# Patient Record
Sex: FEMALE | Race: WHITE | Employment: OTHER | URBAN - METROPOLITAN AREA
[De-identification: names, ages, dates, MRNs, and addresses within clinical notes are randomized per-mention and may not be internally consistent; named-entity substitution may affect disease eponyms.]

---

## 2017-02-07 ENCOUNTER — OFFICE VISIT (OUTPATIENT)
Dept: ORTHOPEDIC SURGERY | Age: 72
End: 2017-02-07

## 2017-02-07 VITALS
SYSTOLIC BLOOD PRESSURE: 132 MMHG | BODY MASS INDEX: 28.35 KG/M2 | DIASTOLIC BLOOD PRESSURE: 64 MMHG | WEIGHT: 160 LBS | HEIGHT: 63 IN | TEMPERATURE: 97.5 F | HEART RATE: 95 BPM

## 2017-02-07 DIAGNOSIS — M65.4 DE QUERVAIN'S TENOSYNOVITIS, LEFT: ICD-10-CM

## 2017-02-07 DIAGNOSIS — M19.012 PRIMARY OSTEOARTHRITIS OF BOTH SHOULDERS: Primary | ICD-10-CM

## 2017-02-07 DIAGNOSIS — M25.532 LEFT WRIST PAIN: ICD-10-CM

## 2017-02-07 DIAGNOSIS — M19.011 PRIMARY OSTEOARTHRITIS OF BOTH SHOULDERS: Primary | ICD-10-CM

## 2017-02-07 RX ORDER — DULOXETIN HYDROCHLORIDE 20 MG/1
90 CAPSULE, DELAYED RELEASE ORAL DAILY
COMMUNITY

## 2017-02-07 RX ORDER — TRIAMCINOLONE ACETONIDE 40 MG/ML
20 INJECTION, SUSPENSION INTRA-ARTICULAR; INTRAMUSCULAR ONCE
Qty: 1 ML | Refills: 0
Start: 2017-02-07 | End: 2017-02-07

## 2017-02-07 RX ORDER — TRIAMCINOLONE ACETONIDE 40 MG/ML
40 INJECTION, SUSPENSION INTRA-ARTICULAR; INTRAMUSCULAR ONCE
Qty: 1 ML | Refills: 0
Start: 2017-02-07 | End: 2017-02-07

## 2017-02-07 NOTE — PROGRESS NOTES
Christel Montalvo  1945   Chief Complaint   Patient presents with    Shoulder Pain     Bilateral, wants cortisone injections today    Wrist Pain     Left        HISTORY OF PRESENT ILLNESS  Christel Montalvo is a 66 y.o. female who presents today for reevaluation of bilateral shoulder and left wrist pain. She rates her pain 8/10 today. She states her wrist has been bothering her for several months. She states she has trouble with gripping and picking things up. The states the pain in both locations, affects her daily activities. Allergies   Allergen Reactions    Qvar [Beclomethasone Dipropionate] Shortness of Breath    Entex [Phenylephrine-Guaifenesin] Other (comments)     intolerance    Sulfa (Sulfonamide Antibiotics) Other (comments)        Past Medical History   Diagnosis Date    Allergic rhinitis      on allergy shots    Anemia     Asthma     Borderline diabetic     Cataract     Chronic lung disease     Cigarette smoker      abuse quit 2006    COPD [facility]ch[facility] pulmonary disease) ([facility])     Degenerative arthritis      both shoulders    Depression     Easy bruising     Hypertension     Nervousness     Osteoporosis 10/27/99      Social History     Social History    Marital status:      Spouse name: N/A    Number of children: N/A    Years of education: N/A     Occupational History    Not on file.      Social History Main Topics    Smoking status: Former Smoker     Packs/day: 2.50     Years: 45.00     Types: Cigarettes     Quit date: 1/1/2006    Smokeless tobacco: Never Used      Comment: smoked for 50 years    Alcohol use 1.2 - 1.8 oz/week     2 - 3 Standard drinks or equivalent per week      Comment: occ    Drug use: No    Sexual activity: Not Currently     Other Topics Concern    Not on file     Social History Narrative      Past Surgical History   Procedure Laterality Date    Hx heent  age 3     tonsillectomy    Hx orthopaedic  age 21     right leg tumor removed benign    Hx gyn  1974     hysterectomy    Hx gyn  1970     D & C      Family History   Problem Relation Age of Onset   [facility] Arthritis-rheumatoid Mother     Hypertension Mother     Headache Mother     Elevated Lipids Mother     Lung Disease Father     Cancer Maternal Aunt     Lung Disease Maternal Uncle       Current Outpatient Prescriptions   Medication Sig    DULoxetine (CYMBALTA) 20 mg capsule Take 20 mg by mouth daily.  triamcinolone acetonide (KENALOG) 40 mg/mL injection 1 mL by IntraMUSCular route once for 1 dose.  triamcinolone acetonide (KENALOG) 40 mg/mL injection 0.5 mL by IntraMUSCular route once for 1 dose.  SPIRIVA WITH HANDIHALER 18 mcg inhalation capsule     pravastatin (PRAVACHOL) 20 mg tablet     gabapentin (NEURONTIN) 100 mg capsule     PSEUDOEPHEDRINE-guaiFENesin (MUCINEX D)  mg per tablet Take 1 Tab by mouth every twelve (12) hours.  lisinopril (PRINIVIL, ZESTRIL) 10 mg tablet TAKE 1 TABLET BY MOUTH DAILY    ADVAIR DISKUS 250-50 mcg/dose diskus inhaler INHALE 1 PUFF BY MOUTH EVERY 12 HOURS** MUST MAKE APPOINTMENT FOR FURTHER REFILLS    albuterol (VENTOLIN HFA) 90 mcg/actuation inhaler Take 2 Puffs by inhalation every four (4) hours as needed for Wheezing.  diazepam (VALIUM) 5 mg tablet Take 1 Tab by mouth two (2) times daily as needed for Anxiety.  pseudoephedrine (SUDAFED) 30 mg tablet Take  by mouth every four (4) hours as needed.  aspirin (ASPIRIN) 325 mg tablet Take 325 mg by mouth daily.  cyclobenzaprine (FLEXERIL) 5 mg tablet Take 5 mg by mouth three (3) times daily as needed for Muscle Spasm(s).  HYDROcodone-acetaminophen (NORCO) 5-325 mg per tablet Take 2 Tabs by mouth every four (4) hours as needed for Pain. Max Daily Amount: 12 Tabs.  HYDROcodone-acetaminophen (NORCO) 5-325 mg per tablet Take 1 Tab by mouth every four (4) hours as needed for Pain. Max Daily Amount: 6 Tabs.     ondansetron hcl (ZOFRAN, AS HYDROCHLORIDE,) 4 mg tablet Take 1 Tab by mouth every eight (8) hours as needed for Nausea.  sertraline (ZOLOFT) 100 mg tablet TAKE 1 TABLET BY MOUTH EVERY DAY     No current facility-administered medications for this visit. REVIEW OF SYSTEM   Patient denies: Weight loss, Fever/Chills, HA, Visual changes, Fatigue, Chest pain, SOB, Abdominal pain, N/V/D/C, Blood in stool or urine, Edema. Pertinent positive as above in HPI. All others were negative    PHYSICAL EXAM:   Visit Vitals    /64    Pulse 95    Temp 97.5 °F (36.4 °C) (Oral)    Ht 5' 3\" (1.6 m)    Wt 160 lb (72.6 kg)    LMP 01/01/1974    BMI 28.34 kg/m2     The patient is a well-developed, well-nourished female   in no acute distress. The patient is alert and oriented times three. The patient is alert and oriented times three. Mood and affect are normal.  LYMPHATIC: lymph nodes are not enlarged and are within normal limits  SKIN: normal in color and non tender to palpation. There are no bruises or abrasions noted. NEUROLOGICAL: Motor sensory exam is within normal limits. Reflexes are equal bilaterally.  There is normal sensation to pinprick and light touch  MUSCULOSKELETAL:  Examination Left shoulder Right shoulder   Skin Intact Intact   AC joint tenderness - -   Biceps tenderness - -   Forward flexion/Elevation ROM     Active abduction  160   Glenohumeral abduction     External rotation ROM     Internal rotation ROM     Apprehension - -   Andrews Relocation - -   Jerk - -   Load and Shift - -   Obriens - -   Speeds - -   Impingement sign - -   Supraspinatus/Empty Can -, 5/5 -, 5/5   External Rotation Strength -, 5/5 -, 5/5   Lift Off/Belly Press -, 5/5 -, 5/5   Neurovascular Intact Intact       Examination Left Hand   Skin Intact   Deformity -   Swelling -   Tenderness -   Tenderness A1 Pulley -   Finger flexion Full   Finger extension Full   Thenar Eminence Atrophy -   Sensation Normal   Capillary refill -   Heberden's nodes -   Dupuytren's - Examination Left Wrist   Skin Intact   Tenderness -   Flexion 60   Extension 60   Deformity -   Effusion -   Tinnel's sign -   Phalen's test -   Finklestein maneuver -   Pain with thumb abduction -     PROCEDURE: After sterile prep, 0.5 cc of Xylocaine and 0.5 cc of Kenalog were injected into the left wrist. Her B/L shoulders were injected with 6 cc Xylocaine and 1 cc of Kenalog. [facility]  OFFICE PROCEDURE PROGRESS NOTE        Chart reviewed for the following:  Jewell Antony MD, have reviewed the History, Physical and updated the Allergic reactions for Ronnell Dalton performed immediately prior to start of procedure:  Jewell Antony MD, have performed the following reviews on Lynne Ledezma prior to the start of the procedure:            * Patient was identified by name and date of birth   * Agreement on procedure being performed was verified  * Risks and Benefits explained to the patient  * Procedure site verified and marked as necessary  * Patient was positioned for comfort  * Consent was signed and verified     Time: 11:25 AM    Date of procedure: 2/7/2017    Procedure performed by:  Jose Lynn MD    Provider assisted by: (see medication administration)    How tolerated by patient: tolerated the procedure well with no complications    Comments: none      IMAGING: XRs of the wrist dated 2/7/17 was reviewed and read: degenerative changes in the first [facility] joint. IMPRESSION:      ICD-10-CM ICD-9-CM    1. Primary osteoarthritis of both shoulders M19.011 715.11 DRAIN/INJECT LARGE JOINT/BURSA    M19.012  TRIAMCINOLONE ACETONIDE INJ      triamcinolone acetonide (KENALOG) 40 mg/mL injection   2.  Left wrist pain M25.532 719.43 AMB POC XRAY, WRIST; COMPLETE, 3+ VIE   3. De Quervain's tenosynovitis, left M65.4 727.04 TRIAMCINOLONE ACETONIDE INJ      triamcinolone acetonide (KENALOG) 40 mg/mL injection      INJECT TENDON SHEATH/LIGAMENT        PLAN: I feel her left wrist pain is coming from Exmovere. The pain in her shoulders is coming from glenohumeral OA. After discussing treatment options, the patient elected to undergo cortisone injections in both shoulders and her left wrist. I will see her back in 3 weeks if pain continues.   Follow-up Disposition: Not on File    Scribed by Joby Santos [address]) as dictated by MD Abbey Moore M.D.   [facility] and Spine Specialist

## 2017-02-14 ENCOUNTER — HOSPITAL ENCOUNTER (EMERGENCY)
Age: 72
Discharge: HOME | End: 2017-02-14
Attending: EMERGENCY MEDICINE | Admitting: EMERGENCY MEDICINE
Payer: MEDICARE

## 2017-02-14 ENCOUNTER — APPOINTMENT (OUTPATIENT)
Dept: GENERAL RADIOLOGY | Age: 72
End: 2017-02-14
Attending: EMERGENCY MEDICINE
Payer: MEDICARE

## 2017-02-14 VITALS
DIASTOLIC BLOOD PRESSURE: 65 MMHG | WEIGHT: 150 LBS | RESPIRATION RATE: 18 BRPM | HEART RATE: 86 BPM | OXYGEN SATURATION: 97 % | BODY MASS INDEX: 26.58 KG/M2 | TEMPERATURE: 97.9 F | SYSTOLIC BLOOD PRESSURE: 143 MMHG | HEIGHT: 63 IN

## 2017-02-14 DIAGNOSIS — M25.532 LEFT WRIST PAIN: Primary | ICD-10-CM

## 2017-02-14 PROCEDURE — L3908 WHO COCK-UP NONMOLDE PRE OTS: HCPCS

## 2017-02-14 PROCEDURE — 73110 X-RAY EXAM OF WRIST: CPT

## 2017-02-14 PROCEDURE — 99283 EMERGENCY DEPT VISIT LOW MDM: CPT

## 2017-02-14 NOTE — ED NOTES
Derik Terry is a 66 y.o. female that was discharged in stable. Pt was accompanied by daughter. Pt is not driving. The patients diagnosis, condition and treatment were explained to  patient and aftercare instructions were given. The patient verbalized understanding. Patient armband removed and shredded.

## 2017-02-14 NOTE — ED PROVIDER NOTES
HPI Comments: Pt is 77yo female with hx of asthma, arthritis, borderline DM, anemia, HTN, depression, COPD, cataracts complaining of left wrist pain x1-2 weeks. Pain comes and goes, not present all the time. No injury or trauma noted. Worse with certain movements. RT hand dominant. No radiation, pain stays in wrist.  No swelling, redness, fevers, chills, no other symptoms. Tried NSAIDS and ace wrap with minimal relief relief. Patient is a 66 y.o. female presenting with wrist pain. The history is provided by the patient. Wrist Pain    Pertinent negatives include no back pain and no neck pain. Past Medical History:   Diagnosis Date    Allergic rhinitis      on allergy shots    Anemia     Asthma     Borderline diabetic     Cataract     Chronic lung disease     Cigarette smoker      abuse quit 2006    COPD (chronic obstructive pulmonary disease) ([facility])     Degenerative arthritis      both shoulders    Depression     Easy bruising     Hypertension     Nervousness     Osteoporosis 10/27/99       Past Surgical History:   Procedure Laterality Date    Hx heent  age 3     tonsillectomy    Hx orthopaedic  age 21     right leg tumor removed benign    Hx gyn  1974     hysterectomy    Hx gyn  1970     D & C         Family History:   Problem Relation Age of Onset   [facility] Arthritis-rheumatoid Mother     Hypertension Mother     Headache Mother     Elevated Lipids Mother     Lung Disease Father     Cancer Maternal Aunt     Lung Disease Maternal Uncle        Social History     Social History    Marital status:      Spouse name: N/A    Number of children: N/A    Years of education: N/A     Occupational History    Not on file.      Social History Main Topics    Smoking status: Former Smoker     Packs/day: 2.50     Years: 45.00     Types: Cigarettes     Quit date: 1/1/2006    Smokeless tobacco: Never Used      Comment: smoked for 50 years    Alcohol use 1.2 - 1.8 oz/week     2 - 3 Standard drinks or equivalent per week      Comment: occ    Drug use: No    Sexual activity: Not Currently     Other Topics Concern    Not on file     Social History Narrative         ALLERGIES: Qvar [beclomethasone dipropionate]; Entex [phenylephrine-guaifenesin]; and Sulfa (sulfonamide antibiotics)    Review of Systems   Constitutional: Negative for chills and fever. HENT: Negative. Negative for congestion and facial swelling. Eyes: Negative for discharge and redness. Respiratory: Negative for cough and shortness of breath. Cardiovascular: Negative for chest pain. Gastrointestinal: Negative for abdominal pain, nausea and vomiting. Endocrine: Negative. Genitourinary: Negative. Musculoskeletal: Positive for arthralgias and joint swelling. Negative for back pain and neck pain. Skin: Negative for rash and wound. Allergic/Immunologic: Negative. Neurological: Negative for dizziness, light-headedness and headaches. Hematological: Negative. Psychiatric/Behavioral: Negative. Vitals:    02/14/17 1255   BP: 143/65   Pulse: 86   Resp: 18   Temp: 97.9 °F (36.6 °C)   SpO2: 97%   Weight: 68 kg (150 lb)   Height: 5' 3\" (1.6 m)            Physical Exam   Constitutional: She is oriented to person, place, and time. She appears well-developed and well-nourished. No distress. HENT:   Head: Normocephalic and atraumatic. Mouth/Throat: Oropharynx is clear and moist.   Eyes: Conjunctivae are normal.   Neck: Normal range of motion. Neck supple. Cardiovascular: Normal rate and regular rhythm. Pulses:       Radial pulses are 2+ on the right side   Pulmonary/Chest: Effort normal. No respiratory distress. Musculoskeletal: Normal range of motion. Left wrist nontender to palpation, no swelling, erythema, deformtiy noted. FROM, no snuffbox tenderness. No pain to LT forearm or fingers or hand.   Normal sesnation, normal cap refill    Neurological: She is alert and oriented to person, place, and time. No cranial nerve deficit. Coordination normal.   Skin: Skin is warm and dry. No rash noted. She is not diaphoretic. Psychiatric: She has a normal mood and affect. Nursing note and vitals reviewed. MDM  Number of Diagnoses or Management Options  Diagnosis management comments: Xray IMPRESSION:  1. No evidence of acute fracture or dislocation. Based upon the patients presentation with noted HPI and PE, along with the work up done in the emergency department today, I believe the patient's symptoms are a result of arthritis. Pt instructed to use NSAIDS and f/u with ortho if pain persists. Will give velcro wrist splint. No indication for further ED w/u or treatment today. Castillo Ken PA-C 1:40 PM          Amount and/or Complexity of Data Reviewed  Tests in the radiology section of CPT®: ordered and reviewed      ED Course       Procedures    Diagnosis:   1. Left wrist pain          Disposition: discharge home    Follow-up Information     Follow up With Details Comments 110 JUMA Coburn MD In 1 week  [address] [address]  [phone]      [facility] EMERGENCY DEPT   [address] [place] [address] [address] [facility] MD Ozzy In 2 weeks  [address] [facility]  [address]  [facility] and Spine Specialist [address]  [phone]            Patient's Medications   Start Taking    No medications on file   Continue Taking    ALBUTEROL (VENTOLIN HFA) 90 MCG/ACTUATION INHALER    Take 2 Puffs by inhalation every four (4) hours as needed for Wheezing. CYCLOBENZAPRINE (FLEXERIL) 5 MG TABLET    Take 5 mg by mouth three (3) times daily as needed for Muscle Spasm(s). DIAZEPAM (VALIUM) 5 MG TABLET    Take 1 Tab by mouth two (2) times daily as needed for Anxiety. DULOXETINE (CYMBALTA) 20 MG CAPSULE    Take 20 mg by mouth daily.     GABAPENTIN (NEURONTIN) 100 MG CAPSULE        LISINOPRIL (PRINIVIL, ZESTRIL) 10 MG TABLET    TAKE 1 TABLET BY MOUTH DAILY    PRAVASTATIN (PRAVACHOL) 20 MG TABLET        SERTRALINE (ZOLOFT) 100 MG TABLET    TAKE 1 TABLET BY MOUTH EVERY DAY   These Medications have changed    No medications on file   Stop Taking    No medications on file

## 2017-02-14 NOTE — DISCHARGE INSTRUCTIONS
Joint Pain: Care Instructions  Your Care Instructions  Many people have small aches and pains from overuse or injury to muscles and joints. Joint injuries often happen during sports or recreation, work tasks, or projects around the home. An overuse injury can happen when you put too much stress on a joint or when you do an activity that stresses the joint over and over, such as using the computer or rowing a boat. You can take action at home to help your muscles and joints get better. You should feel better in 1 to 2 weeks, but it can take 3 months or more to heal completely. Follow-up care is a key part of your treatment and safety. Be sure to make and go to all appointments, and call your doctor if you are having problems. It's also a good idea to know your test results and keep a list of the medicines you take. How can you care for yourself at home? · Do not put weight on the injured joint for at least a day or two. · For the first day or two after an injury, do not take hot showers or baths, and do not use hot packs. The heat could make swelling worse. · Put ice or a cold pack on the sore joint for 10 to 20 minutes at a time. Try to do this every 1 to 2 hours for the next 3 days (when you are awake) or until the swelling goes down. Put a thin cloth between the ice and your skin. · Wrap the injury in an elastic bandage. Do not wrap it too tightly because this can cause more swelling. · Prop up the sore joint on a pillow when you ice it or anytime you sit or lie down during the next 3 days. Try to keep it above the level of your heart. This will help reduce swelling. · Take an over-the-counter pain medicine, such as acetaminophen (Tylenol), ibuprofen (Advil, Motrin), or naproxen (Aleve). Read and follow all instructions on the label. · After 1 or 2 days of rest, begin moving the joint gently.  While the joint is still healing, you can begin to exercise using activities that do not strain or hurt the painful joint. When should you call for help? Call your doctor now or seek immediate medical care if:  · You have signs of infection, such as:  ¨ Increased pain, swelling, warmth, and redness. ¨ Red streaks leading from the joint. ¨ A fever. Watch closely for changes in your health, and be sure to contact your doctor if:  · Your movement or symptoms are not getting better after 1 to 2 weeks of home treatment. Where can you learn more? Go to http://leticia-vitaliy.info/. Enter P205 in the search box to learn more about \"Joint Pain: Care Instructions. \"  Current as of: May 23, 2016  Content Version: 11.1  © 8865-8829 [employer]. Care instructions adapted under license by [employer] (which disclaims liability or warranty for this information). If you have questions about a medical condition or this instruction, always ask your healthcare professional. [place] [address] any warranty or liability for your use of this information. Musculoskeletal Pain: Care Instructions  Your Care Instructions  Different problems with the bones, muscles, nerves, ligaments, and tendons in the body can cause pain. One or more areas of your body may ache or burn. Or they may feel tired, stiff, or sore. The medical term for this type of pain is musculoskeletal pain. It can have many different causes. Sometimes the pain is caused by an injury such as a strain or sprain. Or you might have pain from using one part of your body in the same way over and over again. This is called overuse. In some cases, the cause of the pain is another health problem such as arthritis or fibromyalgia. The doctor will examine you and ask you questions about your health to help find the cause of your pain. Blood tests or imaging tests like an X-ray may also be helpful. But sometimes doctors can't find a cause of the pain.   Treatment depends on your symptoms and the cause of the pain, if known.  The doctor has checked you carefully, but problems can develop later. If you notice any problems or new symptoms, get medical treatment right away. Follow-up care is a key part of your treatment and safety. Be sure to make and go to all appointments, and call your doctor if you are having problems. It's also a good idea to know your test results and keep a list of the medicines you take. How can you care for yourself at home? · Rest until you feel better. · Do not do anything that makes the pain worse. Return to exercise gradually if you feel better and your doctor says it's okay. · Be safe with medicines. Read and follow all instructions on the label. ¨ If the doctor gave you a prescription medicine for pain, take it as prescribed. ¨ If you are not taking a prescription pain medicine, ask your doctor if you can take an over-the-counter medicine. · Put ice or a cold pack on the area for 10 to 20 minutes at a time to ease pain. Put a thin cloth between the ice and your skin. When should you call for help? Call your doctor now or seek immediate medical care if:  · You have new pain, or your pain gets worse. · You have new symptoms such as a fever, a rash, or chills. Watch closely for changes in your health, and be sure to contact your doctor if:  · You do not get better as expected. Where can you learn more? Go to http://leticia-vitaliy.info/. Enter Y283 in the search box to learn more about \"Musculoskeletal Pain: Care Instructions. \"  Current as of: February 19, 2016  Content Version: 11.1  © 2855-4319 [employer]. Care instructions adapted under license by [employer] (which disclaims liability or warranty for this information). If you have questions about a medical condition or this instruction, always ask your healthcare professional. [address] any warranty or liability for your use of this information.

## 2017-03-02 ENCOUNTER — TELEPHONE (OUTPATIENT)
Dept: PULMONOLOGY | Age: 72
End: 2017-03-02

## 2017-03-02 NOTE — TELEPHONE ENCOUNTER
Pulmonary Rehab called patient and spoke to her daughter. They are interested so we scheduled an evaluation for 3/7/17 at 12:30.        Tristan Gerber

## 2017-03-07 ENCOUNTER — HOSPITAL ENCOUNTER (OUTPATIENT)
Dept: PULMONOLOGY | Age: 72
Discharge: HOME | End: 2017-03-07
Payer: MEDICARE

## 2017-03-07 PROCEDURE — G0424 PULMONARY REHAB W EXER: HCPCS

## 2017-03-14 ENCOUNTER — HOSPITAL ENCOUNTER (OUTPATIENT)
Dept: PULMONOLOGY | Age: 72
Discharge: HOME | End: 2017-03-14
Payer: MEDICARE

## 2017-03-14 PROCEDURE — G0424 PULMONARY REHAB W EXER: HCPCS

## 2017-03-14 NOTE — PROGRESS NOTES
[facility]  Outpatient Pulmonary Rehabilitation Flowsheet  [address] [facility]     Aubree Ames  1945       Patient's carry-over of treatment delivered by: First treatment day    Objective Daily Findings    Comments:    Respiratory Muscle Functioning/Exercise Conditioning/Strengthening Session:    Time In: 6563  Time Out::0130  Session Number: 2  O2 with Theapy: 0 L/min    Pre SPO2 93  Pre HR:97  Pre BP: 125/70    RR: 15  Wt: not taken by pt  Temp: not taken by pt   Post SPO2: 93 Post HR: 85  Post BP: 125/71    Self Care Home Management Instruction/Education    Self Care Home Management Instruction Education: Breathing Retaining, Collaborative Self Managment and COPD & Lung Disease. Patient's Response to Education: Patient actively participated in education and Able to demonstrate. Comments: Individual Therapy               Goal     Actual                      During Therapy                 Post-Therapy     % SpO2 HR RPD 1 - 4 RPE 6-20 Pain  1 - 10 % SpO2 HR RPD 1 - 4 RPE 6-20 Pain 0 - 10   Stretching/DB/  Monitoring 10 min 10 min 93 97 1 6 1 93 93 1 6 1   IS 1500 ml  20 min 6025-1877  20 min 94 92 1 11 1 94 93 1 6 1   IMT               Arm Bike 15 martin  20 min 5 martin  15 min 96 89 1 11 4 95 85 1 6 2   Weighted DB 5 lbs  20 min 2 lbs  15 min 92 86 1 11 1 93 85 1 6 1     Patient's Response to Therapy: Increase fatique, Increase pain: Location: left shoulder, Good effort and Good motivation.   Comments:    Time In: 0130     Session Number:3  Time Out: 0205     O2 with therapy: 0 L/min         Individual Therapy               Goal     Actual                      During Therapy           Post Therapy     % SpO2 HR RPD 1 - 4 RPE 6-20 Pain  1 - 10 % SpO2 HR RPD 1 - 4 RPE 6-20 Pain 0 - 10   Stepper L5  30 min L2  20 min 92 94 1 11 1 96 97 1 6 1   Treadmill               Leg Bike               Stretching/DB/  Monitoring 15 min 15 min 96 97 1 6 1 93 85 1 6 1 Patient's response to therapy: Increase fatique, Good effort and Good motivation  Comment:s      Assessment    Patient's response: Patient progressing towardsd LTGs evident by: Increased PLB.     Plan: Continue as written    Code     Billin units      Physician supervision provided this date of service by: Dr Cheryl Rendon, Hospitalist     Therapist Signature: RT Sindhu    Therapist PRINTED Name and Credential: RT Sindhu    3/14/2017  4:07 PM

## 2017-03-16 ENCOUNTER — HOSPITAL ENCOUNTER (OUTPATIENT)
Dept: PULMONOLOGY | Age: 72
Discharge: HOME | End: 2017-03-16
Payer: MEDICARE

## 2017-03-16 PROCEDURE — G0424 PULMONARY REHAB W EXER: HCPCS

## 2017-03-16 NOTE — PROGRESS NOTES
[facility]  Outpatient Pulmonary Rehabilitation Flowsheet  [address] [facility]     Jennie Shaw  1945       Patient's carry-over of treatment delivered by: Pt reports she has less pain in her left shoulder. Objective Daily Findings    Comments:    Respiratory Muscle Functioning/Exercise Conditioning/Strengthening Session:    Time In: 1230  Time Out::0130  Session Number: 4  O2 with Theapy: 0 L/min    Pre SPO2 95  Pre HR:89  Pre BP: 132/65    RR: 16  Wt: 160  Temp: 97.7   Post SPO2: 95 Post HR: 76  Post BP: 111/60    Self Care Home Management Instruction/Education    Self Care Home Management Instruction Education: Breathing Retaining and COPD & Lung Disease. Patient's Response to Education: Patient actively participated in education and Able to demonstrate. Comments: Individual Therapy               Goal     Actual                      During Therapy                 Post-Therapy     % SpO2 HR RPD 1 - 4 RPE 6-20 Pain  1 - 10 % SpO2 HR RPD 1 - 4 RPE 6-20 Pain 0 - 10   Stretching/DB/  Monitoring 10 min 10 min 95 89 1 7 1 94 88 1 6 1   IS 2000 ml  20 min 0205-3416  15 min 95 90 1 10 1 94 88 1 10 1   IMT 12 cm  20 min 10 cm  10 min 95 82 1 8 1 95 76 1 6 1   Arm Bike 15 martin  20 min 5 martin  25 min 96 88 1 11 1 92 88 1 6 1   Weighted DB                 Patient's Response to Therapy: Increase fatique, Good effort and Good motivation.   Comments:    Time In: 0130     Session Number:5  Time Out: 0205     O2 with therapy: 0 L/min         Individual Therapy               Goal     Actual                      During Therapy           Post Therapy     % SpO2 HR RPD 1 - 4 RPE 6-20 Pain  1 - 10 % SpO2 HR RPD 1 - 4 RPE 6-20 Pain 0 - 10   Stepper               Treadmill               Leg Bike L3  30 min L1  30 min 93 94 1 12 2 94 93 1 6 2   Stretching/DB/  Monitoring 5 min 5 min 94 93 1 7 2 94 86 1 6 2                       Patient's response to therapy: Increase fatique, Good effort and Good motivation  Comment:s      Assessment    Patient's response: Patient progressing towardsd LTGs evident by: Increased PLB and Decreased pain.     Plan: Continue as written    Code     Billin units      Physician supervision provided this date of service by: Dr Miya Yo, Hospitalist     Therapist Signature: RT Wanda    Therapist PRINTED Name and Credential: RT Wanda    3/16/2017  12:55 PM

## 2017-03-21 ENCOUNTER — HOSPITAL ENCOUNTER (OUTPATIENT)
Dept: PULMONOLOGY | Age: 72
Discharge: HOME | End: 2017-03-21
Payer: MEDICARE

## 2017-03-21 PROCEDURE — G0424 PULMONARY REHAB W EXER: HCPCS

## 2017-03-21 NOTE — PROGRESS NOTES
[facility]  Outpatient Pulmonary Rehabilitation Flowsheet  [address] [facility]     Michail Scales  1945       Patient's carry-over of treatment delivered by: Pt reports using and practicing PLB and DB at home. Objective Daily Findings    Comments:    Respiratory Muscle Functioning/Exercise Conditioning/Strengthening Session:    Time In: 2191  Time KHRIS::3941  Session Number: 6  O2 with Theapy: 0 L/min    Pre SPO2 96  Pre HR:93  Pre BP: 129/71    RR: 16  Wt: 160  Temp: 97.8   Post SPO2: 98 Post HR: 87  Post BP: not recorded    Self Care Home Management Instruction/Education    Self Care Home Management Instruction Education: Breathing Retaining and COPD & Lung Disease. Patient's Response to Education: Patient actively participated in education and Able to demonstrate. Comments: Individual Therapy               Goal     Actual                      During Therapy                 Post-Therapy     % SpO2 HR RPD 1 - 4 RPE 6-20 Pain  1 - 10 % SpO2 HR RPD 1 - 4 RPE 6-20 Pain 0 - 10   Stretching/DB/  Monitoring 10 min 10 min 96 93 1 6 1 97 88 1 6 1   IS 2000 ml  25 min 0698-1913  25 min 97 87 1 12 1 98 86 1 6 1   IMT               Arm Bike               Weighted DB 5 lbs  25 min 2 lbs  25 min 96 88 1 11 1 98 87 1 6 1     Patient's Response to Therapy: Increase fatique, Good effort and Good motivation.   Comments:    Time In: 0145     Session Number:7  Time Out: 0220     O2 with therapy: 0 L/min         Individual Therapy               Goal     Actual                      During Therapy           Post Therapy     % SpO2 HR RPD 1 - 4 RPE 6-20 Pain  1 - 10 % SpO2 HR RPD 1 - 4 RPE 6-20 Pain 0 - 10   Stepper L5  30 min L3-15 min  L2-15 min 94 94 1 11 1 94 98 1 6 1   Treadmill               Leg Bike               Stretching/DB/  Monitoring 5 min 5 min 97 88 1 6 1 98 87 1 6 1                       Patient's response to therapy: Increase fatique, Good effort and Good motivation  Comment:s      Assessment    Patient's response: Patient progressing towardsd LTGs evident by: Increased PLB, Increased DB and Improved Aerobic capicity and endurance.     Plan: Continue as written    Code     Billin units      Physician supervision provided this date of service by: Dr Sukhwinder Soares, [facility]     Therapist Signature: RT Willy    Therapist PRINTED Name and Credential: RT Willy    3/21/2017  12:52 PM

## 2017-03-23 ENCOUNTER — HOSPITAL ENCOUNTER (OUTPATIENT)
Dept: PULMONOLOGY | Age: 72
Discharge: HOME | End: 2017-03-23
Payer: MEDICARE

## 2017-03-23 PROCEDURE — G0424 PULMONARY REHAB W EXER: HCPCS

## 2017-03-23 NOTE — PROGRESS NOTES
[facility]  Outpatient Pulmonary Rehabilitation Flowsheet  [address] [facility]     Buren Libman  1945       Patient's carry-over of treatment delivered by: PLB while exercising with less cueing. Objective Daily Findings    Comments:    Respiratory Muscle Functioning/Exercise Conditioning/Strengthening Session:    Time In: 1230  Time Out::0130  Session Number: 8  O2 with Theapy: 0 L/min    Pre SPO2 98  Pre HR:92  Pre BP: 128/70    RR: 15  Wt: not taken by pt  Temp: not taken by pt   Post SPO2: 96 Post HR: 89  Post BP: 111/71    Self Care Home Management Instruction/Education    Self Care Home Management Instruction Education: Breathing Retaining, Collaborative Self Managment and COPD & Lung Disease. Patient's Response to Education: Patient actively participated in education and Able to demonstrate. Comments: Individual Therapy               Goal     Actual                      During Therapy                 Post-Therapy     % SpO2 HR RPD 1 - 4 RPE 6-20 Pain  1 - 10 % SpO2 HR RPD 1 - 4 RPE 6-20 Pain 0 - 10   Stretching/DB/  Monitoring 10 min 10 min 98 92 1 6 1 97 95 1 6 1   IS 1750 ml  20 min 1750 ml  15 min 97 95 1 10 1 97 92 1 6 1   IMT 13 cm  20 min 11 cm  15 min 95 91 1 10 1 96 89 1 6 1   Treadmill 1.2 mph  30 min 1.0 mph  20 min 94 104 1 12 1 99 100 1 6 1   Weighted DB                 Patient's Response to Therapy: Increase fatique, Good effort and Good motivation.   Comments:    Time In: 0130     Session Number:9  Time Out: 0205     O2 with therapy: 0 L/min         Individual Therapy               Goal     Actual                      During Therapy           Post Therapy     % SpO2 HR RPD 1 - 4 RPE 6-20 Pain  1 - 10 % SpO2 HR RPD 1 - 4 RPE 6-20 Pain 0 - 10   Stepper               Treadmill               Leg Bike L3  30 min L2-5 min  L1-25 min 97 108 1 13 1 96 100 1 6 1   Stretching/DB/  Monitoring 5 min 5 min 96 100 1 6 1 96 89 1 6 1 Patient's response to therapy: Increase fatique, Good effort and Good motivation  Comment:s      Assessment    Patient's response: Patient progressing towardsd LTGs evident by: Increased PLB and Improved Aerobic capicity and endurance.     Plan: Continue as written    Code     Billin units      Physician supervision provided this date of service by: Dr Jessica Jennings, Hospitalist     Therapist Signature: RT Wilda    Therapist PRINTED Name and Credential: RT Wilda    3/23/2017  2:26 PM

## 2017-03-28 ENCOUNTER — HOSPITAL ENCOUNTER (OUTPATIENT)
Dept: PULMONOLOGY | Age: 72
Discharge: HOME | End: 2017-03-28
Payer: MEDICARE

## 2017-03-28 PROCEDURE — G0424 PULMONARY REHAB W EXER: HCPCS

## 2017-03-28 NOTE — PROGRESS NOTES
[facility]  Outpatient Pulmonary Rehabilitation Flowsheet  [address] [facility]     Delefernando Cotto  1945       Patient's carry-over of treatment delivered by: Pt reports she practices PLB and DB at home. Objective Daily Findings    Comments:Pt unable to perform arm bike due to shoulder pain. Respiratory Muscle Functioning/Exercise Conditioning/Strengthening Session:    Time In: 1230  Time Out::0130  Session Number: 10  O2 with Theapy: 0 L/min    Pre SPO2 96  Pre HR:95  Pre BP: 124/70    RR: 15  Wt: not taken by pt  Temp: not taken by pt   Post SPO2: 95 Post HR: 93  Post BP: 129/79    Self Care Home Management Instruction/Education    Self Care Home Management Instruction Education: Relaxation Techniques and Stress Management. Patient's Response to Education: Patient actively participated in education. Comments: Individual Therapy               Goal     Actual                      During Therapy                 Post-Therapy     % SpO2 HR RPD 1 - 4 RPE 6-20 Pain  1 - 10 % SpO2 HR RPD 1 - 4 RPE 6-20 Pain 0 - 10   Stretching/DB/  Monitoring 15 min 15 min 96 95 1 6 1 95 96 1 6 1   IS 1750 ml  20 min 1750 ml  15 min 96 97 1 12 1 95 95 1 6 1   IMT               Timed Ambulation 15 min 15 min 91 103 2 12 4 96 94 1 8 2   Weighted DB 5 lbs  20 min 2 lbs  15 min 95 95 1 11 1 95 93 1 6 1     Patient's Response to Therapy: Increase dyspnea, Increase fatique, Increase pain: Location: lower back, Good effort and Good motivation.   Comments:    Time In: 0130     Session Number:11  Time Out: 0210     O2 with therapy: 0 L/min         Individual Therapy               Goal     Actual                      During Therapy           Post Therapy     % SpO2 HR RPD 1 - 4 RPE 6-20 Pain  1 - 10 % SpO2 HR RPD 1 - 4 RPE 6-20 Pain 0 - 10   Stepper L5  30 min L4-25 min  L3-5 min 95 94 1 10 1 94 93 1 6 1   Treadmill               Leg Bike               Stretching/DB/  Monitoring 10 min 10 min 94 96 1 6 1 95 96 1 6 1                       Patient's response to therapy: Increase fatique, Good effort and Good motivation  Comment:s      Assessment    Patient's response: Patient progressing towardsd LTGs evident by: Increased PLB, Increased DB and Improved Aerobic capicity and endurance.     Plan: Continue as written    Code     Billin units      Physician supervision provided this date of service by: Dr Tiffanie Fry     Therapist Signature: RT Colt    Therapist PRINTED Name and Credential: RT Colt    3/28/2017  12:47 PM

## 2017-03-30 ENCOUNTER — HOSPITAL ENCOUNTER (OUTPATIENT)
Dept: PULMONOLOGY | Age: 72
End: 2017-03-30
Payer: MEDICARE

## 2017-04-04 ENCOUNTER — HOSPITAL ENCOUNTER (OUTPATIENT)
Dept: PULMONOLOGY | Age: 72
Discharge: HOME | End: 2017-04-04
Payer: MEDICARE

## 2017-04-04 PROCEDURE — G0424 PULMONARY REHAB W EXER: HCPCS

## 2017-04-04 NOTE — PROGRESS NOTES
[facility]  Outpatient Pulmonary Rehabilitation Flowsheet  [address] [facility]     Deleta Kirti  1945       Patient's carry-over of treatment delivered by: PLB while exercising with moderate cueing. Objective Daily Findings    Comments:    Respiratory Muscle Functioning/Exercise Conditioning/Strengthening Session:    Time In: 1240  Time Out::1340  Session Number: 12  O2 with Theapy: 0 L/min    Pre SPO2 94  Pre HR:83  Pre BP: 112/62    RR: 16  Wt: not taken by pt  Temp: not taken by pt   Post SPO2: 96 Post HR: 88  Post BP: 115/66    Self Care Home Management Instruction/Education    Self Care Home Management Instruction Education: Breathing Retaining. Patient's Response to Education: Patient actively participated in education and Able to demonstrate. Comments: Individual Therapy               Goal     Actual                      During Therapy                 Post-Therapy     % SpO2 HR RPD 1 - 4 RPE 6-20 Pain  1 - 10 % SpO2 HR RPD 1 - 4 RPE 6-20 Pain 0 - 10   Stretching/DB/  Monitoring 10 min 10 min 94 83 1 6 1 95 88 1 6 1   IS 1750 ml  20 min 1750 ml  20 min 95 85 1 10 1 94 83 1 6 1   IMT               Bike L3  30 min L2-20min  L1-10 min 97 105 2 13 1 97 102 1 9 1   Weighted DB                 Patient's Response to Therapy: Increase dyspnea, Increase fatique, Increase heart rate, Good effort and Good motivation.   Comments:    Time In: 0140     Session Number:13  Time Out: 0215     O2 with therapy: 0 L/min         Individual Therapy               Goal     Actual                      During Therapy           Post Therapy     % SpO2 HR RPD 1 - 4 RPE 6-20 Pain  1 - 10 % SpO2 HR RPD 1 - 4 RPE 6-20 Pain 0 - 10   Stepper L5  30 min L5-10 min  L4-20 min 91 94 1 8 1 94 93 1 6 1   Treadmill               Leg Bike               Stretching/DB/  Monitoring 5 min 5 min 94 93 1 6 1 96 88 1 6 1   Rest/PLB                    Patient's response to therapy: Increase fatique, Good effort and Good motivation  Comment:s      Assessment    Patient's response: Patient progressing towardsd LTGs evident by: Increased PLB and Improved Aerobic capicity and endurance.     Plan: Continue as written    Code     Billin units      Physician supervision provided this date of service by: Dr Tomi Sanderson     Therapist Signature: RT Meg    Therapist PRINTED Name and Credential: RT Meg    2017  1:05 PM

## 2017-04-06 ENCOUNTER — APPOINTMENT (OUTPATIENT)
Dept: PULMONOLOGY | Age: 72
End: 2017-04-06
Payer: MEDICARE

## 2017-04-13 ENCOUNTER — HOSPITAL ENCOUNTER (OUTPATIENT)
Dept: PULMONOLOGY | Age: 72
Discharge: HOME | End: 2017-04-13
Payer: MEDICARE

## 2017-04-13 PROCEDURE — G0424 PULMONARY REHAB W EXER: HCPCS

## 2017-04-13 NOTE — PROGRESS NOTES
[facility]  Outpatient Pulmonary Rehabilitation Flowsheet  [address] [facility]     Christ Dad  1945       Patient's carry-over of treatment delivered by: PLB while exercising with moderate cues. Objective Daily Findings    Comments:    Respiratory Muscle Functioning/Exercise Conditioning/Strengthening Session:    Time In: 1230  Time Out::0130  Session Number: 14  O2 with Theapy: 0 L/min    Pre SPO2 93  Pre HR:104  Pre BP: 133/73    RR: 16 Wt: 159  Temp: not taken by pt   Post SPO2: 95 Post HR: 89  Post BP: 97/60    Self Care Home Management Instruction/Education    Self Care Home Management Instruction Education: Breathing Retaining, Collaborative Self Managment and COPD & Lung Disease. Patient's Response to Education: Patient actively participated in education and Able to demonstrate. Comments: Individual Therapy               Goal     Actual                      During Therapy                 Post-Therapy     % SpO2 HR RPD 1 - 4 RPE 6-20 Pain  1 - 10 % SpO2 HR RPD 1 - 4 RPE 6-20 Pain 0 - 10   Stretching/DB/  Monitoring 10 min 10 min 94 92 1 6 1 93 90 1 6 1   IS 2000 ml  20 min 2000 ml  10 min 95 98 1 10 1 94 95 1 6 1   IMT 13 cm  20 min 12 cm  10 min 94 92 1 10 1 95 89 1 6 1   Stepper L5  30 min L5-20 min  L4-10 min 91 111 1 11 1 92 100 1 6 1   Weighted DB                 Patient's Response to Therapy: Increase fatique, Increase heart rate, Good effort and Good motivation.   Comments:    Time In: 0130     Session Number:15  Time Out: 0205     O2 with therapy: 0 L/min         Individual Therapy               Goal     Actual                      During Therapy           Post Therapy     % SpO2 HR RPD 1 - 4 RPE 6-20 Pain  1 - 10 % SpO2 HR RPD 1 - 4 RPE 6-20 Pain 0 - 10   Stepper               Treadmill 1.2 mph  30 min 1.0 mph  30 min 92 114 1 11 1 97 109 1 6 1   Leg Bike               Stretching/DB/  Monitoring 5 min 5 min 94 111 1 9 1 97 109 1 6 1 Patient's response to therapy: Increase fatique, Increase heart rate, Good effort and Good motivation  Comment:s      Assessment    Patient's response: Patient progressing towardsd LTGs evident by: Increased PLB and Improved Aerobic capicity and endurance.     Plan: Continue as written    Code     Billin units      Physician supervision provided this date of service by: Dr Melody Ramos     Therapist Signature: RT Ricardo    Therapist PRINTED Name and Credential: RT Ricardo    2017  12:53 PM

## 2017-04-18 ENCOUNTER — APPOINTMENT (OUTPATIENT)
Dept: PULMONOLOGY | Age: 72
End: 2017-04-18
Payer: MEDICARE

## 2017-04-20 ENCOUNTER — HOSPITAL ENCOUNTER (OUTPATIENT)
Dept: PULMONOLOGY | Age: 72
Discharge: HOME | End: 2017-04-20
Payer: MEDICARE

## 2017-04-20 PROCEDURE — G0424 PULMONARY REHAB W EXER: HCPCS

## 2017-04-20 NOTE — PROGRESS NOTES
[facility]  Outpatient Pulmonary Rehabilitation Flowsheet  [address] [place] [address] [address] [facility]     [facility]  1945       Patient's carry-over of treatment delivered by: PLB while exercising with moderate cues. Objective Daily Findings    Comments:    Respiratory Muscle Functioning/Exercise Conditioning/Strengthening Session:    Time In: 1230  Time Out::0130  Session Number: 16  O2 with Theapy: 0 L/min    Pre SPO2 94  Pre HR:94  Pre BP: 105/65    RR: 15  Wt: 159  Temp: 97.36   Post SPO2: 96 Post HR: 96  Post BP: 114/74    Self Care Home Management Instruction/Education    Self Care Home Management Instruction Education: Breathing Retaining and COPD & Lung Disease. Patient's Response to Education: Patient actively participated in education and Able to demonstrate. Comments: Individual Therapy               Goal     Actual                      During Therapy                 Post-Therapy     % SpO2 HR RPD 1 - 4 RPE 6-20 Pain  1 - 10 % SpO2 HR RPD 1 - 4 RPE 6-20 Pain 0 - 10   Stretching/DB/  Monitoring 10 min 10 min 96 98 1 6 1 96 96 1 6 1   IS 2000 ml  20 min 2000 ml  20 min 94 96 1 10 1 95 96 1 6 1   IMT               Stepper L5  30 min L5-20 min  L4-10 min 93 97 1 11 1 96 98 1 6 1   Weighted DB                 Patient's Response to Therapy: Increase fatique, Good effort and Good motivation.   Comments:    Time In: 0130     Session Number:17  Time Out: 0205     O2 with therapy: 0 L/min         Individual Therapy               Goal     Actual                      During Therapy           Post Therapy     % SpO2 HR RPD 1 - 4 RPE 6-20 Pain  1 - 10 % SpO2 HR RPD 1 - 4 RPE 6-20 Pain 0 - 10   Stepper               Treadmill               Leg Bike L3  30 min L3-5 min  L2-25 min 93 115 2 15 1 97 110 2 13 1   Stretching/DB/  Monitoring 5 min 5 min 97 110 2 13 1 97 99 1 6 1                       Patient's response to therapy: Increase dyspnea, Increase fatique, Increase heart rate, Good effort and Good motivation  Comment:s      Assessment    Patient's response: Patient progressing towardsd LTGs evident by: Increased PLB and Improved Aerobic capicity and endurance.     Plan: Continue as written    Code     Billin units      Physician supervision provided this date of service by: Dr Rocio Padron     Therapist Signature: RT Josué    Therapist PRINTED Name and Credential: RT Josué    2017  7:54 AM

## 2017-04-25 ENCOUNTER — HOSPITAL ENCOUNTER (OUTPATIENT)
Dept: PULMONOLOGY | Age: 72
Discharge: HOME | End: 2017-04-25
Payer: MEDICARE

## 2017-04-25 PROCEDURE — G0424 PULMONARY REHAB W EXER: HCPCS

## 2017-04-25 NOTE — PROGRESS NOTES
[facility]  Outpatient Pulmonary Rehabilitation Flowsheet  [address] [facility]     Isra Cardenas  1945       Patient's carry-over of treatment delivered by: PLB while exercising with some cueing. Objective Daily Findings    Comments:    Respiratory Muscle Functioning/Exercise Conditioning/Strengthening Session:    Time In: 1230  Time Out::0130  Session Number: 18  O2 with Theapy: 0 L/min    Pre SPO2 94  Pre HR:92  Pre BP: 134/67    RR: 15  Wt: not taken by pt  Temp: not taken by pt   Post SPO2: 94 Post HR: 87  Post BP: 130/75    Self Care Home Management Instruction/Education    Self Care Home Management Instruction Education: Spacer Use and PEP valve. Patient's Response to Education: Patient actively participated in education. Comments: Individual Therapy               Goal     Actual                      During Therapy                 Post-Therapy     % SpO2 HR RPD 1 - 4 RPE 6-20 Pain  1 - 10 % SpO2 HR RPD 1 - 4 RPE 6-20 Pain 0 - 10   Stretching/DB/  Monitoring 10 min 10 min 96 99 1 10 1 95 91 1 6 1   IS               IMT/PEP 13 cm  15 min 12 cm  15 min 94 92 1 8 1 95 89 1 6 1   Arm Bike               Treadmill 1.2 mph  30 min 1.1-30 min  1.0-5 min 95 106 1 12 2 96 99 1 10 1     Patient's Response to Therapy: Increase fatique, Increase pain: Location: right calf, Good effort and Good motivation.   Comments:    Time In: 0130     Session Number:19  Time Out: 0210     O2 with therapy: 0 L/min         Individual Therapy               Goal     Actual                      During Therapy           Post Therapy     % SpO2 HR RPD 1 - 4 RPE 6-20 Pain  1 - 10 % SpO2 HR RPD 1 - 4 RPE 6-20 Pain 0 - 10   Stepper L5  30 min L5-25 min  L4-5 min 93 92 1 11 1 94 87 1 6 1   Treadmill               Leg Bike               Stretching/DB/  Monitoring 10 min 10 min 94 88 1 6 1 94 87 1 6 1                       Patient's response to therapy: Increase fatique, Good effort and Good motivation  Comment:s      Assessment    Patient's response: Patient progressing towardsd LTGs evident by: Increased PLB and Improved Aerobic capicity and endurance.     Plan: Continue as written    Code     Billin units      Physician supervision provided this date of service by: Dr Zackery Rojo     Therapist Signature: RT Colt    Therapist PRINTED Name and Credential: RT Colt    2017  8:02 AM

## 2017-04-27 ENCOUNTER — HOSPITAL ENCOUNTER (OUTPATIENT)
Dept: PULMONOLOGY | Age: 72
Discharge: HOME | End: 2017-04-27
Payer: MEDICARE

## 2017-04-27 PROCEDURE — G0424 PULMONARY REHAB W EXER: HCPCS

## 2017-04-27 NOTE — PROGRESS NOTES
[facility]  Outpatient Pulmonary Rehabilitation Flowsheet  [address]   [facility]     Christ Dad  1945       Patient's carry-over of treatment delivered by: PLB while exercising with moderate cues. Objective Daily Findings    Comments:    Respiratory Muscle Functioning/Exercise Conditioning/Strengthening Session:    Time In: 1240  Time Out::0140  Session Number: 20  O2 with Theapy: 0 L/min    Pre SPO2 94  Pre HR:100  Pre BP: not recorded    RR: 17  Wt: not taken by pt  Temp: not taken by pt   Post SPO2: 97 Post HR: 96  Post BP: 102/68    Self Care Home Management Instruction/Education    Self Care Home Management Instruction Education: Breathing Retaining and Stress Management and Pacing. Patient's Response to Education: Patient actively participated in education and Able to demonstrate. Comments: Individual Therapy               Goal     Actual                      During Therapy                 Post-Therapy     % SpO2 HR RPD 1 - 4 RPE 6-20 Pain  1 - 10 % SpO2 HR RPD 1 - 4 RPE 6-20 Pain 0 - 10   Stretching/DB/  Monitoring 10 min 10 min 96 96 2 13 1 95 95 1 6 1   IS 2000 ml  20 min 2000 ml  20 min 94 99 1 10 1 94 97 1 6 1   IMT 13 cm  15 min 12 cm  15 min 93 96 1 8 1 94 96 1 6 1   Arm Bike               Timed Ambulation 15 min 15 min 89 105 2 14 1 96 96 2 13 1     Patient's Response to Therapy: Increase dyspnea, Increase fatique, Good effort and Good motivation.   Comments:    Time In: 0140     Session Number:21  Time Out: 0215     O2 with therapy: 0 L/min         Individual Therapy               Goal     Actual                      During Therapy           Post Therapy     % SpO2 HR RPD 1 - 4 RPE 6-20 Pain  1 - 10 % SpO2 HR RPD 1 - 4 RPE 6-20 Pain 0 - 10   Stepper L6  30 min L6-5 min  L5-25 min 93 96 1 13 1 95 93 1 10 1   Treadmill               Leg Bike               Stretching/DB/  Monitoring 5 min 5 min 95 93 1 10 1 97 96 1 6 1                       Patient's response to therapy: Increase fatique, Good effort and Good motivation  Comment:s      Assessment    Patient's response: Patient progressing towardsd LTGs evident by: Increased PLB and Improved Aerobic capicity and endurance.     Plan: Continue as written    Code     Billin units      Physician supervision provided this date of service by: Dr Audrey Mustafa     Therapist Signature: RT Casie    Therapist PRINTED Name and Credential: RT Casie    2017  8:16 AM

## 2017-05-02 ENCOUNTER — APPOINTMENT (OUTPATIENT)
Dept: PULMONOLOGY | Age: 72
End: 2017-05-02
Payer: MEDICARE

## 2017-05-09 ENCOUNTER — HOSPITAL ENCOUNTER (OUTPATIENT)
Dept: PULMONOLOGY | Age: 72
Discharge: HOME | End: 2017-05-09
Payer: MEDICARE

## 2017-05-09 PROCEDURE — G0424 PULMONARY REHAB W EXER: HCPCS

## 2017-05-09 NOTE — PROGRESS NOTES
[facility]  Outpatient Pulmonary Rehabilitation Flowsheet  [address] [facility]     Cheryle Poole  1945       Patient's carry-over of treatment delivered by: Pt reports more stamina during her day. Objective Daily Findings    Comments:    Respiratory Muscle Functioning/Exercise Conditioning/Strengthening Session:    Time In: 1230  Time Out::0130  Session Number: 22  O2 with Theapy: 0 L/min    Pre SPO2 96  Pre HR:96  Pre BP: 143/76    RR: 18  Wt: not taken by pt  Temp: not taken by pt   Post SPO2: 96 Post HR: 85  Post BP: 119/70    Self Care Home Management Instruction/Education    Self Care Home Management Instruction Education: Relaxation Techniques. Patient's Response to Education: Patient actively participated in education. Comments: Individual Therapy               Goal     Actual                      During Therapy                 Post-Therapy     % SpO2 HR RPD 1 - 4 RPE 6-20 Pain  1 - 10 % SpO2 HR RPD 1 - 4 RPE 6-20 Pain 0 - 10   Stretching/DB/  Monitoring 20 min 20 min 94 101 1 6 1 94 97 1 6 1   IS 2000 ml  20 min 2691-2284  20 min 96 97 1 7 1 96 96 1 6 1   IMT               Timed Ambulation 20 min 20 min 90 105 2 11 2 94 97 1 6 1   Weighted DB                 Patient's Response to Therapy: Increase dyspnea, Increase fatique, Increase pain: Location: ride side of neck-muscles, Good effort and Good motivation.   Comments:    Time In: 0130     Session Number:23  Time Out: 0205     O2 with therapy: 0 L/min         Individual Therapy               Goal     Actual                      During Therapy           Post Therapy     % SpO2 HR RPD 1 - 4 RPE 6-20 Pain  1 - 10 % SpO2 HR RPD 1 - 4 RPE 6-20 Pain 0 - 10   Stepper               Treadmill               Leg Bike L3  30 min L3-20  L2-10 min 93 105 2 13 1 99 99 1 6 1   Stretching/DB/  Monitoring 5 min 5 min 97 93 1 6 1 98 95 1 6 1                       Patient's response to therapy: Increase dyspnea, Increase fatique, Good effort and Good motivation  Comment:s      Assessment    Patient's response: Patient progressing towardsd LTGs evident by:  Decreased Fatique, Increased PLB, Increased DB and Improved Aerobic capicity and endurance.     Plan: Continue as written    Code     Billin units      Physician supervision provided this date of service by: Dr Denise Roberts     Therapist Signature: RT Caleb    Therapist PRINTED Name and Credential: RT Caleb    2017  1:08 PM

## 2017-05-11 ENCOUNTER — HOSPITAL ENCOUNTER (OUTPATIENT)
Dept: PULMONOLOGY | Age: 72
Discharge: HOME | End: 2017-05-11
Payer: MEDICARE

## 2017-05-11 PROCEDURE — G0424 PULMONARY REHAB W EXER: HCPCS

## 2017-05-11 NOTE — PROGRESS NOTES
[facility]  Outpatient Pulmonary Rehabilitation Flowsheet  [address] [facility]     Rhonda Velazquez  1945       Patient's carry-over of treatment delivered by: PLB while exercising with few cues. Objective Daily Findings    Comments:60 Day PHQ-9 is 5    Respiratory Muscle Functioning/Exercise Conditioning/Strengthening Session:    Time In: 9231  Time XRT::6039  Session Number: 24  O2 with Theapy: 0 L/min    Pre SPO2 96  Pre HR:83  Pre BP: 139/66    RR: 17  Wt: not taken by pt  Temp: not taken by pt   Post SPO2: 94 Post HR: 75  Post BP: 116/62    Self Care Home Management Instruction/Education    Self Care Home Management Instruction Education: Medications and Metered Dose Inhaler (MDI). Patient's Response to Education: Patient actively participated in education and Able to demonstrate. Comments: Individual Therapy               Goal     Actual                      During Therapy                 Post-Therapy     % SpO2 HR RPD 1 - 4 RPE 6-20 Pain  1 - 10 % SpO2 HR RPD 1 - 4 RPE 6-20 Pain 0 - 10   Stretching/DB/  Monitoring 10 min 10 min 94 86 1 6 1 94 78 1 6 1   IS 2000 ml  20 min 1750 ml  20 min 96 83 1 7 1 96 85 1 6 1   IMT 13 cm  20 m 13 cm  20 min 94 78 1 9 1 94 75 1 6 1   Bike L3  30 min L3-5 min  L2-5 min 93 94 2 14 1 94 86 1 6 1   Weighted DB                 Patient's Response to Therapy: Increase dyspnea, Increase fatique, Good effort and Good motivation.   Comments:    Time In: 0135     Session Number:25  Time Out: 0215     O2 with therapy: 0 L/min         Individual Therapy               Goal     Actual                      During Therapy           Post Therapy     % SpO2 HR RPD 1 - 4 RPE 6-20 Pain  1 - 10 % SpO2 HR RPD 1 - 4 RPE 6-20 Pain 0 - 10   Stepper L6  30 min L6-5 min  L5-30 min 95 90 2 15 2 93 84 1 6 1   Treadmill               Leg Bike               Stretching/DB/  Monitoring 5 min 5 min 93 84 1 6 1 95 78 1 6 1                       Patient's response to therapy: Increase dyspnea, Increase fatique and Increase pain: Location: thighs  Comment:s      Assessment    Patient's response: Patient progressing towardsd LTGs evident by: Increased PLB and Improved ventilatory muscle (diaphragm) strength and endurance.     Plan: Continue as written    Code     Billin units      Physician supervision provided this date of service by: Dr Sylvia Morrison     Therapist Signature: RT Hayley    Therapist PRINTED Name and Credential: RT Hayley    2017  8:53 AM

## 2017-05-16 ENCOUNTER — HOSPITAL ENCOUNTER (OUTPATIENT)
Dept: PULMONOLOGY | Age: 72
Discharge: HOME | End: 2017-05-16
Payer: MEDICARE

## 2017-05-16 PROCEDURE — G0424 PULMONARY REHAB W EXER: HCPCS

## 2017-05-16 NOTE — PROGRESS NOTES
[facility]  Outpatient Pulmonary Rehabilitation Flowsheet  [address] [facility]  1945       Patient's carry-over of treatment delivered by: pacing noted while exercising. Objective Daily Findings    Comments:    Respiratory Muscle Functioning/Exercise Conditioning/Strengthening Session:    Time In: 1230  Time Out::0130  Session Number: 26  O2 with Theapy: 0 L/min    Pre SPO2 95  Pre HR:86  Pre BP: 115/70    RR: 18  Wt: not taken by pt  Temp: not taken by pt   Post SPO2: 97 Post HR: 92  Post BP: 121/69    Self Care Home Management Instruction/Education    Self Care Home Management Instruction Education: Collaborative Self Managment and Relaxation Techniques. Patient's Response to Education: Patient actively participated in education. Comments: Individual Therapy               Goal     Actual                      During Therapy                 Post-Therapy     % SpO2 HR RPD 1 - 4 RPE 6-20 Pain  1 - 10 % SpO2 HR RPD 1 - 4 RPE 6-20 Pain 0 - 10   Stretching/DB/  Monitoring 15 min 15 min 97 98 1 6 1 97 92 1 6 1   IS 2000 ml  20 min 2000 ml  20 min 96 89 1 7 1 97 86 1 6 1   IMT               Treadmill 1.5 mph  30 min 1.5-15 min  1.0-10 min 96 103 1 13 1 97 98 1 6 1   Weighted DB                 Patient's Response to Therapy: Increase fatique, Good effort and Good motivation.   Comments:    Time In: 0130     Session Number:27  Time Out: 0210     O2 with therapy: 0 L/min         Individual Therapy               Goal     Actual                      During Therapy           Post Therapy     % SpO2 HR RPD 1 - 4 RPE 6-20 Pain  1 - 10 % SpO2 HR RPD 1 - 4 RPE 6-20 Pain 0 - 10   Stepper L6  30 min L6-10 min  L5-25 min 94 96 1 12 1 95 95 1 6 1   Treadmill               Leg Bike               Stretching/DB/  Monitoring 5 min 5 min 95 95 1 6 1 96 92 1 6 1                       Patient's response to therapy: Increase fatique, Good effort and Good motivation  Comment:s      Assessment    Patient's response: Patient progressing towardsd LTGs evident by: Increased Pacing and Improved Aerobic capicity and endurance.     Plan: Continue as written    Code     Billin units      Physician supervision provided this date of service by: Dr Jluis Camejo     Therapist Signature: RT Erika    Therapist PRINTED Name and Credential: RT Erika    2017  11:27 AM

## 2017-05-23 ENCOUNTER — HOSPITAL ENCOUNTER (OUTPATIENT)
Dept: PULMONOLOGY | Age: 72
Discharge: HOME | End: 2017-05-23
Payer: MEDICARE

## 2017-05-23 PROCEDURE — G0424 PULMONARY REHAB W EXER: HCPCS

## 2017-05-23 NOTE — PROGRESS NOTES
[facility]  Outpatient Pulmonary Rehabilitation Flowsheet  [address] [facility]     Deleta Kirti  1945       Patient's carry-over of treatment delivered by: PLB while exercising with light cueing. Objective Daily Findings    Comments:    Respiratory Muscle Functioning/Exercise Conditioning/Strengthening Session:    Time In: 1230  Time Out::0130  Session Number: 28  O2 with Theapy: 0 L/min    Pre SPO2 94  Pre HR:80  Pre BP: 117/66    RR: 18  Wt: not taken by pt  Temp: not taken by pt   Post SPO2: 96 Post HR: 89  Post BP: 107/64    Self Care Home Management Instruction/Education    Self Care Home Management Instruction Education: Exercise Concepts, Collaborative Self Managment and Body Mechanics & Energy Conversation/Work Simplification (EC/WS) Technique, MDI Demo. Patient's Response to Education: Patient actively participated in education. Comments: Individual Therapy               Goal     Actual                      During Therapy                 Post-Therapy     % SpO2 HR RPD 1 - 4 RPE 6-20 Pain  1 - 10 % SpO2 HR RPD 1 - 4 RPE 6-20 Pain 0 - 10   Stretching/DB/  Monitoring 10 min 10 min 98 103 1 8 2 97 95 1 6 1   IS 2000 ml  20 min 9594-2397  20 min 94 82 1 7 1 95 85 1 6 1   IMT               Bike L3  30 min L3-5 min  L2-25 min 91 103 1 13 2 98 103 1 8 2   Weighted DB                 Patient's Response to Therapy: Increase fatique, Increase heart rate, Increase pain: Location: right thigh, Good effort and Good motivation.   Comments:    Time In: 0130     Session Number:29  Time Out: 0205     O2 with therapy: 0 L/min         Individual Therapy               Goal     Actual                      During Therapy           Post Therapy     % SpO2 HR RPD 1 - 4 RPE 6-20 Pain  1 - 10 % SpO2 HR RPD 1 - 4 RPE 6-20 Pain 0 - 10   Stepper               Treadmill               Leg Bike               Stretching/DB/  Monitoring 15 min 15 min 94 99 2 10 3 96 89 1 6 1   Timed Ambulation 20 min 20 min 92 102 2 15 3 94 99 2 10 3        Patient's response to therapy: Increase dyspnea, Increase fatique, Increase heart rate, Increase pain: Location: tightness over upper chest area, Good effort and Good motivation  Comment:s      Assessment    Patient's response: Patient progressing towardsd LTGs evident by: Increased PLB and Improved Aerobic capicity and endurance.     Plan: Continue as written    Code     Billin units      Physician supervision provided this date of service by: Dr Kenyon Moncada     Therapist Signature: RT Chavez    Therapist PRINTED Name and Credential: RT Chavez    2017  11:14 AM

## 2017-05-25 ENCOUNTER — HOSPITAL ENCOUNTER (OUTPATIENT)
Dept: PULMONOLOGY | Age: 72
Discharge: HOME | End: 2017-05-25
Payer: MEDICARE

## 2017-05-25 PROCEDURE — G0424 PULMONARY REHAB W EXER: HCPCS

## 2017-05-25 NOTE — PROGRESS NOTES
[facility]  Outpatient Pulmonary Rehabilitation Flowsheet  [address] [facility]     Dub Dire  1945       Patient's carry-over of treatment delivered by: PLB while exercising with less cueing. Objective Daily Findings    Comments:    Respiratory Muscle Functioning/Exercise Conditioning/Strengthening Session:    Time In: 1230  Time Out::0130  Session Number: 30  O2 with Theapy: 0 L/min    Pre SPO2 95  Pre HR:95  Pre BP: 126/75    RR: 16  Wt: 155  Temp: 97.1   Post SPO2: 98 Post HR: 86  Post BP: 116/71    Self Care Home Management Instruction/Education    Self Care Home Management Instruction Education: Exercise Concepts, Body Mechanics & Energy Conversation/Work Simplification (EC/WS) Technique and Relaxation Techniques. Patient's Response to Education: Patient actively participated in education. Comments: Individual Therapy               Goal     Actual                      During Therapy                 Post-Therapy     % SpO2 HR RPD 1 - 4 RPE 6-20 Pain  1 - 10 % SpO2 HR RPD 1 - 4 RPE 6-20 Pain 0 - 10   Stretching/DB/  Monitoring 10 min 10 min 95 99 2 11 2 98 88 1 6 1   IS 2000 ml  20 min 3587-9473  15 min 94 97 1 7 1 95 96 1 6 1   IMT 13 cm  20 min 12 cm  15 min 96 93 1 9 1 96 90 1 6 1   Timed Ambulation 20 min 20 min 92 105 2 15 2 95 99 2 11 2   Weighted DB                 Patient's Response to Therapy: Increase dyspnea, Increase fatique, Increase pain: Location: upper chest tightness, Good effort and Good motivation.   Comments:    Time In: 0130     Session Number:31  Time Out: 0205     O2 with therapy: 0 L/min         Individual Therapy               Goal     Actual                      During Therapy           Post Therapy     % SpO2 HR RPD 1 - 4 RPE 6-20 Pain  1 - 10 % SpO2 HR RPD 1 - 4 RPE 6-20 Pain 0 - 10   Stepper L6  30 min L6-15 min  L5-15 min 94 100 1 7 2 95 97 1 6 2   Treadmill               Leg Bike               Stretching/DB/  Monitoring 5 min 5 min 95 97 1 6 2 98 86 1 6 1                       Patient's response to therapy: Increase pain: Location: right knee, Good effort and Good motivation  Comment:s      Assessment    Patient's response: Patient progressing towardsd LTGs evident by: Increased PLB and Improved Aerobic capicity and endurance.     Plan: Continue as written    Code     Billin units      Physician supervision provided this date of service by: Dr Dakota Unger     Therapist Signature: RT Charmaine    Therapist PRINTED Name and Credential: RT Charmaine    2017  10:22 AM

## 2017-05-30 ENCOUNTER — HOSPITAL ENCOUNTER (OUTPATIENT)
Dept: PULMONOLOGY | Age: 72
Discharge: HOME | End: 2017-05-30
Payer: MEDICARE

## 2017-05-30 PROCEDURE — G0424 PULMONARY REHAB W EXER: HCPCS

## 2017-05-30 NOTE — PROGRESS NOTES
[facility]  Outpatient Pulmonary Rehabilitation Flowsheet  [address] [facility]     Dub Dire  1945       Patient's carry-over of treatment delivered by: more pacing noted    Objective Daily Findings    Comments:    Respiratory Muscle Functioning/Exercise Conditioning/Strengthening Session:    Time In: 7780  Time Out::0130  Session Number: 32  O2 with Theapy: 0 L/min    Pre SPO2 96  Pre HR:79  Pre BP: 110/68    RR: 17  Wt: not taken by pt  Temp:  Not taken by pt   Post SPO2: 98 Post HR: 85  Post BP: 115/69    Self Care Home Management Instruction/Education    Self Care Home Management Instruction Education: Exercise Concepts. Patient's Response to Education: Patient actively participated in education. Comments: Individual Therapy               Goal     Actual                      During Therapy                 Post-Therapy     % SpO2 HR RPD 1 - 4 RPE 6-20 Pain  1 - 10 % SpO2 HR RPD 1 - 4 RPE 6-20 Pain 0 - 10   Stretching/DB/  Monitoring 10 min 10 min 96 79 1 6 1 96 80 1 6 1   IS 2000 ml  20 min 2000 ml  15 min 96 80 1 6 1 96 79 1 6 1   IMT               Stepper L6  30 min L6-20 min  L5-15 min 94 88 1 11 2 96 88 1 6 2   Weighted DB                 Patient's Response to Therapy: Increase fatique, Increase pain: Location: knees, Good effort and Good motivation.   Comments:    Time In: 0130     Session Number:33  Time Out: 0205     O2 with therapy: 0 L/min         Individual Therapy               Goal     Actual                      During Therapy           Post Therapy     % SpO2 HR RPD 1 - 4 RPE 6-20 Pain  1 - 10 % SpO2 HR RPD 1 - 4 RPE 6-20 Pain 0 - 10   Stepper               Treadmill 1.5 mph  30 min 1.2 mph-.5 grade  30 min 93 93 1 8 1 98 87 1 6 1   Leg Bike               Stretching/DB/  Monitoring 5 min 5 min 98 87 1 6 1 98 85 1 6 1                       Patient's response to therapy: Increase fatique, Good effort and Good motivation  Comment:s Assessment    Patient's response: Patient progressing towardsd LTGs evident by: Increased Pacing and Improved Aerobic capicity and endurance.     Plan: Continue as written    Code     Billin units      Physician supervision provided this date of service by: Dr Quyen Pickett     Therapist Signature: RT Gene    Therapist PRINTED Name and Credential: RT Gene    2017  12:56 PM

## 2017-06-06 ENCOUNTER — APPOINTMENT (OUTPATIENT)
Dept: PULMONOLOGY | Age: 72
End: 2017-06-06

## 2017-06-08 ENCOUNTER — APPOINTMENT (OUTPATIENT)
Dept: PULMONOLOGY | Age: 72
End: 2017-06-08

## 2017-06-13 ENCOUNTER — APPOINTMENT (OUTPATIENT)
Dept: PULMONOLOGY | Age: 72
End: 2017-06-13

## 2017-06-15 ENCOUNTER — APPOINTMENT (OUTPATIENT)
Dept: PULMONOLOGY | Age: 72
End: 2017-06-15

## 2017-06-20 ENCOUNTER — APPOINTMENT (OUTPATIENT)
Dept: PULMONOLOGY | Age: 72
End: 2017-06-20

## 2017-06-22 ENCOUNTER — APPOINTMENT (OUTPATIENT)
Dept: PULMONOLOGY | Age: 72
End: 2017-06-22

## 2020-01-12 NOTE — ED TRIAGE NOTES
One week history of left inner wrist pain in between bones. No swelling or deformity noted. Radial pulses strong. No known trauma.  Increased pain with twisting movement Patient and patient's wife updated on plan of care for amio drip at this time and stat transfer to [facility]. Pt and patient's wife verbalized understanding.

## 2020-02-10 ENCOUNTER — HOSPITAL ENCOUNTER (OUTPATIENT)
Age: 75
Discharge: HOME | End: 2020-02-10
Attending: PSYCHIATRY & NEUROLOGY
Payer: MEDICARE

## 2020-02-10 DIAGNOSIS — M54.16 RADICULOPATHY, LUMBAR REGION: ICD-10-CM

## 2020-02-10 PROCEDURE — 72148 MRI LUMBAR SPINE W/O DYE: CPT

## 2020-04-02 ENCOUNTER — VIRTUAL VISIT (OUTPATIENT)
Dept: ORTHOPEDIC SURGERY | Age: 75
End: 2020-04-02

## 2020-04-02 DIAGNOSIS — M48.061 SPINAL STENOSIS OF LUMBAR REGION, UNSPECIFIED WHETHER NEUROGENIC CLAUDICATION PRESENT: ICD-10-CM

## 2020-04-02 DIAGNOSIS — M43.10 SPONDYLOLISTHESIS, ACQUIRED: ICD-10-CM

## 2020-04-02 DIAGNOSIS — G60.9 IDIOPATHIC PERIPHERAL NEUROPATHY: ICD-10-CM

## 2020-04-02 DIAGNOSIS — M47.817 LUMBOSACRAL SPONDYLOSIS WITHOUT MYELOPATHY: Primary | ICD-10-CM

## 2020-04-02 DIAGNOSIS — M54.16 LUMBAR NEURITIS: ICD-10-CM

## 2020-04-02 DIAGNOSIS — M51.36 DDD (DEGENERATIVE DISC DISEASE), LUMBAR: ICD-10-CM

## 2020-04-02 RX ORDER — GABAPENTIN 800 MG/1
800 TABLET ORAL 3 TIMES DAILY
Qty: 90 TAB | Refills: 1 | Status: SHIPPED | OUTPATIENT
Start: 2020-04-02 | End: 2020-04-06 | Stop reason: SDUPTHER

## 2020-04-02 RX ORDER — MELOXICAM 15 MG/1
15 TABLET ORAL AS NEEDED
COMMUNITY
Start: 2020-03-06

## 2020-04-02 RX ORDER — UMECLIDINIUM BROMIDE AND VILANTEROL TRIFENATATE 62.5; 25 UG/1; UG/1
1 POWDER RESPIRATORY (INHALATION) DAILY
COMMUNITY
Start: 2020-03-18 | End: 2020-09-10

## 2020-04-02 NOTE — PROGRESS NOTES
[facility]  [address], [address]  Phone: [phone]  Fax: [phone]        INITIAL CONSULTATION    CONSENT:  Pursuant to the emergency declaration under the [address] and [employer], 1135 waiver authority and the [employer] Act, this Virtual Visit was conducted, with patient's consent, to reduce the patient's risk of exposure to COVID-19 and provide continuity of care for an established patient. Services were provided through a video synchronous discussion virtually to substitute for in-person appointment. HISTORY OF PRESENT ILLNESS:  Saurabh Pham is a 66 y.o. female whom is a new patient and is being seen via Doxy[facility] Me TeleVisit at the [facility] office. He is referred from Cammie Almazan NP secondary to lumbar radiculopathy. She reports today with c/o progressive, intermittent low back pain into the LLE extending  to the ankle x 8/2019 without trauma. She rates her pain 7/10. Her pain is exacerbated with walking. Positive shopping cart sign. She has completed MDP without benefit. She is currently on Cymbalta 60 mg secondary to depression. She currently takes Neurontin 600 mg TID as prescribed by Dr. Korin Bowden. Pt denies change in bowel or bladder habits. Pt denies fever, weight loss, or skin changes. Pt denies any signs of weakness. Patient denies previous spinal surgery, injections, or physical therapy/chiropractic care. Pt denies h/o chemotherapy, gastric bypass, alcohol abuse, or DM. PmHx of depression. Note from Dr. Korin Bowden dated 1/8/20 indicating patient was seen with c/o pain and burning in the left leg. Indicated patient was on Cymbalta 60 mg and Neurontin 300 mg BID. Note from Dr. Korin Bowden dated 2/19/2020 indicating patient reports no change since last visit. Indicated she endorsed better benefit with the qhs dose of Neurontin.  Indicated they increased her Neurontin to 600 mg TID and started her on a MDP. L Spine MRI dated 2/10/2020 films reviewed. Per report, grade 1 anterolistheses at L4-5 and L5-S1. Mild spinal canal stenosis at L2-3. Moderate spinal canal stenoses from L3-4 to L5-S1. Mild foraminal stenoses at L3-4 and L4-5. Moderate to severe foraminal stenoses at L5-S1 with potential L5 nerve root compression. Complex left renal cyst with fluid-hemorrhage level. Other small simple renal cysts LLE EMG dated 2020 suggested: sensorimotor peripheral neuropathy and chronic L5 and S1 radiculopathy.  reviewed. There is no height or weight on file to calculate BMI. PCP: Lex Ji MD    Past Medical History:   Diagnosis Date    Allergic rhinitis     on allergy shots    Anemia     Asthma     Borderline diabetic     Cataract     Chronic lung disease     Cigarette smoker     abuse quit     COPD (chronic obstructive pulmonary disease) ([facility] [address]     Degenerative arthritis     both shoulders    Depression     Easy bruising     Hypertension     Nervousness     Osteoporosis 10/27/99          Past Surgical History:   Procedure Laterality Date    HX GYN  1974    hysterectomy    HX GYN  1970    D & C    HX HEENT  age 3    tonsillectomy    HX ORTHOPAEDIC  age 21    right leg tumor removed benign         Social History     Tobacco Use    Smoking status: Former Smoker     Packs/day: 2.50     Years: 45.00     Pack years: 112.50     Types: Cigarettes     Last attempt to quit: 2006     Years since quittin.2    Smokeless tobacco: Never Used    Tobacco comment: smoked for 50 years   Substance Use Topics    Alcohol use:  Yes     Alcohol/week: 2.0 - 3.0 standard drinks     Types: 2 - 3 Standard drinks or equivalent per week     Comment: occ       Current Outpatient Medications   Medication Sig Dispense Refill    meloxicam (MOBIC) 15 mg tablet       Anoro Ellipta 62.5-25 mcg/actuation inhaler       DULoxetine (CYMBALTA) 20 mg capsule Take 90 mg by mouth daily.  gabapentin (NEURONTIN) 100 mg capsule 600 mg.  cyclobenzaprine (FLEXERIL) 5 mg tablet Take 5 mg by mouth three (3) times daily as needed for Muscle Spasm(s).  lisinopril (PRINIVIL, ZESTRIL) 10 mg tablet TAKE 1 TABLET BY MOUTH DAILY 30 Tab 0    sertraline (ZOLOFT) 100 mg tablet TAKE 1 TABLET BY MOUTH EVERY DAY 30 Tab 6    albuterol (VENTOLIN HFA) 90 mcg/actuation inhaler Take 2 Puffs by inhalation every four (4) hours as needed for Wheezing. 1 Inhaler 2    diazepam (VALIUM) 5 mg tablet Take 1 Tab by mouth two (2) times daily as needed for Anxiety. 60 Tab 2    pravastatin (PRAVACHOL) 20 mg tablet          Allergies   Allergen Reactions    Qvar [Beclomethasone Dipropionate] Shortness of Breath    Entex [Phenylephrine-Guaifenesin] Other (comments)     intolerance    Sulfa (Sulfonamide Antibiotics) Other (comments)            Family History   Problem Relation Age of Onset   [address] [facility] Arthritis-rheumatoid Mother     Hypertension Mother     Headache Mother     Elevated Lipids Mother     Lung Disease Father     Cancer Maternal Aunt     Lung Disease Maternal Uncle          REVIEW OF SYSTEMS  Constitutional symptoms: Negative  Eyes: Negative  Ears, Nose, Throat, and Mouth: Negative  Cardiovascular: Negative  Respiratory: Negative  Genitourinary: Negative  Integumentary (Skin and/or breast): Negative  Musculoskeletal: Per patient, she is positive for lumbar pain and LLE pain  Extremities: Negative for edema. Endocrine/Rheumatologic: Negative  Hematologic/Lymphatic: Negative  Allergic/Immunologic: Negative  Psychiatric: Negative       PHYSICAL EXAMINATION  Unable to perform examination secondary to COVID-19. CONSTITUTIONAL: NAD, A&O x 3      ASSESSMENT   Diagnoses and all orders for this visit:    1. Lumbosacral spondylosis without myelopathy  -     gabapentin (Neurontin) 800 mg tablet; Take 1 Tab by mouth three (3) times daily.  Max Daily Amount: 2,400 mg.    2. Spinal stenosis of lumbar region, unspecified whether neurogenic claudication present  -     gabapentin (Neurontin) 800 mg tablet; Take 1 Tab by mouth three (3) times daily. Max Daily Amount: 2,400 mg.    3. Lumbar neuritis  -     gabapentin (Neurontin) 800 mg tablet; Take 1 Tab by mouth three (3) times daily. Max Daily Amount: 2,400 mg.    4. DDD (degenerative disc disease), lumbar  -     gabapentin (Neurontin) 800 mg tablet; Take 1 Tab by mouth three (3) times daily. Max Daily Amount: 2,400 mg.    5. Idiopathic peripheral neuropathy  -     gabapentin (Neurontin) 800 mg tablet; Take 1 Tab by mouth three (3) times daily. Max Daily Amount: 2,400 mg.    6. Spondylolisthesis, acquired  -     gabapentin (Neurontin) 800 mg tablet; Take 1 Tab by mouth three (3) times daily. Max Daily Amount: 2,400 mg. IMPRESSIONS/RECOMMENDATIONS:  The patient consented to the tele health visit and was aware that there would be a charge. During today's Doxy. Me TeleVisit patient had c/o progressive, intermittent low back pain into the LLE extending to the ankle . Multiple treatment options were discussed, which are limited at this point due to COVID-19. Pt denies any weakness. I will notify Kanika Lopez MD of the left renal cyst noted on MRI films. I will increase her Neurontin from 600 mg TID to 800 mg TID. Patient advised to call the office if intolerant to higher dose. I will see the patient back in 1 month's time or earlier if needed. Written by Oliva Adams, as dictated by Lin Sharma MD  I examined the patient, reviewed and agree with the note.

## 2020-04-06 ENCOUNTER — TELEPHONE (OUTPATIENT)
Dept: ORTHOPEDIC SURGERY | Age: 75
End: 2020-04-06

## 2020-04-06 DIAGNOSIS — G60.9 IDIOPATHIC PERIPHERAL NEUROPATHY: ICD-10-CM

## 2020-04-06 DIAGNOSIS — M54.16 LUMBAR NEURITIS: ICD-10-CM

## 2020-04-06 DIAGNOSIS — M43.10 SPONDYLOLISTHESIS, ACQUIRED: ICD-10-CM

## 2020-04-06 DIAGNOSIS — M48.061 SPINAL STENOSIS OF LUMBAR REGION, UNSPECIFIED WHETHER NEUROGENIC CLAUDICATION PRESENT: ICD-10-CM

## 2020-04-06 DIAGNOSIS — M47.817 LUMBOSACRAL SPONDYLOSIS WITHOUT MYELOPATHY: ICD-10-CM

## 2020-04-06 DIAGNOSIS — M51.36 DDD (DEGENERATIVE DISC DISEASE), LUMBAR: ICD-10-CM

## 2020-04-06 RX ORDER — GABAPENTIN 800 MG/1
800 TABLET ORAL 3 TIMES DAILY
Qty: 90 TAB | Refills: 1 | Status: SHIPPED | OUTPATIENT
Start: 2020-04-06 | End: 2020-06-29 | Stop reason: SDUPTHER

## 2020-04-06 NOTE — TELEPHONE ENCOUNTER
Patients daughter is requesting we re-route rx gabapentin (Neurontin) 800 mg tablet to [address] [place] [address] on file - prescription sent to wrong pharmacy.

## 2020-04-06 NOTE — TELEPHONE ENCOUNTER
I sent the neurontin to the [facility] pharmacy on file.   Please call the [facility] and cancel the neurontin that was sent there

## 2020-04-15 ENCOUNTER — TELEPHONE (OUTPATIENT)
Dept: ORTHOPEDIC SURGERY | Age: 75
End: 2020-04-15

## 2020-04-15 NOTE — TELEPHONE ENCOUNTER
Ms. Marcos Vargas from [facility] and said they never received the order/ script for the Gabapentin medication that was prescribed on 4/6/20 by Raven Spaulding. Ms. Marcos Vargas is requesting the order be sent to [facility] on [address] in [place] [address].

## 2020-05-07 ENCOUNTER — VIRTUAL VISIT (OUTPATIENT)
Dept: ORTHOPEDIC SURGERY | Age: 75
End: 2020-05-07

## 2020-05-07 DIAGNOSIS — M47.817 LUMBOSACRAL SPONDYLOSIS WITHOUT MYELOPATHY: Primary | ICD-10-CM

## 2020-05-07 DIAGNOSIS — M43.10 SPONDYLOLISTHESIS, ACQUIRED: ICD-10-CM

## 2020-05-07 DIAGNOSIS — M48.061 SPINAL STENOSIS OF LUMBAR REGION, UNSPECIFIED WHETHER NEUROGENIC CLAUDICATION PRESENT: ICD-10-CM

## 2020-05-07 DIAGNOSIS — G60.9 IDIOPATHIC PERIPHERAL NEUROPATHY: ICD-10-CM

## 2020-05-07 DIAGNOSIS — M51.36 DDD (DEGENERATIVE DISC DISEASE), LUMBAR: ICD-10-CM

## 2020-05-07 DIAGNOSIS — M54.16 LUMBAR NEURITIS: ICD-10-CM

## 2020-05-07 RX ORDER — PREGABALIN 50 MG/1
50 CAPSULE ORAL 2 TIMES DAILY
Qty: 60 CAP | Refills: 1 | Status: SHIPPED | OUTPATIENT
Start: 2020-05-07 | End: 2022-05-27

## 2020-05-07 NOTE — PROGRESS NOTES
[facility]  [address], [place]   Phone: [phone]  Fax: [phone]        PROGRESS NOTE    CONSENT:  Pursuant to the emergency declaration under the [address] and [facility] 1135 waiver authority and the [employer] Act, this Virtual Visit was conducted, with patient's consent, to reduce the patient's risk of exposure to COVID-19 and provide continuity of care for an established patient. Services were provided through a video synchronous discussion virtually to substitute for in-person appointment. ENCOUNTER (minutes): 18    HISTORY OF PRESENT ILLNESS:  The patient is a 66 y.o. female and is being via [email or web address] TeleVisit at the [facility] office for follow up of progressive, intermittent low back pain into the LLE extending  to the ankle x 8/2019 without trauma. Her pain is exacerbated with walking. Positive shopping cart sign. She has completed MDP without benefit. She is currently on Cymbalta 90 mg secondary to depression. Patient failed NEURONTIN 800 mg TID secondary to no relief. Pt denies change in bowel or bladder habits. Pt denies fever, weight loss, or skin changes. Pt denies any signs of weakness. Patient denies previous spinal surgery, injections, or physical therapy/chiropractic care. Pt denies h/o chemotherapy, gastric bypass, alcohol abuse, or DM. PmHx of depression. Note from Dr. Do Clinton dated 1/8/20 indicating patient was seen with c/o pain and burning in the left leg. Indicated patient was on Cymbalta 60 mg and Neurontin 300 mg BID at that time. Note from Dr. Do Clinton dated 2/19/2020 indicating patient reports no change since last visit. Indicated she endorsed better benefit with the qhs dose of Neurontin. Indicated they increased her Neurontin to 600 mg TID and started her on a MDP at that time. L Spine MRI dated 2/10/2020 films reviewed.  Per report, grade 1 anterolistheses at L4-5 and L5-S1. Mild spinal canal stenosis at L2-3. Moderate spinal canal stenoses from L3-4 to L5-S1. Mild foraminal stenoses at L3-4 and L4-5. Moderate to severe bilateral foraminal stenoses at L5-S1 with potential L5 nerve root compression. Complex left renal cyst with fluid-hemorrhage level. Other small simple renal cysts. LLE EMG dated 1/17/2020 suggested: sensorimotor peripheral neuropathy and chronic L5 and S1 radiculopathy. At her last clinical appointment, I notified Sandra Son MD of the left renal cyst noted on MRI films. I increased her Neurontin from 600 mg TID to 800 mg TID. At today's video consultation, the patient reports her pain location and distribution remains unchanged. She rates her pain 7/10, unchanged. Her pain continues to be exacerbated with standing and walking. She is tolerating the increased dose of Neurontin 800 mg TID without relief. She continues on Cymbalta 90 mg daily for her depression. She denies h/o seizures. Pt denies change in bowel or bladder habits. Pt denies any signs of weakness.  reviewed. There is no height or weight on file to calculate BMI.     PCP: Sandra Son MD      Past Medical History:   Diagnosis Date    Allergic rhinitis     on allergy shots    Anemia     Asthma     Borderline diabetic     Cataract     Chronic lung disease     Cigarette smoker     abuse quit 2006    COPD (chronic obstructive pulmonary disease) ([facility] [address]     Degenerative arthritis     both shoulders    Depression     Easy bruising     Hypertension     Nervousness     Osteoporosis 10/27/99        Social History     Socioeconomic History    Marital status:      Spouse name: Not on file    Number of children: Not on file    Years of education: Not on file    Highest education level: Not on file   Occupational History    Not on file   Social Needs    Financial resource strain: Not on file    Food insecurity     Worry: Not on file     Inability: Not on file    Transportation needs     Medical: Not on file     Non-medical: Not on file   Tobacco Use    Smoking status: Former Smoker     Packs/day: 2.50     Years: 45.00     Pack years: 112.50     Types: Cigarettes     Last attempt to quit: 2006     Years since quittin.3    Smokeless tobacco: Never Used    Tobacco comment: smoked for 50 years   Substance and Sexual Activity    Alcohol use: Yes     Alcohol/week: 2.0 - 3.0 standard drinks     Types: 2 - 3 Standard drinks or equivalent per week     Comment: occ    Drug use: No    Sexual activity: Not Currently   Lifestyle    Physical activity     Days per week: Not on file     Minutes per session: Not on file    Stress: Not on file   Relationships    Social connections     Talks on phone: Not on file     Gets together: Not on file     Attends Caodaism service: Not on file     Active member of club or organization: Not on file     Attends meetings of clubs or organizations: Not on file     Relationship status: Not on file    Intimate partner violence     Fear of current or ex partner: Not on file     Emotionally abused: Not on file     Physically abused: Not on file     Forced sexual activity: Not on file   Other Topics Concern    Not on file   Social History Narrative    Not on file       Current Outpatient Medications   Medication Sig Dispense Refill    gabapentin (Neurontin) 800 mg tablet Take 1 Tab by mouth three (3) times daily. Max Daily Amount: 2,400 mg. 90 Tab 1    meloxicam (MOBIC) 15 mg tablet       Anoro Ellipta 62.5-25 mcg/actuation inhaler       DULoxetine (CYMBALTA) 20 mg capsule Take 90 mg by mouth daily.  cyclobenzaprine (FLEXERIL) 5 mg tablet Take 5 mg by mouth three (3) times daily as needed for Muscle Spasm(s).       lisinopril (PRINIVIL, ZESTRIL) 10 mg tablet TAKE 1 TABLET BY MOUTH DAILY 30 Tab 0    sertraline (ZOLOFT) 100 mg tablet TAKE 1 TABLET BY MOUTH EVERY DAY 30 Tab 6    albuterol (VENTOLIN HFA) 90 mcg/actuation inhaler Take 2 Puffs by inhalation every four (4) hours as needed for Wheezing. 1 Inhaler 2    pravastatin (PRAVACHOL) 20 mg tablet       gabapentin (NEURONTIN) 100 mg capsule 600 mg.      diazepam (VALIUM) 5 mg tablet Take 1 Tab by mouth two (2) times daily as needed for Anxiety. 60 Tab 2       Allergies   Allergen Reactions    Qvar [Beclomethasone Dipropionate] Shortness of Breath    Entex [Phenylephrine-Guaifenesin] Other (comments)     intolerance    Sulfa (Sulfonamide Antibiotics) Other (comments)          PHYSICAL EXAMINATION  Unable to perform examination secondary to COVID-19. CONSTITUTIONAL: NAD, A&O x 3    ASSESSMENT   Diagnoses and all orders for this visit:    1. Lumbosacral spondylosis without myelopathy    2. Spinal stenosis of lumbar region, unspecified whether neurogenic claudication present    3. Lumbar neuritis    4. DDD (degenerative disc disease), lumbar    5. Idiopathic peripheral neuropathy    6. Spondylolisthesis, acquired      IMPRESSION AND PLAN:  The patient consented to the tele health visit and was aware that there would be a charge. During today's [employer] TeleVisit patient had c/o progressive, intermittent low back pain into the LLE extending to the ankle. Multiple treatment options were discussed. Pt appears to be neurologically intact. She was not interested surgical intervention at this time. I offered PT, pt declined. She elected to proceed with blocks. I will order a left-sided L5 and S1 SNRB. Additionally, I will wean her off Neurontin 800 mg TID secondary to no relief. I will try her on Lyrica 50 mg BID. The risks, benefits, and potential side effects of this medication were discussed. Patient understands and wishes to proceed. Patient advised to call the office if intolerant to new medication. I will see the patient back following blocks or earlier if needed.      Written by Cory De La Torre, as dictated by Essie Hale MD  I examined the patient, reviewed and agree with the note.

## 2020-05-08 ENCOUNTER — TELEPHONE (OUTPATIENT)
Dept: ORTHOPEDIC SURGERY | Age: 75
End: 2020-05-08

## 2020-05-18 NOTE — TELEPHONE ENCOUNTER
[employer] D updated the outcome for this PA:  Unfavorable  Request Key: [id number]  PA created on: 2020-05-07 11:13:33 -[id number]

## 2020-05-28 ENCOUNTER — HOSPITAL ENCOUNTER (OUTPATIENT)
Age: 75
Setting detail: OUTPATIENT SURGERY
Discharge: HOME | End: 2020-05-28
Attending: PHYSICAL MEDICINE & REHABILITATION | Admitting: PHYSICAL MEDICINE & REHABILITATION
Payer: MEDICARE

## 2020-05-28 ENCOUNTER — APPOINTMENT (OUTPATIENT)
Dept: GENERAL RADIOLOGY | Age: 75
End: 2020-05-28
Attending: PHYSICAL MEDICINE & REHABILITATION
Payer: MEDICARE

## 2020-05-28 VITALS
HEART RATE: 100 BPM | DIASTOLIC BLOOD PRESSURE: 88 MMHG | OXYGEN SATURATION: 98 % | TEMPERATURE: 98 F | SYSTOLIC BLOOD PRESSURE: 172 MMHG | RESPIRATION RATE: 18 BRPM

## 2020-05-28 PROCEDURE — 76010000009 HC PAIN MGT 0 TO 30 MIN PROC: Performed by: PHYSICAL MEDICINE & REHABILITATION

## 2020-05-28 PROCEDURE — 77030003669 HC NDL SPN COOK -B: Performed by: PHYSICAL MEDICINE & REHABILITATION

## 2020-05-28 PROCEDURE — 74011250637 HC RX REV CODE- 250/637: Performed by: PHYSICAL MEDICINE & REHABILITATION

## 2020-05-28 PROCEDURE — 77030003676 HC NDL SPN MPRI -A: Performed by: PHYSICAL MEDICINE & REHABILITATION

## 2020-05-28 PROCEDURE — 74011250636 HC RX REV CODE- 250/636: Performed by: PHYSICAL MEDICINE & REHABILITATION

## 2020-05-28 PROCEDURE — 74011636320 HC RX REV CODE- 636/320: Performed by: PHYSICAL MEDICINE & REHABILITATION

## 2020-05-28 PROCEDURE — 74011000250 HC RX REV CODE- 250: Performed by: PHYSICAL MEDICINE & REHABILITATION

## 2020-05-28 PROCEDURE — 77030039433 HC TY MYLEOGRAM BD -B: Performed by: PHYSICAL MEDICINE & REHABILITATION

## 2020-05-28 RX ORDER — DIAZEPAM 5 MG/1
5-20 TABLET ORAL ONCE
Status: COMPLETED | OUTPATIENT
Start: 2020-05-28 | End: 2020-05-28

## 2020-05-28 RX ORDER — DEXAMETHASONE SODIUM PHOSPHATE 100 MG/10ML
INJECTION INTRAMUSCULAR; INTRAVENOUS AS NEEDED
Status: DISCONTINUED | OUTPATIENT
Start: 2020-05-28 | End: 2020-05-28 | Stop reason: HOSPADM

## 2020-05-28 RX ORDER — LIDOCAINE HYDROCHLORIDE 10 MG/ML
INJECTION, SOLUTION EPIDURAL; INFILTRATION; INTRACAUDAL; PERINEURAL AS NEEDED
Status: DISCONTINUED | OUTPATIENT
Start: 2020-05-28 | End: 2020-05-28 | Stop reason: HOSPADM

## 2020-05-28 RX ADMIN — DIAZEPAM 10 MG: 5 TABLET ORAL at 12:55

## 2020-05-28 NOTE — DISCHARGE INSTRUCTIONS
[facility]   (BRAD)  Dr. Aurelia Zamorano, Dr. Cleveland, Dr. Norman Mclaughlin not drive a car, operate heavy machinery or dangerous equipment for 24 hours. * Activity as tolerated; rest for the remainder of the day. * Resume pre-block medications including those for your family doctor. * Do not drink alcoholic beverages for 24 hours. Alcohol and the medications you have received may interact and cause an adverse reaction. * You may feel better this evening and worse tomorrow, as the numbing medications wears off and the steroid has yet to begin to work. After 48 hrs the steroid should begin to release bringing you relief. * You may shower this evening and remove any bandages. * Avoid hot tubs and heating pads for 24 hours. You may use cold packs on the procedure site as tolerated for the first 24 hours. * If a headache develops, drink plenty of fluids and rest.  Take over the counter medications for headache if needed. If the headache continues longer than 24 hours, call MD at the [address] [place] [address]. [phone]    * Continue taking pain medications as needed. * You may resume your regular diet if tolerated. Otherwise, start with sips of water and advance slowly. * If Diabetic: check your blood sugar three times a day for the next 3 days. If your sugar is greater than 300 call your family doctor. If your sugar is greater than 400, have someone transport you to the nearest Emergency Room. * If you experience any of the following problems, Please Call the [address] [place] [address] at [phone].         * Shortness of Breath    * Fever of 101 or higher    * Nausea / Vomiting    * Severe Headache    * Weakness or numbness in arms or legs that is not      resolving    * Prolonged increase in pain greater than 4 days      DISCHARGE SUMMARY from Nurse      PATIENT INSTRUCTIONS:    After oral sedation, for 24 hours or while taking prescription Narcotics:  · Limit your activities  · Do not drive and operate hazardous machinery  · Do not make important personal or business decisions  · Do  not drink alcoholic beverages  · If you have not urinated within 8 hours after discharge, please contact your surgeon on call. Report the following to your surgeon:  · Excessive pain, swelling, redness or odor of or around the surgical area  · Temperature over 101  · Nausea and vomiting lasting longer than 4 hours or if unable to take medications  · Any signs of decreased circulation or nerve impairment to extremity: change in color, persistent  numbness, tingling, coldness or increase pain  · Any questions            What to do at Home:  Recommended activity: Activity as tolerated, NO DRIVING FOR 12 Hours post injection          *  Please give a list of your current medications to your Primary Care Provider. *  Please update this list whenever your medications are discontinued, doses are      changed, or new medications (including over-the-counter products) are added. *  Please carry medication information at all times in case of emergency situations. These are general instructions for a healthy lifestyle:    No smoking/ No tobacco products/ Avoid exposure to second hand smoke    Surgeon General's Warning:  Quitting smoking now greatly reduces serious risk to your health. Obesity, smoking, and sedentary lifestyle greatly increases your risk for illness    A healthy diet, regular physical exercise & weight monitoring are important for maintaining a healthy lifestyle    You may be retaining fluid if you have a history of heart failure or if you experience any of the following symptoms:  Weight gain of 3 pounds or more overnight or 5 pounds in a week, increased swelling in our hands or feet or shortness of breath while lying flat in bed.   Please call your doctor as soon as you notice any of these symptoms; do not wait until your next office visit. Recognize signs and symptoms of STROKE:    F-face looks uneven    A-arms unable to move or move unevenly    S-speech slurred or non-existent    T-time-call 911 as soon as signs and symptoms begin-DO NOT go       Back to bed or wait to see if you get better-TIME IS BRAIN.

## 2020-05-28 NOTE — PROCEDURES
PROCEDURE NOTE      Patient Name: Pavel Neal    Date of Procedure: May 28, 2020    Preoperative Diagnosis:  Spondylosis without myelopathy or radiculopathy, lumbosacral region [M47.817]  Spinal stenosis, lumbar region, without neurogenic claudication [M48.061]  Lumbar radiculopathy [M54.16]    Post Operative Diagnosis:  Spondylosis without myelopathy or radiculopathy, lumbosacral region [M47.817]  Spinal stenosis, lumbar region, without neurogenic claudication [M48.061]  Lumbar radiculopathy [M54.16]    Procedure :    left L5 Selective Nerve Root Block  left S1 Selective Nerve Root Block    Consent:  Informed consent was obtained prior to the procedure. The patient was given the opportunity to ask questions regarding the procedure and its associated risks. In addition to the potential risks associated with the procedure itself, the patient was informed both verbally and in writing of the potential side effects of the use of glucocorticoid. The patient appeared to comprehend the informed consent and desired to have the procedure performed. Procedure: The patient was placed in the prone position on the fluoroscopy table and the back was prepped and draped in the usual sterile manner. The exact spinal level was  identified using fluoroscopy, and Lidocaine 1 % was injected locally, a # 22 gauge spinal needle was passed to the transverse process. The depth was noted and the needle redirected to pass inferior and approximately one cm anterior to the transverse process. YES  1 cc of Isovue M-200 was used to verify positioning in the epidural and paravertebral space and outlined the course of the spinal nerve into the epidural space. The same procedure was repeated at each spinal level indicated above. A total of 30 mg of preservative free Dexamethasone and 4 cc of Lidocaine was slowly injected. The patient tolerated the procedure well. The injection area was cleaned and bandaids applied.   Not excessive bleeding was noted. Patient dressed and discharged to home with instructions. Discussion: The patient tolerated the procedure well.                                               Anna Gross MD  May 28, 2020

## 2020-05-29 ENCOUNTER — OFFICE VISIT (OUTPATIENT)
Dept: ORTHOPEDIC SURGERY | Facility: CLINIC | Age: 75
End: 2020-05-29

## 2020-05-29 VITALS
SYSTOLIC BLOOD PRESSURE: 141 MMHG | BODY MASS INDEX: 28.14 KG/M2 | TEMPERATURE: 97.4 F | HEIGHT: 63 IN | DIASTOLIC BLOOD PRESSURE: 63 MMHG | WEIGHT: 158.8 LBS | HEART RATE: 106 BPM

## 2020-05-29 DIAGNOSIS — G89.29 CHRONIC LEFT SHOULDER PAIN: ICD-10-CM

## 2020-05-29 DIAGNOSIS — G89.29 CHRONIC RIGHT SHOULDER PAIN: ICD-10-CM

## 2020-05-29 DIAGNOSIS — M25.511 CHRONIC RIGHT SHOULDER PAIN: ICD-10-CM

## 2020-05-29 DIAGNOSIS — M19.011 PRIMARY OSTEOARTHRITIS, RIGHT SHOULDER: Primary | ICD-10-CM

## 2020-05-29 DIAGNOSIS — M25.512 CHRONIC LEFT SHOULDER PAIN: ICD-10-CM

## 2020-05-29 DIAGNOSIS — M19.012 PRIMARY OSTEOARTHRITIS, LEFT SHOULDER: ICD-10-CM

## 2020-05-29 RX ORDER — BETAMETHASONE SODIUM PHOSPHATE AND BETAMETHASONE ACETATE 3; 3 MG/ML; MG/ML
6 INJECTION, SUSPENSION INTRA-ARTICULAR; INTRALESIONAL; INTRAMUSCULAR; SOFT TISSUE ONCE
Qty: 0.5 ML | Refills: 0
Start: 2020-05-29 | End: 2020-05-29

## 2020-05-29 NOTE — PROGRESS NOTES
Patient: Krista Ortiz                MRN: [id number]       SSN: [id number]  YOB: 1945        AGE: 66 y.o. SEX: female    PCP: Kenisha Serrano MD  05/29/20    Chief Complaint   Patient presents with    Shoulder Pain     R     HISTORY:  Krista Ortiz is a 66 y.o. female who is seen for right shoulder pain. She has been experiencing shoulder pain for the past several years R > L. She feels shoulder pain with overhead activities and at night. She feels pain while carrying groceries and lifting heavy things. She has difficulty sleeping and laying on her sides. She feels pain even while peeling potatoes. She has seen Dr. Britany Duggan in the past for shoulder pain. Dr. Britany Duggan provided shoulder cortisone injections which helped. She goes to PT for her spine and her left leg. She sees Dr. Michael Chairez at the spine center. Pain Assessment  5/29/2020   Location of Pain Shoulder   Location Modifiers Right   Severity of Pain 9   Quality of Pain Throbbing; Sharp;Dull;Aching; Other (Comment)   Duration of Pain Other (Comment)   Frequency of Pain Several times daily   Aggravating Factors Bending;Stretching;Straightening; Other (Comment)   Aggravating Factors Comment -   Limiting Behavior Yes   Relieving Factors Rest   Result of Injury -   Type of Injury -   Type of Injury Comment -     Occupation, etc:  Ms. Keyon Sarmiento is not currently employed. 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 She lives in [place] [address] with her daughter. She does not have any grandchildren. She has 2 mini Schnauzers named Simone and Jose C everett. Jose C everett is Simone's father. She is not diabetic. Ms. Keyon Sarmiento weighs 158 lbs and is 5'3\" tall.        No results found for: HBA1C, HGBE8, SER1BWCQ, GFK9LJJU, BQL6BKPM  Weight Metrics 5/29/2020 2/14/2017 2/7/2017 10/25/2016 7/7/2016 6/24/2016 3/22/2016   Weight 158 lb 12.8 oz 150 lb 160 lb 163 lb 160 lb 159 lb 150 lb   BMI 28.13 kg/m2 26.57 kg/m2 28.34 kg/m2 28.87 kg/m2 28.35 kg/m2 28.17 kg/m2 26.58 kg/m2       Patient Active Problem List   Diagnosis Code    Anxiety F41.9    Asthma J45.909    Osteoporosis M81.0    Allergic rhinitis J30.9    COPD (chronic obstructive pulmonary disease) ([facility]) J44.9    Examination of eyes and vision Z01.00    Bilateral shoulder pain M25.511, M25.512     REVIEW OF SYSTEMS:    Constitutional Symptoms: Negative   Eyes: Negative   Ears, Nose, Throat and Mouth: Negative   Cardiovascular: Negative   Respiratory: Negative   Genitourinary: Per HPI   Gastrointestinal: Per HPI   Integumentary (Skin and/or Breast): Negative   Musculoskeletal: Per HPI   Endocrine/Rheumatologic: Negative   Neurological: Per HPI   Hematology/Lymphatic: Negative    Allergic/Immunologic: Negative   Phychiatric: Negative    Social History     Socioeconomic History    Marital status:      Spouse name: Not on file    Number of children: Not on file    Years of education: Not on file    Highest education level: Not on file   Occupational History    Not on file   Social Needs    Financial resource strain: Not on file    Food insecurity     Worry: Not on file     Inability: Not on file    Transportation needs     Medical: Not on file     Non-medical: Not on file   Tobacco Use    Smoking status: Former Smoker     Packs/day: 2.50     Years: 45.00     Pack years: 112.50     Types: Cigarettes     Last attempt to quit: 2006     Years since quittin.4    Smokeless tobacco: Never Used    Tobacco comment: smoked for 50 years   Substance and Sexual Activity    Alcohol use:  Yes     Alcohol/week: 2.0 - 3.0 standard drinks     Types: 2 - 3 Standard drinks or equivalent per week     Comment: occ    Drug use: No    Sexual activity: Not Currently   Lifestyle    Physical activity     Days per week: Not on file     Minutes per session: Not on file    Stress: Not on file   Relationships    Social connections     Talks on phone: Not on file     Gets together: Not on file     Attends Zoroastrian service: Not on file     Active member of club or organization: Not on file     Attends meetings of clubs or organizations: Not on file     Relationship status: Not on file    Intimate partner violence     Fear of current or ex partner: Not on file     Emotionally abused: Not on file     Physically abused: Not on file     Forced sexual activity: Not on file   Other Topics Concern    Not on file   Social History Narrative    Not on file      Allergies   Allergen Reactions    Qvar [Beclomethasone Dipropionate] Shortness of Breath    Entex [Phenylephrine-Guaifenesin] Other (comments)     intolerance    Sulfa (Sulfonamide Antibiotics) Other (comments)      Current Outpatient Medications   Medication Sig    betamethasone (Celestone Soluspan) 6 mg/mL injection 1 mL by Intra artICUlar route once for 1 dose.  gabapentin (Neurontin) 800 mg tablet Take 1 Tab by mouth three (3) times daily. Max Daily Amount: 2,400 mg.    meloxicam (MOBIC) 15 mg tablet     DULoxetine (CYMBALTA) 20 mg capsule Take 90 mg by mouth daily.  pravastatin (PRAVACHOL) 20 mg tablet     cyclobenzaprine (FLEXERIL) 5 mg tablet Take 5 mg by mouth three (3) times daily as needed for Muscle Spasm(s).  lisinopril (PRINIVIL, ZESTRIL) 10 mg tablet TAKE 1 TABLET BY MOUTH DAILY    sertraline (ZOLOFT) 100 mg tablet TAKE 1 TABLET BY MOUTH EVERY DAY    albuterol (VENTOLIN HFA) 90 mcg/actuation inhaler Take 2 Puffs by inhalation every four (4) hours as needed for Wheezing.  diazepam (VALIUM) 5 mg tablet Take 1 Tab by mouth two (2) times daily as needed for Anxiety.  pregabalin (LYRICA) 50 mg capsule Take 1 Cap by mouth two (2) times a day. Max Daily Amount: 100 mg.  Anoro Ellipta 62.5-25 mcg/actuation inhaler     gabapentin (NEURONTIN) 100 mg capsule 600 mg. No current facility-administered medications for this visit.        PHYSICAL EXAMINATION:  Visit Vitals  /63   Pulse (!) 106   Temp 97.4 °F (36.3 °C)   Ht 5' 3\" (1.6 m)   Wt 158 lb 12.8 oz (72 kg)   LMP 01/01/1974   BMI 28.13 kg/m²      ORTHO EXAMINATION:  Examination Right shoulder Left shoulder   Skin Intact Intact   Effusion - -   Biceps deformity - -   Atrophy - -   AC joint tenderness - -   Acromial tenderness + +   Biceps tenderness - -   Forward flexion/Elevation  160   Active abduction  160   External rotation ROM 5 5   Internal rotation ROM 65 65   Apprehension - -   Impingement - -   Drop Arm Test - -   Neurovascular Intact Intact       TIME OUT:  Chart reviewed for the following:   I, Eloisa Rincon MD, have reviewed the History, Physical and updated the Allergic reactions for [address] performed immediately prior to start of procedure:  Helene Villagran MD, have performed the following reviews on Concetta Mehta prior to the start of the procedure:          * Patient was identified by name and date of birth   * Agreement on procedure being performed was verified  * Risks and Benefits explained to the patient  * Procedure site verified and marked as necessary  * Patient was positioned for comfort  * Consent was obtained     Time: 11:16 AM     Date of procedure: 5/29/2020  Procedure performed by:  Eloisa Rincon MD  Ms. Enamorado tolerated the procedure well with no complications. RADIOGRAPHS:  XR RIGHT SHOULDER 5/29/20 BRAD  IMPRESSION:  Three views - No fractures, no acromioclavicular narrowing, severe glenohumeral narrowing, + calcific densities, goat beard deformity    IMPRESSION:      ICD-10-CM ICD-9-CM    1. Primary osteoarthritis, right shoulder M19.011 715.11 betamethasone (Celestone Soluspan) 6 mg/mL injection      BETAMETHASONE ACETATE & SODIUM PHOSPHATE INJECTION 3 MG EA.      DRAIN/INJECT LARGE JOINT/BURSA      REFERRAL TO PHYSICAL THERAPY   2.  Chronic right shoulder pain M25.511 719.41 AMB POC XRAY, SHOULDER; COMPLETE, 2+    G89.29 338.29 betamethasone (Celestone Soluspan) 6 mg/mL injection BETAMETHASONE ACETATE & SODIUM PHOSPHATE INJECTION 3 MG EA.      DRAIN/INJECT LARGE JOINT/BURSA      REFERRAL TO PHYSICAL THERAPY   3. Chronic left shoulder pain M25.512 719.41 betamethasone (Celestone Soluspan) 6 mg/mL injection    G89.29 338.29 BETAMETHASONE ACETATE & SODIUM PHOSPHATE INJECTION 3 MG EA.      DRAIN/INJECT LARGE JOINT/BURSA      REFERRAL TO PHYSICAL THERAPY   4. Primary osteoarthritis, left shoulder M19.012 715.11 betamethasone (Celestone Soluspan) 6 mg/mL injection      BETAMETHASONE ACETATE & SODIUM PHOSPHATE INJECTION 3 MG EA.      DRAIN/INJECT LARGE JOINT/BURSA      REFERRAL TO PHYSICAL THERAPY     PLAN:  After discussing treatment options, patient's right shoulder was injected with 4 cc Marcaine and 1/2 cc Celestone. She will start a brief course of outpatient physical therapy. She will follow up as needed.       Scribed by Pawel Terrell ([address]) as dictated by Jorge Mckeon MD

## 2020-06-26 DIAGNOSIS — G60.9 IDIOPATHIC PERIPHERAL NEUROPATHY: ICD-10-CM

## 2020-06-26 DIAGNOSIS — M43.10 SPONDYLOLISTHESIS, ACQUIRED: ICD-10-CM

## 2020-06-26 DIAGNOSIS — M47.817 LUMBOSACRAL SPONDYLOSIS WITHOUT MYELOPATHY: ICD-10-CM

## 2020-06-26 DIAGNOSIS — M51.36 DDD (DEGENERATIVE DISC DISEASE), LUMBAR: ICD-10-CM

## 2020-06-26 DIAGNOSIS — M48.061 SPINAL STENOSIS OF LUMBAR REGION, UNSPECIFIED WHETHER NEUROGENIC CLAUDICATION PRESENT: ICD-10-CM

## 2020-06-26 DIAGNOSIS — M54.16 LUMBAR NEURITIS: ICD-10-CM

## 2020-06-29 DIAGNOSIS — M48.061 SPINAL STENOSIS OF LUMBAR REGION, UNSPECIFIED WHETHER NEUROGENIC CLAUDICATION PRESENT: ICD-10-CM

## 2020-06-29 DIAGNOSIS — M47.817 LUMBOSACRAL SPONDYLOSIS WITHOUT MYELOPATHY: ICD-10-CM

## 2020-06-29 DIAGNOSIS — M51.36 DDD (DEGENERATIVE DISC DISEASE), LUMBAR: ICD-10-CM

## 2020-06-29 DIAGNOSIS — M43.10 SPONDYLOLISTHESIS, ACQUIRED: ICD-10-CM

## 2020-06-29 DIAGNOSIS — M54.16 LUMBAR NEURITIS: ICD-10-CM

## 2020-06-29 DIAGNOSIS — G60.9 IDIOPATHIC PERIPHERAL NEUROPATHY: ICD-10-CM

## 2020-06-29 RX ORDER — GABAPENTIN 800 MG/1
TABLET ORAL
Qty: 90 TAB | Refills: 0 | OUTPATIENT
Start: 2020-06-29

## 2020-06-29 RX ORDER — GABAPENTIN 800 MG/1
800 TABLET ORAL 3 TIMES DAILY
Qty: 90 TAB | Refills: 0 | Status: SHIPPED | OUTPATIENT
Start: 2020-06-29 | End: 2020-07-28 | Stop reason: SDUPTHER

## 2020-06-29 NOTE — TELEPHONE ENCOUNTER
Patient's daughter, Sandie Hyman, called about refill denial - patient is completely out of medicine \"and not doing well\". Please contact Sandie Hyman to further advise, [phone].

## 2020-06-29 NOTE — TELEPHONE ENCOUNTER
Per daughter, they were told for patient to stay on Neurontin because insurance would not cover the Lyrica. Now that she has run completely out she is having withdrawal symptoms. Please advise as to what else they should/could do. Of note, she is requesting this message go through the provider.       Jose Carlos Vickers [phone]

## 2020-06-29 NOTE — TELEPHONE ENCOUNTER
Per Dr. Aquilino Pham note  \"I will wean her off Neurontin 800 mg TID secondary to no relief. I will try her on Lyrica 50 mg BID. \"

## 2020-06-30 NOTE — TELEPHONE ENCOUNTER
Spoke to the patients daughter and informed her that the medication has been sent to the pharmacy. She states that the patient was only without the medication for 1 day and that she gave her some of her Neurontin to get her through. I did inform her that since the Lyrica was denied they could always pay out of pocket. She stated that is was expensive. I educated her on Goodrx. She stated that she did not know about that and they are willing to pay that amount. Ms. Ilsa Aase will continue Neurontin until her follow up and will discuss the change to Lyrica at that visit, per patients daughter.

## 2020-07-09 ENCOUNTER — OFFICE VISIT (OUTPATIENT)
Dept: ORTHOPEDIC SURGERY | Facility: CLINIC | Age: 75
End: 2020-07-09

## 2020-07-09 VITALS
DIASTOLIC BLOOD PRESSURE: 67 MMHG | TEMPERATURE: 97.4 F | HEART RATE: 91 BPM | SYSTOLIC BLOOD PRESSURE: 132 MMHG | HEIGHT: 63 IN | WEIGHT: 159.6 LBS | BODY MASS INDEX: 28.28 KG/M2

## 2020-07-09 DIAGNOSIS — G89.29 CHRONIC RIGHT SHOULDER PAIN: ICD-10-CM

## 2020-07-09 DIAGNOSIS — M19.011 PRIMARY OSTEOARTHRITIS, RIGHT SHOULDER: Primary | ICD-10-CM

## 2020-07-09 DIAGNOSIS — M25.511 CHRONIC RIGHT SHOULDER PAIN: ICD-10-CM

## 2020-07-09 RX ORDER — BETAMETHASONE SODIUM PHOSPHATE AND BETAMETHASONE ACETATE 3; 3 MG/ML; MG/ML
6 INJECTION, SUSPENSION INTRA-ARTICULAR; INTRALESIONAL; INTRAMUSCULAR; SOFT TISSUE ONCE
Qty: 0.5 ML | Refills: 0
Start: 2020-07-09 | End: 2020-07-09

## 2020-07-09 NOTE — PROGRESS NOTES
Patient: Richard Oneil                MRN: [id number]       SSN: [id number]  YOB: 1945        AGE: 66 y.o. SEX: female    PCP: Sudhir Ramos MD  07/09/20    Chief Complaint   Patient presents with    Shoulder Pain     Rt     HISTORY:  Richard Oneil is a 66 y.o. female who is seen for right shoulder pain. She has been experiencing shoulder pain for the past several years R > L. She feels shoulder pain with overhead activities and at night. She feels pain while carrying groceries and lifting heavy things. She has difficulty sleeping and laying on her sides. She feels pain even while peeling potatoes. She has seen Dr. Feliciano Teixeira in the past for shoulder pain. Dr. Feliciano Teixeira provided a right shoulder cortisone injection which helped. She states that she didn't return to see Dr. Feliciano Teixeira because of personality differences. She goes to PT for her spine and her left leg. She sees Dr. Leydi Caballero at the spine center. Pain Assessment  7/9/2020   Location of Pain Shoulder   Location Modifiers Right   Severity of Pain 6   Quality of Pain Sharp;Locking;Grinding; Popping;Cracking   Duration of Pain Other (Comment)   Duration of Pain Comment Dependent   Frequency of Pain Several times daily   Aggravating Factors Bending;Straightening;Stretching; Other (Comment)   Aggravating Factors Comment -   Limiting Behavior Some   Relieving Factors Other (Comment)   Result of Injury -   Type of Injury -   Type of Injury Comment -     Occupation, etc:  Ms. Kenya Arriaga is not currently employed. 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 She lives in [place] with her daughter. She states her daughter is good at being bossy. She does not have any grandchildren. She has 2 mini Schnauzers named Simone and Dhiraj Yanez. Dhiraj Yanez is Simone's father. She is not diabetic. Ms. Kenya Arriaga weighs 159 lbs and is 5'3\" tall.        No results found for: HBA1C, HGBE8, YXV8ADTK, MEP5BLPV, PZV2PLBY  Weight Metrics 2020 2020 2017 2017 10/25/2016 2016 2016   Weight 159 lb 9.6 oz 158 lb 12.8 oz 150 lb 160 lb 163 lb 160 lb 159 lb   BMI 28.27 kg/m2 28.13 kg/m2 26.57 kg/m2 28.34 kg/m2 28.87 kg/m2 28.35 kg/m2 28.17 kg/m2       Patient Active Problem List   Diagnosis Code    Anxiety F41.9    Asthma J45.909    Osteoporosis M81.0    Allergic rhinitis J30.9    COPD (chronic obstructive pulmonary disease) ([facility]) J44.9    Examination of eyes and vision Z01.00    Bilateral shoulder pain M25.511, M25.512     REVIEW OF SYSTEMS:    Constitutional Symptoms: Negative   Eyes: Negative   Ears, Nose, Throat and Mouth: Negative   Cardiovascular: Negative   Respiratory: Negative   Genitourinary: Per HPI   Gastrointestinal: Per HPI   Integumentary (Skin and/or Breast): Negative   Musculoskeletal: Per HPI   Endocrine/Rheumatologic: Negative   Neurological: Per HPI   Hematology/Lymphatic: Negative    Allergic/Immunologic: Negative   Phychiatric: Negative    Social History     Socioeconomic History    Marital status:      Spouse name: Not on file    Number of children: Not on file    Years of education: Not on file    Highest education level: Not on file   Occupational History    Not on file   Social Needs    Financial resource strain: Not on file    Food insecurity     Worry: Not on file     Inability: Not on file   [place] needs     Medical: Not on file     Non-medical: Not on file   Tobacco Use    Smoking status: Former Smoker     Packs/day: 2.50     Years: 45.00     Pack years: 112.50     Types: Cigarettes     Last attempt to quit: 2006     Years since quittin.5    Smokeless tobacco: Never Used    Tobacco comment: smoked for 50 years   Substance and Sexual Activity    Alcohol use:  Yes     Alcohol/week: 2.0 - 3.0 standard drinks     Types: 2 - 3 Standard drinks or equivalent per week     Comment: occ    Drug use: No    Sexual activity: Not Currently Lifestyle    Physical activity     Days per week: Not on file     Minutes per session: Not on file    Stress: Not on file   Relationships    Social connections     Talks on phone: Not on file     Gets together: Not on file     Attends Restoration service: Not on file     Active member of club or organization: Not on file     Attends meetings of clubs or organizations: Not on file     Relationship status: Not on file    Intimate partner violence     Fear of current or ex partner: Not on file     Emotionally abused: Not on file     Physically abused: Not on file     Forced sexual activity: Not on file   Other Topics Concern    Not on file   Social History Narrative    Not on file      Allergies   Allergen Reactions    Qvar [Beclomethasone Dipropionate] Shortness of Breath    Entex [Phenylephrine-Guaifenesin] Other (comments)     intolerance    Sulfa (Sulfonamide Antibiotics) Other (comments)      Current Outpatient Medications   Medication Sig    gabapentin (Neurontin) 800 mg tablet Take 1 Tab by mouth three (3) times daily. Max Daily Amount: 2,400 mg. Indications: neuropathic pain    meloxicam (MOBIC) 15 mg tablet     Anoro Ellipta 62.5-25 mcg/actuation inhaler     DULoxetine (CYMBALTA) 20 mg capsule Take 90 mg by mouth daily.  cyclobenzaprine (FLEXERIL) 5 mg tablet Take 5 mg by mouth three (3) times daily as needed for Muscle Spasm(s).  lisinopril (PRINIVIL, ZESTRIL) 10 mg tablet TAKE 1 TABLET BY MOUTH DAILY    sertraline (ZOLOFT) 100 mg tablet TAKE 1 TABLET BY MOUTH EVERY DAY    albuterol (VENTOLIN HFA) 90 mcg/actuation inhaler Take 2 Puffs by inhalation every four (4) hours as needed for Wheezing.  diazepam (VALIUM) 5 mg tablet Take 1 Tab by mouth two (2) times daily as needed for Anxiety.  pregabalin (LYRICA) 50 mg capsule Take 1 Cap by mouth two (2) times a day. Max Daily Amount: 100 mg.  pravastatin (PRAVACHOL) 20 mg tablet     gabapentin (NEURONTIN) 100 mg capsule 600 mg.      No current facility-administered medications for this visit. PHYSICAL EXAMINATION:  Visit Vitals  /67   Pulse 91   Temp 97.4 °F (36.3 °C)   Ht 5' 3\" (1.6 m)   Wt 159 lb 9.6 oz (72.4 kg)   LMP 01/01/1974   BMI 28.27 kg/m²      ORTHO EXAMINATION:  Examination Right shoulder Left shoulder   Skin Intact Intact   Effusion + -   Biceps deformity - -   Atrophy - -   AC joint tenderness - -   Acromial tenderness + +   Biceps tenderness - -   Forward flexion/Elevation  160   Active abduction  160   External rotation ROM 5 10   Internal rotation ROM 65 75   Apprehension - -   Impingement - -   Drop Arm Test - -   Neurovascular Intact Intact       TIME OUT:  Chart reviewed for the following:   I, Sonia Adams MD, have reviewed the History, Physical and updated the Allergic reactions for Sunshine Enamorado   TIME OUT performed immediately prior to start of procedure:  Tati Álvarez MD, have performed the following reviews on Kevin Hager prior to the start of the procedure:          * Patient was identified by name and date of birth   * Agreement on procedure being performed was verified  * Risks and Benefits explained to the patient  * Procedure site verified and marked as necessary  * Patient was positioned for comfort  * Consent was obtained     Time: 3:32 PM    Date of procedure: 7/9/2020  Procedure performed by:  Sonia Adams MD  Ms. Enamorado tolerated the procedure well with no complications. RADIOGRAPHS:  XR RIGHT SHOULDER 5/29/20 BRAD  IMPRESSION:  Three views - No fractures, no acromioclavicular narrowing, severe glenohumeral narrowing, + calcific densities, goat beard deformity    IMPRESSION:      ICD-10-CM ICD-9-CM    1. Primary osteoarthritis, right shoulder  M19.011 715.11 betamethasone (Celestone Soluspan) 6 mg/mL injection      BETAMETHASONE ACETATE & SODIUM PHOSPHATE INJECTION 3 MG EA.      DRAIN/INJECT LARGE JOINT/BURSA   2.  Chronic right shoulder pain  M25.511 719.41 betamethasone (Celestone Soluspan) 6 mg/mL injection    G89.29 338.29 BETAMETHASONE ACETATE & SODIUM PHOSPHATE INJECTION 3 MG EA.      DRAIN/INJECT LARGE JOINT/BURSA     PLAN:  After discussing treatment options, patient's right shoulder was injected with 4 cc Marcaine and 1/2 cc Celestone. There is no need for surgery at this time. She will follow up as needed.       Scribed by Britta Ryan ([facility]) as dictated by Jordan Oconnell MD

## 2020-07-27 DIAGNOSIS — M54.16 LUMBAR NEURITIS: ICD-10-CM

## 2020-07-27 DIAGNOSIS — M43.10 SPONDYLOLISTHESIS, ACQUIRED: ICD-10-CM

## 2020-07-27 DIAGNOSIS — M48.061 SPINAL STENOSIS OF LUMBAR REGION, UNSPECIFIED WHETHER NEUROGENIC CLAUDICATION PRESENT: ICD-10-CM

## 2020-07-27 DIAGNOSIS — G60.9 IDIOPATHIC PERIPHERAL NEUROPATHY: ICD-10-CM

## 2020-07-27 DIAGNOSIS — M51.36 DDD (DEGENERATIVE DISC DISEASE), LUMBAR: ICD-10-CM

## 2020-07-27 DIAGNOSIS — M47.817 LUMBOSACRAL SPONDYLOSIS WITHOUT MYELOPATHY: ICD-10-CM

## 2020-07-27 RX ORDER — GABAPENTIN 800 MG/1
800 TABLET ORAL 3 TIMES DAILY
Qty: 90 TAB | Refills: 0 | OUTPATIENT
Start: 2020-07-27

## 2020-07-27 NOTE — TELEPHONE ENCOUNTER
Last Visit: 5/28/20 Block done with MD Chinyere Sarabia  Next Appointment: 8/27/20 with MD Chinyere Sarabia  Previous Refill Encounter(s): 6/29/20 #90    Requested Prescriptions     Pending Prescriptions Disp Refills    gabapentin (Neurontin) 800 mg tablet 90 Tab 0     Sig: Take 1 Tab by mouth three (3) times daily. Max Daily Amount: 2,400 mg.  Indications: neuropathic pain

## 2020-07-27 NOTE — TELEPHONE ENCOUNTER
Patients daughter reports patient is completely out of rx gabapentin (Neurontin) 800 mg tablet - please call refill in to [address] on file as soon as possible.

## 2020-07-28 RX ORDER — GABAPENTIN 800 MG/1
800 TABLET ORAL 3 TIMES DAILY
Qty: 90 TAB | Refills: 0 | Status: SHIPPED | OUTPATIENT
Start: 2020-07-28 | End: 2020-09-01 | Stop reason: ALTCHOICE

## 2020-07-28 NOTE — TELEPHONE ENCOUNTER
Received call from aKthy Foy to check on status of refill request. States that patient is completely out of medication.      Best contact number for [facility] Client [phone]   Best contact number for patient [phone]

## 2020-07-28 NOTE — TELEPHONE ENCOUNTER
I called and spoke to [facility]. She is on the pt's HIPAA form for all access. She was asked if the pt was still taking this medication because she was supposed to be weaning off and starting lyrica since she reported that the gabapentin was not helping her pain. She states that the pt's insurance would not cover the lyrica. Dr. Tonny Monahan was notified and instructed the pt to continue taking the gabapentin for now until her follow up in August. They will discuss other options at that time. Pt will need one more refill, appt scheduled for 08/27/2020.

## 2020-08-26 NOTE — PROGRESS NOTES
[facility]  [address], [place]   Phone: [phone]  Fax: [phone]        PROGRESS NOTE      HISTORY OF PRESENT ILLNESS:  The patient is a 66 y.o. female and was seen today for follow up of progressive, intermittent low back pain into the LLE radiating to the ankle x 8/2019 without trauma. Her pain is exacerbated with walking. Positive shopping cart sign. She has completed MDP without benefit. She is currently on Cymbalta 90 mg secondary to depression. Patient failed NEURONTIN 800 mg TID secondary to no relief. Pt denies change in bowel or bladder habits. Pt denies fever, weight loss, or skin changes. Pt denies any signs of weakness. Patient denies previous spinal surgery, injections, or physical therapy/chiropractic care. Pt denies h/o chemotherapy, gastric bypass, alcohol abuse, or DM. PmHx of depression. Note from Dr. Gerhard Rios patient was seen with c/o pain and burning in the left leg. Indicated patient was on Cymbalta 60 mg and Neurontin 300 mg BID at that time. Note from Dr. Josef Michael patient reports no change since last visit. Indicated she endorsed better benefit with the qhs dose of Neurontin. Indicated they increased her Neurontin to 600 mg TID and started her on a MDP at that time. L Spine MRI dated 2/10/2020 films reviewed. Per report, grade 1 anterolistheses at L4-5 and L5-S1. Mild spinal canal stenosis at L2-3. Moderate spinal canal stenoses from L3-4 to L5-S1. Mild foraminal stenoses at L3-4 and L4-5. Moderate to severe bilateral foraminal stenoses at L5-S1 with potential L5 nerve root compression. Complex left renal cyst with fluid-hemorrhage level. Other small simple renal cysts.  LLE EMG dated 1/17/2020 suggested: sensorimotor peripheral neuropathy and chronic L5 and S1 radiculopathy. At her last clinical appointment, she was not interested surgical intervention at the time.  I offered PT, pt declined. She elected to proceed with blocks. I ordered a left-sided L5 and S1 SNRB. Additionally, I weaned her off Neurontin 800 mg TID secondary to no relief. I tried her on Lyrica 50 mg BID.     The patient returns today and reports pain location and distribution remains unchanged. She rates her pain 1-6/10, previously 7/10. She continues to report limited standing and walking tolerance. Pt reports she did not take Lyrica as she was initially told insurance would not cover it. They then told her it would be covered. She admits to still taking the Neurontin 800 mg TID. Pt underwent left L5 and S1 SNRB on 5/28/2020 with good relief. Pt denies change in bowel or bladder habits. Note from Dr. Xiomara Arteaga dated 7/9/2020 indicating patient was seen with c/o right shoulder pain. Her pain is worse with overhead activity and at night. Indicated she has OA of the right shoulder.  reviewed. Body mass index is 28.8 kg/m².     PCP: Mayur Portillo MD      Past Medical History:   Diagnosis Date    Allergic rhinitis     on allergy shots    Anemia     Asthma     Borderline diabetic     Cataract     Chronic lung disease     Cigarette smoker     abuse quit 2006    COPD (chronic obstructive pulmonary disease) ([facility] [address]     Degenerative arthritis     both shoulders    Depression     Easy bruising     Hypertension     Nervousness     Osteoporosis 10/27/99        Social History     Socioeconomic History    Marital status:      Spouse name: Not on file    Number of children: Not on file    Years of education: Not on file    Highest education level: Not on file   Occupational History    Not on file   Social Needs    Financial resource strain: Not on file    Food insecurity     Worry: Not on file     Inability: Not on file   [employer] needs     Medical: Not on file     Non-medical: Not on file   Tobacco Use    Smoking status: Former Smoker     Packs/day: 2.50     Years: 45.00     Pack years: 112.50     Types: Cigarettes     Last attempt to quit: 2006     Years since quittin.6    Smokeless tobacco: Never Used    Tobacco comment: smoked for 50 years   Substance and Sexual Activity    Alcohol use: Yes     Alcohol/week: 2.0 - 3.0 standard drinks     Types: 2 - 3 Standard drinks or equivalent per week     Comment: occ    Drug use: No    Sexual activity: Not Currently   Lifestyle    Physical activity     Days per week: Not on file     Minutes per session: Not on file    Stress: Not on file   Relationships    Social connections     Talks on phone: Not on file     Gets together: Not on file     Attends Rastafari service: Not on file     Active member of club or organization: Not on file     Attends meetings of clubs or organizations: Not on file     Relationship status: Not on file    Intimate partner violence     Fear of current or ex partner: Not on file     Emotionally abused: Not on file     Physically abused: Not on file     Forced sexual activity: Not on file   Other Topics Concern    Not on file   Social History Narrative    Not on file       Current Outpatient Medications   Medication Sig Dispense Refill    gabapentin (Neurontin) 800 mg tablet Take 1 Tab by mouth three (3) times daily. Max Daily Amount: 2,400 mg. Indications: neuropathic pain 90 Tab 0    meloxicam (MOBIC) 15 mg tablet       Anoro Ellipta 62.5-25 mcg/actuation inhaler       DULoxetine (CYMBALTA) 20 mg capsule Take 90 mg by mouth daily.  cyclobenzaprine (FLEXERIL) 5 mg tablet Take 5 mg by mouth three (3) times daily as needed for Muscle Spasm(s).  lisinopril (PRINIVIL, ZESTRIL) 10 mg tablet TAKE 1 TABLET BY MOUTH DAILY 30 Tab 0    sertraline (ZOLOFT) 100 mg tablet TAKE 1 TABLET BY MOUTH EVERY DAY 30 Tab 6    albuterol (VENTOLIN HFA) 90 mcg/actuation inhaler Take 2 Puffs by inhalation every four (4) hours as needed for Wheezing.  1 Inhaler 2    pregabalin (LYRICA) 50 mg capsule Take 1 Cap by mouth two (2) times a day. Max Daily Amount: 100 mg. 60 Cap 1    pravastatin (PRAVACHOL) 20 mg tablet       gabapentin (NEURONTIN) 100 mg capsule 600 mg.      diazepam (VALIUM) 5 mg tablet Take 1 Tab by mouth two (2) times daily as needed for Anxiety. (Patient not taking: Reported on 8/27/2020) 60 Tab 2       Allergies   Allergen Reactions    Qvar [Beclomethasone Dipropionate] Shortness of Breath    Entex [Phenylephrine-Guaifenesin] Other (comments)     intolerance    Sulfa (Sulfonamide Antibiotics) Other (comments)          PHYSICAL EXAMINATION    Visit Vitals  /77 (BP 1 Location: Right arm, BP Patient Position: Sitting)   Pulse 94   Temp 97.6 °F (36.4 °C) (Temporal)   Wt 162 lb 9.6 oz (73.8 kg)   LMP 01/01/1974   SpO2 94%   BMI 28.80 kg/m²       CONSTITUTIONAL: NAD, A&O x 3  SENSATION: Intact to light touch throughout  RANGE OF MOTION: The patient has full passive range of motion in all four extremities. MOTOR:  Straight Leg Raise: Negative, bilateral                 Hip Flex Knee Ext Knee Flex Ankle DF GTE Ankle PF Tone   Right +4/5 +4/5 +4/5 +4/5 +4/5 +4/5 +4/5   Left +4/5 +4/5 +4/5 +4/5 +4/5 +4/5 +4/5       ASSESSMENT   Diagnoses and all orders for this visit:    1. Lumbosacral spondylosis without myelopathy  -     pregabalin (LYRICA) 50 mg capsule; Take 1 Cap by mouth two (2) times a day. Max Daily Amount: 100 mg.    2. Spinal stenosis of lumbar region, unspecified whether neurogenic claudication present  -     pregabalin (LYRICA) 50 mg capsule; Take 1 Cap by mouth two (2) times a day. Max Daily Amount: 100 mg.    3. Lumbar neuritis  -     pregabalin (LYRICA) 50 mg capsule; Take 1 Cap by mouth two (2) times a day. Max Daily Amount: 100 mg.    4. DDD (degenerative disc disease), lumbar  -     pregabalin (LYRICA) 50 mg capsule; Take 1 Cap by mouth two (2) times a day. Max Daily Amount: 100 mg.    5. Idiopathic peripheral neuropathy  -     pregabalin (LYRICA) 50 mg capsule;  Take 1 Cap by mouth two (2) times a day. Max Daily Amount: 100 mg.    6. Spondylolisthesis, acquired  -     pregabalin (LYRICA) 50 mg capsule; Take 1 Cap by mouth two (2) times a day. Max Daily Amount: 100 mg. IMPRESSION AND PLAN:  Patient returns to the office today with c/o low back pain into the LLE radiating to the ankle. Multiple treatment options were discussed. She is not interested in another block at this time. I offered PT, pt deferred due to transportation complications at this time. I will wean her off of Neurontin 800 mg TID. I will try her on Lyrica 50 mg BID. The risks, benefits, and potential side effects of this medication were discussed. Patient understands and wishes to proceed. Patient advised to call the office if intolerant to new medication. Patient is neurologically intact. I will see the patient back in 1 month's time or earlier if needed. Written by Larisa Mckay, as dictated by Alfreda Villagran MD  I examined the patient, reviewed and agree with the note.

## 2020-08-27 ENCOUNTER — OFFICE VISIT (OUTPATIENT)
Dept: ORTHOPEDIC SURGERY | Age: 75
End: 2020-08-27

## 2020-08-27 VITALS
OXYGEN SATURATION: 94 % | TEMPERATURE: 97.6 F | BODY MASS INDEX: 28.8 KG/M2 | WEIGHT: 162.6 LBS | HEART RATE: 94 BPM | DIASTOLIC BLOOD PRESSURE: 77 MMHG | SYSTOLIC BLOOD PRESSURE: 137 MMHG

## 2020-08-27 DIAGNOSIS — M48.061 SPINAL STENOSIS OF LUMBAR REGION, UNSPECIFIED WHETHER NEUROGENIC CLAUDICATION PRESENT: ICD-10-CM

## 2020-08-27 DIAGNOSIS — M51.36 DDD (DEGENERATIVE DISC DISEASE), LUMBAR: ICD-10-CM

## 2020-08-27 DIAGNOSIS — M54.16 LUMBAR NEURITIS: ICD-10-CM

## 2020-08-27 DIAGNOSIS — M43.10 SPONDYLOLISTHESIS, ACQUIRED: ICD-10-CM

## 2020-08-27 DIAGNOSIS — M47.817 LUMBOSACRAL SPONDYLOSIS WITHOUT MYELOPATHY: Primary | ICD-10-CM

## 2020-08-27 DIAGNOSIS — G60.9 IDIOPATHIC PERIPHERAL NEUROPATHY: ICD-10-CM

## 2020-08-27 RX ORDER — PREGABALIN 50 MG/1
50 CAPSULE ORAL 2 TIMES DAILY
Qty: 60 CAP | Refills: 1 | Status: SHIPPED | OUTPATIENT
Start: 2020-08-27 | End: 2022-05-27

## 2020-08-27 NOTE — LETTER
8/27/20 Patient: Peace Dominguez YOB: 1945 Date of Visit: 8/27/2020 Snehal Rader MD 
[facility] [address] [address] VIA Facsimile: [phone] Dear Snehal Rader MD, Thank you for referring Ms. Laury Hilliard to [address] for evaluation. My notes for this consultation are attached. If you have questions, please do not hesitate to call me. I look forward to following your patient along with you. Sincerely, Chica García MD

## 2020-08-31 ENCOUNTER — TELEPHONE (OUTPATIENT)
Dept: ORTHOPEDIC SURGERY | Age: 75
End: 2020-08-31

## 2020-08-31 DIAGNOSIS — M47.817 LUMBOSACRAL SPONDYLOSIS WITHOUT MYELOPATHY: Primary | ICD-10-CM

## 2020-08-31 DIAGNOSIS — M48.061 SPINAL STENOSIS OF LUMBAR REGION, UNSPECIFIED WHETHER NEUROGENIC CLAUDICATION PRESENT: ICD-10-CM

## 2020-08-31 DIAGNOSIS — M51.36 DDD (DEGENERATIVE DISC DISEASE), LUMBAR: ICD-10-CM

## 2020-08-31 DIAGNOSIS — M54.16 LUMBAR NEURITIS: ICD-10-CM

## 2020-08-31 NOTE — TELEPHONE ENCOUNTER
Looks like pt was on a high dose of Gabapentin w/ no relief of pain, I would recommend coming off it. What dose is she down to taking right now? Did she ever get Lyrica filled?

## 2020-08-31 NOTE — TELEPHONE ENCOUNTER
Pt daughter Iris Smith (ok hipaa) called requesting refill for pt gabapentin states she supposed to be weaning off the gabapentin and to  but comments the patient really doesn't want to Pt has 2 pills left of the gabapentin Pharmacy: 
[address] Pt daughter on hipaa P#[phone]

## 2020-08-31 NOTE — TELEPHONE ENCOUNTER
PA is required for Pregabalin 50mg    Cover My Meds Key:  Aicha Guzmán Del Valle: [id number] - Rx #: [id number]

## 2020-09-01 RX ORDER — GABAPENTIN 400 MG/1
CAPSULE ORAL
Qty: 42 CAP | Refills: 0 | Status: SHIPPED | OUTPATIENT
Start: 2020-09-01

## 2020-09-01 NOTE — TELEPHONE ENCOUNTER
I have sent in neurontin 400 mg with weaning instructions on the bottle:  1 cap tid x 1 week, then 1 cap bid x 1 week and then 1 cap every day and then stop.

## 2020-09-04 PROBLEM — J98.11 ATELECTASIS: Status: ACTIVE | Noted: 2020-06-18

## 2020-09-04 PROBLEM — R06.02 SOB (SHORTNESS OF BREATH): Status: ACTIVE | Noted: 2020-06-18

## 2020-09-10 ENCOUNTER — OFFICE VISIT (OUTPATIENT)
Dept: PULMONOLOGY | Age: 75
End: 2020-09-10

## 2020-09-10 VITALS
TEMPERATURE: 98.3 F | HEART RATE: 94 BPM | WEIGHT: 160 LBS | HEIGHT: 63 IN | OXYGEN SATURATION: 96 % | DIASTOLIC BLOOD PRESSURE: 80 MMHG | RESPIRATION RATE: 20 BRPM | SYSTOLIC BLOOD PRESSURE: 120 MMHG | BODY MASS INDEX: 28.35 KG/M2

## 2020-09-10 DIAGNOSIS — Z87.891 PERSONAL HISTORY OF SMOKING: ICD-10-CM

## 2020-09-10 DIAGNOSIS — J44.9 STAGE 3 SEVERE COPD BY GOLD CLASSIFICATION (HCC): Primary | ICD-10-CM

## 2020-09-10 DIAGNOSIS — R06.02 SOB (SHORTNESS OF BREATH): ICD-10-CM

## 2020-09-10 DIAGNOSIS — R13.14 PHARYNGOESOPHAGEAL DYSPHAGIA: ICD-10-CM

## 2020-09-10 RX ORDER — ARFORMOTEROL TARTRATE 15 UG/2ML
15 SOLUTION RESPIRATORY (INHALATION) 2 TIMES DAILY
Qty: 60 VIAL | Refills: 3 | Status: SHIPPED | OUTPATIENT
Start: 2020-09-10 | End: 2020-09-11 | Stop reason: SDUPTHER

## 2020-09-10 RX ORDER — ALBUTEROL SULFATE 2.5 MG/.5ML
2.5 SOLUTION RESPIRATORY (INHALATION)
COMMUNITY

## 2020-09-10 NOTE — PROGRESS NOTES
Last seen 6/24/2016 by PAPO    The pt. C/o SOB with exertion. Occ. Cough and wheezing. She doesn't feel the [place] MDI is working as well as it did. She makes comment about the \"nasty\" taste. She is instructed  To swish and gargle expectorating into the sink following use. She also mentions difficulty swallowing some small items such as corn and crackers. She denies ED nor Hospital stays this year.

## 2020-09-10 NOTE — PROGRESS NOTES
[facility]  Pulmonary, Critical Care, and Sleep Medicine      Pulmonary Office Initial Consultation    Name: Scott Barnes     : 1945     Date: 9/10/2020        Subjective:   Patient has been referred for evaluation of: COPD. Patient is a 66 y.o. female who is accompanied by her daughter and gives a history of being diagnosed with COPD around . Patient has been followed at [facility] pulmonology-Dr. Marizol Tran who recently left the area and therefore is seeking a new pulmonologist.  [place] a history of having 3 pack a day smoking until about 14 years back when she quit. She has symptoms of shortness of breath on minimal exertion on a daily basis. She mentions getting hot sweaty with any physical activity and is not able to lift any objects, bend stoop without getting out of breath. She paces her activities of daily living. Has not climbed a stair in long time as far she can remember. She has been on Anoro and recently was prescribed-nebulized Meli Kluver but her insurance did not cover and therefore she is continued on the Anoro which she thinks is not helping and Dr. Marizol Tran felt that patient's inhalation techniques were part of the issue. She denies any orthopnea or paroxysmal nocturnal dyspnea  Denies any pedal edema  Patient mentions having some cough-not very bothersome. Her daughter although contradicts and states that she has a daily cough and she gets easily choked on food. Patient states that usually if she eats crackers, kernels of corn rice she feels getting choked in her lower throat and starts coughing. She is also noticed this with several liquids.   Has intermittent wheezing  Denies chest pain  She has not been on any home oxygen  Her pulmonary function tests were done about a year ago at [address] [place] [address]  She does not recall ever having a CT scan of her chest  Patient states that she is not able to tolerate inhaled corticosteroids  She has completed pulmonary rehab in 2016.     1 2 3 4 5   Cough  2      Mucus 1       Chest tightness 1       ADL's  2      Climb 1 flight stairs     5   Sleep 1       Energy level  2        Scale 1   2  3  4  5  Never to all the time    Or CAT> 10     Patient is a retired   Occupational exposure-none to any inhalational dust fumes or chemicals  Environmental exposures-has 2 dogs at home.   They do not shed  ILD history:  No Hx of connective tissue disease such as RA, Lupus, Scleroderma  Now Hx Raynauds  No Hx sarcoidosis  No Hx taking medications- methotrexate, Amiodarone, Nitrofurantoin  No Hx cancer, chemotherapy, radiation  No Hx Birds, chickens, farm animals    Review of data:  Testing:  CXR  CT zohz-[id number]-sy  PFT-likely 2019  Echo-now  6 min walk-[place]    Vaccinations:  Pneumococcal  Influenza    DME:  Oxygen-no  Nebulizer-yes      Past Medical History:   Diagnosis Date    Allergic rhinitis     on allergy shots    Anemia     Asthma     Borderline diabetic     Cataract     Chronic lung disease     Cigarette smoker     abuse quit     COPD [facility]chronic obstructive pulmonary disease) ([facility] [address]     Degenerative arthritis     both shoulders    Depression     Easy bruising     Hypertension     Nervousness     Osteoporosis 10/27/99       Past Surgical History:   Procedure Laterality Date    HX BACK SURGERY      nerve block    HX GYN  1974    hysterectomy    HX GYN  1970    D & C    HX HEENT  age 3    tonsillectomy    HX ORTHOPAEDIC  age 21    right leg tumor removed benign       Social History     Socioeconomic History    Marital status:      Spouse name: Not on file    Number of children: Not on file    Years of education: Not on file    Highest education level: Not on file   Tobacco Use    Smoking status: Former Smoker     Packs/day: 2.50     Years: 45.00     Pack years: 112.50     Types: Cigarettes     Start date: 9/10/1959     Last attempt to quit: 2006     Years since quittin.7    Smokeless tobacco: Never Used    Tobacco comment: smoked for 50 years   Substance and Sexual Activity    Alcohol use: Yes     Alcohol/week: 2.0 - 3.0 standard drinks     Types: 2 - 3 Standard drinks or equivalent per week     Comment: occ    Drug use: No    Sexual activity: Not Currently     Family History   Problem Relation Age of Onset   [facility] Arthritis-rheumatoid Mother     Hypertension Mother     Headache Mother     Elevated Lipids Mother     Lung Disease Father     Cancer Maternal Aunt     Lung Disease Maternal Uncle        Allergies   Allergen Reactions    Qvar [Beclomethasone Dipropionate] Shortness of Breath    Entex [Phenylephrine-Guaifenesin] Other (comments)     intolerance    Sulfa (Sulfonamide Antibiotics) Other (comments)       . Current Outpatient Medications   Medication Sig Dispense Refill    albuterol sulfate (PROVENTIL;VENTOLIN) 2.5 mg/0.5 mL nebu nebulizer solution 2.5 mg by Nebulization route every four (4) hours as needed for Wheezing or Shortness of Breath. [employer]      gabapentin (Neurontin) 400 mg capsule Take 1 cap tid x 1 week, then 1 cap bid x 1 week, then 1 cap every day x 1 week and then stop 42 Cap 0    meloxicam (MOBIC) 15 mg tablet Take 15 mg by mouth as needed.  Anoro Ellipta 62.5-25 mcg/actuation inhaler Take 1 Puff by inhalation daily.  DULoxetine (CYMBALTA) 20 mg capsule Take 90 mg by mouth daily.  gabapentin (NEURONTIN) 100 mg capsule 600 mg.      lisinopril (PRINIVIL, ZESTRIL) 10 mg tablet TAKE 1 TABLET BY MOUTH DAILY 30 Tab 0    sertraline (ZOLOFT) 100 mg tablet TAKE 1 TABLET BY MOUTH EVERY DAY 30 Tab 6    albuterol (VENTOLIN HFA) 90 mcg/actuation inhaler Take 2 Puffs by inhalation every four (4) hours as needed for Wheezing. 1 Inhaler 2    pregabalin (LYRICA) 50 mg capsule Take 1 Cap by mouth two (2) times a day. Max Daily Amount: 100 mg. 60 Cap 1    pregabalin (LYRICA) 50 mg capsule Take 1 Cap by mouth two (2) times a day.  Max Daily Amount: 100 mg. 60 Cap 1    pravastatin (PRAVACHOL) 20 mg tablet       cyclobenzaprine (FLEXERIL) 5 mg tablet Take 5 mg by mouth three (3) times daily as needed for Muscle Spasm(s).  diazepam (VALIUM) 5 mg tablet Take 1 Tab by mouth two (2) times daily as needed for Anxiety. (Patient not taking: Reported on 8/27/2020) 60 Tab 2         Review of Systems:  HEENT: No epistaxis, no nasal drainage, no difficulty in swallowing, no redness in eyes  Respiratory: as above  Cardiovascular: no chest pain, no palpitations, no chronic leg edema, no syncope  Gastrointestinal: no abd pain, no vomiting, no diarrhea, no bleeding symptoms  Genitourinary: No urinary symptoms or hematuria  Integument/breast: No ulcers or rashes  Musculoskeletal:Neg  Neurological: No focal weakness, no seizures, no headaches  Behvioral/Psych: No anxiety, no depression  Constitutional: No fever, no chills, no weight loss, no night sweats     Objective:     Visit Vitals  /80 (BP 1 Location: Left arm, BP Patient Position: Sitting)   Pulse 94   Temp 98.3 °F (36.8 °C)   Resp 20   Ht 5' 3\" (1.6 m)   Wt 72.6 kg (160 lb)   LMP 01/01/1974   SpO2 96%   BMI 28.34 kg/m²        Physical Exam:   General: comfortable, no acute distress  HEENT: pupils reactive, sclera anicteric, EOM intact  Neck: No adenopathy or thyroid swelling, no lymphadenopathy or JVD, supple  CVS: S1S2 no murmurs  RS: Mod AE bilaterally, no tactile fremitus or egophony, no accessory muscle use  Abd: soft, non tender, no hepatosplenomegaly  Neuro: non focal, awake, alert  Extrm: no leg edema, clubbing or cyanosis  Skin: no rash    Data review:   Pertinent labs: CBC, BMP, LFT's    PFT:    Date FVC FEV1  FEV1/FVC [id number] TLC RV RV/TLC VC DLCO   6/18/2019  81  53  64  25  106  149  140  69  48/73                                         6 min walk test; madeline SaO2 89%      No results found for this or any previous visit.     Imaging:  I have personally reviewed the patients radiographs and have reviewed the reports:  XR Results (most recent):  Results from Hospital Encounter encounter on 05/28/20   0 [facility] XR TECHNOLOGIST SERVICE    Narrative Fluoroscopy was provided for a bundled exam for documentation purposes. Impression IMPRESSION:    Please see above. FLUORO TIME: 00.20    AJB       CT Results (most recent):  Results from Hospital Encounter encounter on 10/11/13   CT ABD PELV WO CONT    Narrative EXAM:  CT ABD PELV WO CONT    INDICATION:  hematuria; dysuria; r/o stones    COMPARISON: None. CONTRAST:  None. TECHNIQUE:  Unenhanced multislice helical CT was performed from the diaphragm to the  symphysis pubis without oral or intravenous contrast administration. Contiguous  5 mm axial images were reconstructed and lung and soft tissue windows were  generated. Coronal and sagittal reformations were generated. FINDINGS:  The visualized portions of the lung bases are clear. The absence of intravenous contrast material reduces the sensitivity for  evaluation of the solid parenchymal organs of the abdomen. Hepatomegaly, liver  measures up to 24.5 cm in diameter. There may be mild hepatic steatosis. Gallbladder is distended, however there are no signs of surrounding  inflammation. No gallstones are visualized by CT. The spleen, adrenal glands,  pancreas and kidneys are within normal limits. No renal stones are seen. There  is atherosclerosis of the aorta. No dilated loops of small bowel. Sigmoid diverticula are noted. No definite evidence of diverticulitis. Scattered colonic diverticula. Patient is status post hysterectomy. No free  fluid in the pelvis. Nondistended bladder is within normal limits. No evidence of aortic aneurysm. Grade 1 anterior listhesis of L4 on L5. Facet arthropathy at L4-L5. No lytic or  blastic lesions are identified. Impression Impression:  1. No renal stones or hydronephrosis. 2. Hepatomegaly. There may be mild hepatic steatosis. 3. Colonic diverticulosis.  No definite evidence of diverticulitis. Thank you for this referral.     .     Patient Active Problem List   Diagnosis Code    Anxiety F41.9    Asthma J45.909    Osteoporosis M81.0    Allergic rhinitis J30.9    COPD (chronic obstructive pulmonary disease) ([facility]) J44.9    Examination of eyes and vision Z01.00    Bilateral shoulder pain M25.511, M25.512    SOB (shortness of breath) R06.02    Atelectasis J98.11       Assessment:  COPD GOLD-stage III  CAT score more than 10      IMPRESSION:   · COPD-moderate to severe with FEV1 53% predicted on PFTs from 6/18/2019. Functional group C. Overall well compensated but limited due to dyspnea and would benefit from fine-tuning treatment  · Dysphagia-needs further evaluation  · Personal history of smoking-candidate for LD CT  · Anxiety  · Hypertension      RECOMMENDATIONS:   · Discussed with patient and daughter at length current diagnosis, pathophysiology and action plan based on available information so far  · Since Anoro has not been effective and patient is intolerant to inhaled corticosteroids will trial combination of Brovana and Spiriva Respimat as maintenance therapy with albuterol for rescue  · We will monitor response to above and make further adjustments  · Check modified barium swallow  · LD CT ordered which shared decision making  · We will consider referring back to pulmonary rehab  · Preventive vaccinations  · Continue maintenance therapies for COPD  · Address triggers, lifestyle and behavioral changes  · Healthy diet healthy weight  · Maintain active lifestyle  · Follow-up in 4 weeks     Health maintenance screens deferred to Primary care provider.      Srinivasa Anna MD

## 2020-09-11 NOTE — TELEPHONE ENCOUNTER
Per dtr, pharmacy states they received script for Nathan Murray but the dx code wasn't on prescription. Dtr would like to get asap so pt can start on it. Please call [phone] to let her know once done.

## 2020-09-13 RX ORDER — ARFORMOTEROL TARTRATE 15 UG/2ML
15 SOLUTION RESPIRATORY (INHALATION) 2 TIMES DAILY
Qty: 60 VIAL | Refills: 3 | Status: SHIPPED | OUTPATIENT
Start: 2020-09-13 | End: 2021-04-14

## 2020-09-15 ENCOUNTER — OFFICE VISIT (OUTPATIENT)
Dept: ORTHOPEDIC SURGERY | Age: 75
End: 2020-09-15

## 2020-09-15 VITALS
TEMPERATURE: 97.3 F | WEIGHT: 160 LBS | RESPIRATION RATE: 16 BRPM | HEART RATE: 91 BPM | DIASTOLIC BLOOD PRESSURE: 70 MMHG | HEIGHT: 63 IN | OXYGEN SATURATION: 95 % | SYSTOLIC BLOOD PRESSURE: 139 MMHG | BODY MASS INDEX: 28.35 KG/M2

## 2020-09-15 DIAGNOSIS — M19.011 GLENOHUMERAL ARTHRITIS, RIGHT: Primary | ICD-10-CM

## 2020-09-15 RX ORDER — TRIAMCINOLONE ACETONIDE 40 MG/ML
40 INJECTION, SUSPENSION INTRA-ARTICULAR; INTRAMUSCULAR ONCE
Qty: 1 ML | Refills: 0
Start: 2020-09-15 | End: 2020-09-15

## 2020-09-15 NOTE — PROGRESS NOTES
Natalie Mcdonald  1945   Chief Complaint   Patient presents with    Shoulder Pain     right shoulder pain        HISTORY OF PRESENT ILLNESS  Natalie Mcdonald is a 66 y.o. female who presents today for evaluation of right shoulder pain. She rates her pain 3/10 today. Pain has been present for awhile. Pt had a cortisone injection by Dr. Kristin Puentes on 7/09/2020 which provided minimal relief. Pt was last seen by myself in 2017. Pain with certain movements of the arm. Patient describes the pain as sharp that is Constant in nature. Symptoms are worse with ROM and is better with  Rest. Associated symptoms include nothing. Since problem started, it: is unchanged. Pain does not wake patient up at night. Has taken no meds for the problem. Has tried following treatments: Injections:YES; Brace:NO;  Therapy:NO; Cane/Crutch:NO       Allergies   Allergen Reactions    Qvar [Beclomethasone Dipropionate] Shortness of Breath    Entex [Phenylephrine-Guaifenesin] Other (comments)     intolerance    Sulfa (Sulfonamide Antibiotics) Other (comments)        Past Medical History:   Diagnosis Date    Allergic rhinitis     on allergy shots    Anemia     Asthma     Borderline diabetic     Cataract     Chronic lung disease     Cigarette smoker     abuse quit 2006    [facility]     Degenerative arthritis     both shoulders    Depression     Easy bruising     Hypertension     Nervousness     Osteoporosis 10/27/99    Personal history of smoking 9/10/2020    Pharyngoesophageal dysphagia 9/10/2020      Social History     Socioeconomic History    Marital status:      Spouse name: Not on file    Number of children: Not on file    Years of education: Not on file    Highest education level: Not on file   Occupational History    Not on file   Social Needs    Financial resource strain: Not on file    Food insecurity     Worry: Not on file     Inability: Not on file   [facility] Transportation needs     Medical: Not on file     Non-medical: Not on file   Tobacco Use    Smoking status: Former Smoker     Packs/day: 2.50     Years: 45.00     Pack years: 112.50     Types: Cigarettes     Start date: 9/10/1959     Last attempt to quit: 2006     Years since quittin.7    Smokeless tobacco: Never Used    Tobacco comment: smoked for 50 years   Substance and Sexual Activity    Alcohol use: Yes     Alcohol/week: 2.0 - 3.0 standard drinks     Types: 2 - 3 Standard drinks or equivalent per week     Comment: occ    Drug use: No    Sexual activity: Not Currently   Lifestyle    Physical activity     Days per week: Not on file     Minutes per session: Not on file    Stress: Not on file   Relationships    Social connections     Talks on phone: Not on file     Gets together: Not on file     Attends Latter-day service: Not on file     Active member of club or organization: Not on file     Attends meetings of clubs or organizations: Not on file     Relationship status: Not on file    Intimate partner violence     Fear of current or ex partner: Not on file     Emotionally abused: Not on file     Physically abused: Not on file     Forced sexual activity: Not on file   Other Topics Concern    Not on file   Social History Narrative    Not on file      Past Surgical History:   Procedure Laterality Date    HX BACK SURGERY      nerve block    HX GYN  1974    hysterectomy    HX GYN  3    D & C    HX HEENT  age 3    tonsillectomy    HX ORTHOPAEDIC  age 21    right leg tumor removed benign      Family History   Problem Relation Age of Onset   [facility] Arthritis-rheumatoid Mother     Hypertension Mother     Headache Mother     Elevated Lipids Mother     Lung Disease Father     Cancer Maternal Aunt     Lung Disease Maternal Uncle       Current Outpatient Medications   Medication Sig    arformoteroL (Brovana) 15 mcg/2 mL nebu neb solution 2 mL by Nebulization route two (2) times a day. J44.9. Indications: severe copd    albuterol sulfate (PROVENTIL;VENTOLIN) 2.5 mg/0.5 mL nebu nebulizer solution 2.5 mg by Nebulization route every four (4) hours as needed for Wheezing or Shortness of Breath. [employer]    tiotropium bromide (Spiriva Respimat) 2.5 mcg/actuation inhaler Take 2 Puffs by inhalation daily.  gabapentin (Neurontin) 400 mg capsule Take 1 cap tid x 1 week, then 1 cap bid x 1 week, then 1 cap every day x 1 week and then stop    pregabalin (LYRICA) 50 mg capsule Take 1 Cap by mouth two (2) times a day. Max Daily Amount: 100 mg.    meloxicam (MOBIC) 15 mg tablet Take 15 mg by mouth as needed.  DULoxetine (CYMBALTA) 20 mg capsule Take 90 mg by mouth daily.  gabapentin (NEURONTIN) 100 mg capsule 600 mg.    lisinopril (PRINIVIL, ZESTRIL) 10 mg tablet TAKE 1 TABLET BY MOUTH DAILY    sertraline (ZOLOFT) 100 mg tablet TAKE 1 TABLET BY MOUTH EVERY DAY    albuterol (VENTOLIN HFA) 90 mcg/actuation inhaler Take 2 Puffs by inhalation every four (4) hours as needed for Wheezing.  pregabalin (LYRICA) 50 mg capsule Take 1 Cap by mouth two (2) times a day. Max Daily Amount: 100 mg.  pravastatin (PRAVACHOL) 20 mg tablet     cyclobenzaprine (FLEXERIL) 5 mg tablet Take 5 mg by mouth three (3) times daily as needed for Muscle Spasm(s).  diazepam (VALIUM) 5 mg tablet Take 1 Tab by mouth two (2) times daily as needed for Anxiety. (Patient not taking: Reported on 8/27/2020)     No current facility-administered medications for this visit. REVIEW OF SYSTEM   Patient denies: Weight loss, Fever/Chills, HA, Visual changes, Fatigue, Chest pain, SOB, Abdominal pain, N/V/D/C, Blood in stool or urine, Edema. Pertinent positive as above in HPI.  All others were negative    PHYSICAL EXAM:   Visit Vitals  /70 (BP 1 Location: Left arm, BP Patient Position: Sitting)   Pulse 91   Temp 97.3 °F (36.3 °C) (Oral)   Resp 16   Ht 5' 3\" (1.6 m)   Wt 160 lb (72.6 kg)   LMP 01/01/1974   SpO2 95%   BMI 28.34 kg/m²     The patient is a well-developed, well-nourished female   in no acute distress. The patient is alert and oriented times three. The patient is alert and oriented times three. Mood and affect are normal.  LYMPHATIC: lymph nodes are not enlarged and are within normal limits  SKIN: normal in color and non tender to palpation. There are no bruises or abrasions noted. NEUROLOGICAL: Motor sensory exam is within normal limits. Reflexes are equal bilaterally. There is normal sensation to pinprick and light touch  MUSCULOSKELETAL:  Examination Right shoulder   Skin Intact   AC joint tenderness -   Biceps tenderness -   Forward flexion/Elevation ROM 60   Active abduction ROM 60   Glenohumeral abduction 45   External rotation ROM 0   Internal rotation ROM 30   Apprehension -   Andrews Relocation -   Jerk -   Load and Shift -   Obriens -   Speeds -   Impingement sign -   Supraspinatus/Empty Can -, 5/5   External Rotation Strength -, 5/5   Lift Off/Belly Press -, 5/5   Neurovascular Intact       PROCEDURE: Right Glenohumeral Joint Injection with Ultrasound Guidance  Indication:Right Glenohumeral Joint pain/swelling    After sterile prep, 6 cc of Xylocaine and 1 cc of Kenalog were injected into the right glenohumeral joint. Ultrasound images captured using [address] Ultrasound machine and scanned into patient's chart.        [facility] - [facility]  OFFICE PROCEDURE PROGRESS NOTE        Chart reviewed for the following:  Dedrick Cheng M.D, have reviewed the History, Physical and updated the Allergic reactions for Darian Ortiz performed immediately prior to start of procedure:  Dedrick Cheng M.D, have performed the following reviews on Caesar Clarice prior to the start of the procedure:            * Patient was identified by name and date of birth   * Agreement on procedure being performed was verified  * Risks and Benefits explained to the patient  * Procedure site verified and marked as necessary  * Patient was positioned for comfort  * Consent was signed and verified     Time: 10:08 AM     Date of procedure: 9/15/2020    Procedure performed by:  Arnaud Biggs M.D    Provider assisted by: (see medication administration)    How tolerated by patient: tolerated the procedure well with no complications    Comments: none      IMAGING: XR of right shoulder from [facility] dated 5/29/2020 was reviewed and read by Dr. Snell Search: Marked degenerative changes in the glenohumeral joint      IMPRESSION:      ICD-10-CM ICD-9-CM    1. Glenohumeral arthritis, right  M19.011 715.91 TRIAMCINOLONE ACETONIDE INJ      triamcinolone acetonide (Kenalog) 40 mg/mL injection      US GUIDE INJ/ASP/ARTHRO LG JNT/BURSA        PLAN:  1. Pt presents today with right shoulder pain due to glenohumeral arthritis and I would like to try injecting the glenohumeral joint today. Risk factors include: htn   2. No ultrasound exam indicated today  3. Yes cortisone injection indicated today R GLENOHUMERAL JOINT US  4. No Physical/Occupational Therapy indicated today  5. No diagnostic test indicated today:   6. No durable medical equipment indicated today  7. No referral indicated today   8. No medications indicated today:   9. No Narcotic indicated today       RTC 4 weeks if pain continues      Scribed by Noe Gaona [address]) as dictated by Arnaud Biggs MD    I, Dr. Arnaud Biggs, confirm that all documentation is accurate.     Arnaud Biggs M.D.   [facility]

## 2020-09-21 ENCOUNTER — TELEPHONE (OUTPATIENT)
Dept: PULMONOLOGY | Age: 75
End: 2020-09-21

## 2020-09-21 DIAGNOSIS — J44.9 STAGE 3 SEVERE COPD BY GOLD CLASSIFICATION (HCC): Primary | ICD-10-CM

## 2020-09-28 DIAGNOSIS — M47.817 LUMBOSACRAL SPONDYLOSIS WITHOUT MYELOPATHY: ICD-10-CM

## 2020-09-28 DIAGNOSIS — M48.061 SPINAL STENOSIS OF LUMBAR REGION, UNSPECIFIED WHETHER NEUROGENIC CLAUDICATION PRESENT: ICD-10-CM

## 2020-09-28 DIAGNOSIS — M51.36 DDD (DEGENERATIVE DISC DISEASE), LUMBAR: ICD-10-CM

## 2020-09-28 DIAGNOSIS — M54.16 LUMBAR NEURITIS: ICD-10-CM

## 2020-09-29 ENCOUNTER — TELEPHONE (OUTPATIENT)
Dept: PULMONOLOGY | Age: 75
End: 2020-09-29

## 2020-09-29 ENCOUNTER — NURSE NAVIGATOR (OUTPATIENT)
Dept: OTHER | Age: 75
End: 2020-09-29

## 2020-09-29 NOTE — TELEPHONE ENCOUNTER
Daughter states when filling the Brovana they only filled 30 vials and not the 60 vials. I called the pharmacy and the rx did have 60 vials. They state they did not see the vials vs ml so they filled 60 ml instead which is 30 vials and only will last 15 days.  They fixed the rx to fill 60 vials which it was written for

## 2020-09-29 NOTE — TELEPHONE ENCOUNTER
dtr called back, please try to call again, for some reason our phone number was blocked, but she unblocked it.

## 2020-09-29 NOTE — TELEPHONE ENCOUNTER
Daughter calling re cost of Tone Jimenez. $135 filled under medicare part b. Patient can not afford. She will run out of first rx on Saturday afternoon. Patient is scheduled to see Dr. Charla Crocker on 10/8.  Please advise

## 2020-09-29 NOTE — TELEPHONE ENCOUNTER
Pt daughter states pt is unable to afford refill for nebulizer solution and is almost out. Wanting to know options. Please advise [phone].

## 2020-09-29 NOTE — NURSE NAVIGATOR
Referring Provider: Yumi Munoz MD      Lung Cancer Risk Profile:   Age: 66  Gender: Female  Height: 63\"  Weight: 160#    Smoking History:  Smoking Status: past use  # years smokin  # years quit: 14  Packs/day: 3  Pack years: 120    Patient discussed smoking cessation with PCP: Unknown    Patient participated in shared decision making process with PCP: Yes, per provider note    Patient is currently experiencing symptoms: No, per provider order    If yes what symptoms:     Co-Morbidities:  COPD    Cancer History:      Additional Risk Factors:          Patient's smoking history verified by provider's note. Patient meets LDCT lung cancer screening criteria.        ROOSEVELT ZavalaN, RN, OCN  Lung Health Nurse Navigator

## 2020-10-01 ENCOUNTER — HOSPITAL ENCOUNTER (OUTPATIENT)
Dept: CT IMAGING | Age: 75
Discharge: HOME | End: 2020-10-01
Attending: INTERNAL MEDICINE
Payer: MEDICARE

## 2020-10-01 DIAGNOSIS — Z87.891 PERSONAL HISTORY OF SMOKING: ICD-10-CM

## 2020-10-01 PROCEDURE — G0297 LDCT FOR LUNG CA SCREEN: HCPCS

## 2020-10-06 ENCOUNTER — HOSPITAL ENCOUNTER (OUTPATIENT)
Dept: GENERAL RADIOLOGY | Age: 75
Discharge: HOME | End: 2020-10-06
Attending: INTERNAL MEDICINE
Payer: MEDICARE

## 2020-10-06 DIAGNOSIS — R13.14 PHARYNGOESOPHAGEAL DYSPHAGIA: ICD-10-CM

## 2020-10-06 PROCEDURE — 74230 X-RAY XM SWLNG FUNCJ C+: CPT

## 2020-10-06 PROCEDURE — 74011000255 HC RX REV CODE- 255: Performed by: INTERNAL MEDICINE

## 2020-10-06 PROCEDURE — 92611 MOTION FLUOROSCOPY/SWALLOW: CPT

## 2020-10-06 RX ADMIN — BARIUM SULFATE 30 G: 960 POWDER, FOR SUSPENSION ORAL at 13:20

## 2020-10-06 RX ADMIN — BARIUM SULFATE 700 MG: 700 TABLET ORAL at 13:20

## 2020-10-06 RX ADMIN — BARIUM SULFATE 30 ML: 400 PASTE ORAL at 13:20

## 2020-10-06 NOTE — PROGRESS NOTES
[address] OUTPATIENT MODIFIED BARIUM SWALLOW STUDY    Patient: Kenisha Darden (7 y.o. female)  Date: 10/6/2020  Primary Diagnosis: Pharyngoesophageal dysphagia [R13.14]  Precautions: None on file      Prior Level of Swallowing Function/Home Situation: Pt reports regular diet with thin liquids; reporting coughing with intake \"sometimes\"; Denying difficulty with pills-takes 8 pills at a time with no difficulty     Assessment:  Based on the objective data described below, the patient presents with min pharyngeal dysphagia and suspected esophageal dysphagia. Pt seen seated upright in flouro chair at 90 degrees, tolerating all consistencies presented (thin +/- straw, pudding and regular solids) with no aspiration or penetration visualized. Pt unable to clear 13 mm Ba pill from oral cavity despite multiple liquid washes with eventual ejection. Pt demo timely swallow initiation, adequate laryngeal elevation/adduction and positive epiglottic tilt across trials. 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 Safe for continuation of regular diet with thin liquids with aspiration precautions including upright for all intake and for at least 30 min after intake, small bites/sips and alternating bites/sips. May benefit from GI consult to address suspected esophageal dysphagia. Education completed with pt and pt's dtr regarding diet recs, comp strategies, aspiration risk/precautions. Both were able to verbalize understanding. Recommendations:   Regular diet with thin liquids  Aspiration precautions  HOB >45 during po intake, remain >30 for 30-45 minutes after po   Small bites/sips; alternating bites/sips   Oral care TID  Meds per pt preference  ?  May benefit from GI consult to address suspected esophageal dysphagia      SUBJECTIVE:   Patient stated It doesn't happen all the time (regarding coughing with intake)     OBJECTIVE:     Past Medical History:   Diagnosis Date    Allergic rhinitis     on allergy shots    Anemia     Asthma     Borderline diabetic     Cataract     Chronic lung disease     Cigarette smoker     abuse quit 2006    COPD (chronic obstructive pulmonary disease) ([facility] [address]     Degenerative arthritis     both shoulders    Depression     Easy bruising     Hypertension     Nervousness     Osteoporosis 10/27/99    Personal history of smoking 9/10/2020    Pharyngoesophageal dysphagia 9/10/2020     Past Surgical History:   Procedure Laterality Date    HX BACK SURGERY      nerve block    HX GYN  1974    hysterectomy    HX GYN  1970    D & C    HX HEENT  age 3    tonsillectomy    HX ORTHOPAEDIC  age 21    right leg tumor removed benign     Current Diet:  Regular diet with thin liquids    Radiology:  Film Views: Lateral;Fluoro  Patient Position: 90 in chair    Trial 1:   Consistency Presented: Thin liquid; Solid;Pudding   How Presented: Self-fed/presented;Cup/sip;Spoon;Straw;Successive swallows   Bolus Acceptance: No impairment   Bolus Formation/Control: No impairment:     Propulsion: No impairment   Oral Residue: None   Initiation of Swallow: No impairment   Timing: No impairment   Penetration: None   Aspiration/Timing: No evidence of aspiration   Pharyngeal Clearance: Less than 10%; Pyriform residue    Attempted Modifications: Alternate liquids/solids   Effective Modifications:  Alternate liquids/solids   Cues for Modifications: Minimal     Decreased Tongue Base Retraction?: No  Laryngeal Elevation: WFL (within functional limits)  Aspiration/Penetration Score: 1 (No penetration or aspiration-Contrast does not enter the airway)  Pharyngeal Symmetry: Not assessed  Pharyngeal-Esophageal Segment: Decreased relaxation of upper esophageal segment  Pharyngeal Dysfunction: Crico-pharyngeal dysfunction  Oral Phase Severity: No impairment  Pharyngeal Phase Severity: Minimal    8-point Penetration-Aspiration Scale: Score 1    PAIN:  Pt reports 0/10 pain or discomfort prior to MBS. Pt reports 0/10 pain or discomfort post MBS. COMMUNICATION/EDUCATION:   [x]  Education provided post diagnostic testing including oropharyngeal anatomy/physiology, MBS results, diet recommendations and        compensatory strategies/positioning. []  Video feedback utilized. []  Handout regarding diet recommendations and thickener instructions provided. [x]  Patient/family have participated as able in goal setting and plan of care. []  Patient/family agree to work toward stated goals and plan of care. []  Patient understands intent and goals of therapy, but is neutral about his/her participation. []  Patient is unable to participate in goal setting and plan of care.     Thank you for this referral,  TERRY De Leon.S., [id number] [facility]  Speech-Language Pathologist

## 2020-10-07 ENCOUNTER — TELEPHONE (OUTPATIENT)
Dept: ORTHOPEDIC SURGERY | Age: 75
End: 2020-10-07

## 2020-10-07 NOTE — TELEPHONE ENCOUNTER
Called patients daughter Lacretia Cabot [phone] and informed her that we will keep her appointment at 955 am but they need to [facility] sure they arrive 15 minutes early to be checked in. Patients daughter verbalized understanding. No further action needed at this time.

## 2020-10-07 NOTE — TELEPHONE ENCOUNTER
Patients daughter, Dot Gonzalez, was contacted to r/s 10/8 appt to 8:55. Patient is not able to be seen at that time and they need to confirm if she can keep original time of 9:55 instead. Please advise Dot Gonzalez at [phone].

## 2020-10-08 ENCOUNTER — OFFICE VISIT (OUTPATIENT)
Dept: PULMONOLOGY | Age: 75
End: 2020-10-08
Payer: MEDICARE

## 2020-10-08 ENCOUNTER — TELEPHONE (OUTPATIENT)
Dept: PULMONOLOGY | Age: 75
End: 2020-10-08

## 2020-10-08 VITALS
SYSTOLIC BLOOD PRESSURE: 125 MMHG | HEART RATE: 82 BPM | RESPIRATION RATE: 14 BRPM | HEIGHT: 63 IN | BODY MASS INDEX: 28.34 KG/M2 | TEMPERATURE: 98.3 F | DIASTOLIC BLOOD PRESSURE: 52 MMHG | OXYGEN SATURATION: 94 %

## 2020-10-08 DIAGNOSIS — J45.20 MILD INTERMITTENT ASTHMA WITHOUT COMPLICATION: ICD-10-CM

## 2020-10-08 DIAGNOSIS — J44.9 STAGE 3 SEVERE COPD BY GOLD CLASSIFICATION (HCC): Primary | ICD-10-CM

## 2020-10-08 DIAGNOSIS — R06.02 SOB (SHORTNESS OF BREATH): ICD-10-CM

## 2020-10-08 DIAGNOSIS — R13.14 PHARYNGOESOPHAGEAL DYSPHAGIA: ICD-10-CM

## 2020-10-08 PROCEDURE — G8419 CALC BMI OUT NRM PARAM NOF/U: HCPCS | Performed by: INTERNAL MEDICINE

## 2020-10-08 PROCEDURE — 99214 OFFICE O/P EST MOD 30 MIN: CPT | Performed by: INTERNAL MEDICINE

## 2020-10-08 PROCEDURE — G8427 DOCREV CUR MEDS BY ELIG CLIN: HCPCS | Performed by: INTERNAL MEDICINE

## 2020-10-08 PROCEDURE — 1101F PT FALLS ASSESS-DOCD LE1/YR: CPT | Performed by: INTERNAL MEDICINE

## 2020-10-08 PROCEDURE — 1090F PRES/ABSN URINE INCON ASSESS: CPT | Performed by: INTERNAL MEDICINE

## 2020-10-08 PROCEDURE — G8432 DEP SCR NOT DOC, RNG: HCPCS | Performed by: INTERNAL MEDICINE

## 2020-10-08 PROCEDURE — G8536 NO DOC ELDER MAL SCRN: HCPCS | Performed by: INTERNAL MEDICINE

## 2020-10-08 RX ORDER — FORMOTEROL FUMARATE DIHYDRATE 20 UG/2ML
20 SOLUTION RESPIRATORY (INHALATION) ONCE
Qty: 30 EACH | Refills: 0 | Status: SHIPPED | OUTPATIENT
Start: 2020-10-08 | End: 2020-10-08

## 2020-10-08 RX ORDER — FORMOTEROL FUMARATE DIHYDRATE 20 UG/2ML
20 SOLUTION RESPIRATORY (INHALATION)
Qty: 30 EACH | Refills: 0 | Status: SHIPPED | OUTPATIENT
Start: 2020-10-08 | End: 2021-04-14

## 2020-10-08 NOTE — LETTER
10/8/20 Patient: Andrea Cannon YOB: 1945 Date of Visit: 10/8/2020 Luis Marcelino MD 
[facility] [address] [address] VIA Facsimile: [phone] Dear Luis Marcelino MD, Thank you for referring Ms. Derick Kenny to [address] [place] [address] for evaluation. My notes for this consultation are attached. If you have questions, please do not hesitate to call me. I look forward to following your patient along with you.  
 
 
Sincerely, 
 
Alma Persaud MD

## 2020-10-08 NOTE — TELEPHONE ENCOUNTER
Radha Vu verified the directions and amount of vials on the Perforomist prescription. She also needs a Diag. Code.

## 2020-10-08 NOTE — TELEPHONE ENCOUNTER
[facility] Sends a fax notifying us that Spiriva Respimat is not covered on the pt's plan.

## 2020-10-08 NOTE — PROGRESS NOTES
[facility]  Pulmonary, Critical Care, and Sleep Medicine      Pulmonary Office follow-up visit    Name: Sukhwinder Lorenzo     : 1945     Date: 10/8/2020        Subjective:   Patient has been referred for evaluation of: COPD. 10/08/20   Patient states that she felt significantly improved with nebulized Brovana-was able to sleep all night for the first time. However when she went to get her prescription the cost was prohibitive and therefore she asked for alternate therapy. After checking with her formulary it was noted that her performist discovered at at a lesser cost.  I was awaiting some samples for her to try. In the meantime patient did complete requested testing-LD CT and a barium swallow. Both have been resulted with no significant findings. Patient is here for follow-up visit accompanied with her daughter    HPI  Patient is a 66 y.o. female who is accompanied by her daughter and gives a history of being diagnosed with COPD around . Patient has been followed at [facility] pulmonology-Dr. Liliana Mcpherson who recently left the area and therefore is seeking a new pulmonologist.  Emerson Gloss a history of having 3 pack a day smoking until about 14 years back when she quit. She has symptoms of shortness of breath on minimal exertion on a daily basis. She mentions getting hot sweaty with any physical activity and is not able to lift any objects, bend stoop without getting out of breath. She paces her activities of daily living. Has not climbed a stair in long time as far she can remember. She has been on Anoro and recently was prescribed-nebulized Henriquez Sarks but her insurance did not cover and therefore she is continued on the Anoro which she thinks is not helping and Dr. Liliana Mcpherson felt that patient's inhalation techniques were part of the issue. She denies any orthopnea or paroxysmal nocturnal dyspnea  Denies any pedal edema  Patient mentions having some cough-not very bothersome.   Her daughter although contradicts and states that she has a daily cough and she gets easily choked on food. Patient states that usually if she eats crackers, kernels of corn rice she feels getting choked in her lower throat and starts coughing. She is also noticed this with several liquids. Has intermittent wheezing  Denies chest pain  She has not been on any home oxygen  Her pulmonary function tests were done about a year ago at [address] [place] [address]  She does not recall ever having a CT scan of her chest  Patient states that she is not able to tolerate inhaled corticosteroids  She has completed pulmonary rehab in 2016.     1 2 3 4 5   Cough  2      Mucus 1       Chest tightness 1       ADL's  2      Climb 1 flight stairs     5   Sleep 1       Energy level  2        Scale 1   2  3  4  5  Never to all the time    Or CAT> 10     Patient is a retired   Occupational exposure-none to any inhalational dust fumes or chemicals  Environmental exposures-has 2 dogs at home.   They do not shed  ILD history:  No Hx of connective tissue disease such as RA, Lupus, Scleroderma  Now Hx Raynauds  No Hx sarcoidosis  No Hx taking medications- methotrexate, Amiodarone, Nitrofurantoin  No Hx cancer, chemotherapy, radiation  No Hx Birds, chickens, farm animals    Review of data:  Testing:  CXR  CT [id number]  PFT-likely 6/18/2019  Echo-now  6 min walk-[place]    Vaccinations:  Pneumococcal  Influenza    DME:  Oxygen-no  Nebulizer-yes      Past Medical History:   Diagnosis Date    Allergic rhinitis     on allergy shots    Anemia     Asthma     Borderline diabetic     Cataract     Chronic lung disease     Cigarette smoker     abuse quit 2006    COPD (chronic obstructive pulmonary disease) ([facility] [address]     Degenerative arthritis     both shoulders    Depression     Easy bruising     Hypertension     Nervousness     Osteoporosis 10/27/99    Personal history of smoking 9/10/2020    Pharyngoesophageal dysphagia 9/10/2020       Past Surgical History:   Procedure Laterality Date    HX BACK SURGERY      nerve block    HX GYN  1974    hysterectomy    HX GYN  1970    D & C    HX HEENT  age 3    tonsillectomy    HX ORTHOPAEDIC  age 21    right leg tumor removed benign     Allergies   Allergen Reactions    Qvar [Beclomethasone Dipropionate] Shortness of Breath    Entex [Phenylephrine-Guaifenesin] Other (comments)     intolerance    Sulfa (Sulfonamide Antibiotics) Other (comments)     Current Outpatient Medications   Medication Sig Dispense Refill    arformoteroL (Brovana) 15 mcg/2 mL nebu neb solution 2 mL by Nebulization route two (2) times a day. J44.9. Indications: severe copd 60 Vial 3    albuterol sulfate (PROVENTIL;VENTOLIN) 2.5 mg/0.5 mL nebu nebulizer solution 2.5 mg by Nebulization route every four (4) hours as needed for Wheezing or Shortness of Breath. [employer]      tiotropium bromide (Spiriva Respimat) 2.5 mcg/actuation inhaler Take 2 Puffs by inhalation daily. 1 Inhaler 0    pregabalin (LYRICA) 50 mg capsule Take 1 Cap by mouth two (2) times a day. Max Daily Amount: 100 mg. 60 Cap 1    pregabalin (LYRICA) 50 mg capsule Take 1 Cap by mouth two (2) times a day. Max Daily Amount: 100 mg. 60 Cap 1    meloxicam (MOBIC) 15 mg tablet Take 15 mg by mouth as needed.  DULoxetine (CYMBALTA) 20 mg capsule Take 90 mg by mouth daily.  gabapentin (NEURONTIN) 100 mg capsule 600 mg.      lisinopril (PRINIVIL, ZESTRIL) 10 mg tablet TAKE 1 TABLET BY MOUTH DAILY 30 Tab 0    sertraline (ZOLOFT) 100 mg tablet TAKE 1 TABLET BY MOUTH EVERY DAY 30 Tab 6    albuterol (VENTOLIN HFA) 90 mcg/actuation inhaler Take 2 Puffs by inhalation every four (4) hours as needed for Wheezing.  1 Inhaler 2    gabapentin (Neurontin) 400 mg capsule Take 1 cap tid x 1 week, then 1 cap bid x 1 week, then 1 cap every day x 1 week and then stop 42 Cap 0    pravastatin (PRAVACHOL) 20 mg tablet       cyclobenzaprine (FLEXERIL) 5 mg tablet Take 5 mg by mouth three (3) times daily as needed for Muscle Spasm(s).  diazepam (VALIUM) 5 mg tablet Take 1 Tab by mouth two (2) times daily as needed for Anxiety. (Patient not taking: Reported on 8/27/2020) 60 Tab 2     Review of Systems:  HEENT: No epistaxis, no nasal drainage, no difficulty in swallowing, no redness in eyes  Respiratory: as above  Cardiovascular: no chest pain, no palpitations, no chronic leg edema, no syncope  Gastrointestinal: no abd pain, no vomiting, no diarrhea, no bleeding symptoms  Genitourinary: No urinary symptoms or hematuria  Integument/breast: No ulcers or rashes  Musculoskeletal:Neg  Neurological: No focal weakness, no seizures, no headaches  Behvioral/Psych: No anxiety, no depression  Constitutional: No fever, no chills, no weight loss, no night sweats     Objective:     Visit Vitals  BP (!) 125/52 (BP 1 Location: Left arm, BP Patient Position: Sitting)   Pulse 82   Temp 98.3 °F (36.8 °C) (Oral)   Resp 14   Ht 5' 3\" (1.6 m)   LMP 01/01/1974   SpO2 94% Comment: RA Rest   BMI 28.34 kg/m²        Physical Exam:   General: comfortable, no acute distress  HEENT: pupils reactive, sclera anicteric, EOM intact  Neck: No adenopathy or thyroid swelling, no lymphadenopathy or JVD, supple  CVS: S1S2 no murmurs  RS: Mod AE bilaterally, no tactile fremitus or egophony, no accessory muscle use  Abd: soft, non tender, no hepatosplenomegaly  Neuro: non focal, awake, alert  Extrm: no leg edema, clubbing or cyanosis  Skin: no rash    Data review:   Pertinent labs: CBC, BMP, LFT's    PFT:    Date FVC FEV1  FEV1/FVC [id number] TLC RV RV/TLC VC DLCO   6/18/2019  81  53  64  25  106  149  140  69  48/73                                         6 min walk test; madeline SaO2 89%      No results found for this or any previous visit.     Imaging:  I have personally reviewed the patients radiographs and have reviewed the reports:  XR Results (most recent):  Results from Hospital Encounter encounter on 10/06/20   XR SWALLOW FUNC VIDEO    Narrative MODIFIED BARIUM SWALLOW. CLINICAL INDICATION/HISTORY: Dysphagia. Feeding difficulties. Intermittent  choking when eating. COMPARISON: None. TECHNIQUE: A live videoradiographic swallowing function study was performed in  conjunction with the speech therapist.  The video is available in the department  for review by speech pathology and ancillary staff. Fluoroscopic images were not made available in PACS for radiologist review and  this report is strictly a procedural documentation by the physician assistant  with input from speech pathology. It is not considered inclusive or exclusive  of anatomic abnormalities and is not diagnostic beyond the specific  considerations regarding swallowing function as it relates to airway protection  while eating and drinking. Fluoroscopy time: 48 seconds     Fluoroscopic images: 0    Electronic capture cine loops: 5    FINDINGS:    Patient swallowed multiple consistencies of barium mixtures including thin  liquids, pudding, solids and barium tablet challenge. There was no kathe penetration or aspiration with all tested consistencies. Piriform residuals were seen with solid consistency. The patient was unable to swallow a barium tablet. Impression IMPRESSION:    No kathe penetration or aspiration with all tested consistencies. Please see speech pathologist report for additional details and recommendations. CT Results (most recent):  Results from Hospital Encounter encounter on 10/01/20   CT LOW DOSE LUNG CANCER SCREENING    Narrative EXAM:  CT Chest without Contrast -- Low Dose Screening CT. CLINICAL INDICATION:     - Screening.  - Meets the criteria, i.e. 120 pack years smoking (40 yrs x 3 ppd),  asymptomatic. COMPARISON:  None. TECHNIQUE:      Low dose unenhanced multislice helical CT is performed from the thoracic inlet  to the adrenal glands without intravenous contrast administration.   Contiguous  axial images were reconstructed and lung and soft tissue windows were generated. Coronal and sagittal reformations were also generated. Dose reduction techniques are used, including automated exposure control,  adjustment of the mAs and/or kVp according to patient size, standardized  low-dose protocol, and/or iterative reconstruction technique. FINDINGS:    Technique Assessment:  The overall quality of the study is adequate for  diagnostic review. Lungs, Pleura:    - 0.2 cm nodule in the right upper lobe (axial #81). - Minor fissure-associated 0.4 cm hypodensity (axial #118). Right middle lobe  0.3 cm hypodensity (axial #122). - Right lower lobe 0.2 cm nodule (axial #117). Additional right lower lobe  semisolid density measuring about 0.4 cm (axial #112) and another semi-solid  density much more inferiorly near the right costophrenic angle measuring about  0.6 cm (axial #168). Another elongated 0.4 cm density at the right lung base  (axial #177). - Left lower lobe 0.3 cm nodule (axial #93) and 0.3 cm nodule (axial #150). - Fairly extensive centrilobular emphysematous changes.   - No interstitial fibrosis. - No significant pleural effusion. Mediastinum:  No mediastinal adenopathy. Cardiovascular:  No acute abnormalities involving the aorta. Mild to moderate  atheromatous plaque calcifications in the coronary arteries. Base of Neck:  No adenopathy. Axillae:  No adenopathy. Esophagus:  Unremarkable     Upper Abdomen:  No acute abnormalities. Skeletal Structures:  No acute abnormalities. Impression IMPRESSION:     1.  Multiple lung nodules as described. Recommend followup CT in 6 months. 2.  Emphysematous changes. Lung-RADS Category:  3. Probably benign. Management Recommendation:  Recommend 6 month followup low dose CT. Trinh Davis      Patient Active Problem List   Diagnosis Code    Anxiety F41.9    Asthma J45.909    Osteoporosis M81.0    Allergic rhinitis J30.9    Stage 3 severe COPD by GOLD classification ([address] J44.9    Examination of eyes and vision Z01.00    Bilateral shoulder pain M25.511, M25.512    SOB (shortness of breath) R06.02    Atelectasis J98.11    Pharyngoesophageal dysphagia R13.14    Personal history of smoking Z87.891       Assessment:  COPD GOLD-stage III  CAT score more than 10      IMPRESSION:   · COPD-moderate to severe with FEV1 53% predicted on PFTs from 6/18/2019. Functional group C. Overall well compensated but limited due to dyspnea and would benefit from fine-tuning treatment  · Dysphagia-no overt aspiration noted on barium swallow  · Personal history of smoking-candidate for LD CT-lung RADS 3 with multiple small subcentimeter nodules which need to be followed  · Anxiety  · Hypertension      RECOMMENDATIONS:   · Discussed with patient and daughter at length current diagnosis, pathophysiology and action plan based on available information so far  · Since Anoro has not been effective and patient is intolerant to inhaled corticosteroids will trial combination of Brovana/Perforomist and Spiriva Respimat as maintenance therapy with albuterol for rescue  · We will monitor response to above and make further adjustments  · Follow-up CT scan 6 months  · We will consider referring back to pulmonary rehab  · Preventive vaccinations  · Continue maintenance therapies for COPD  · Address triggers, lifestyle and behavioral changes  · Healthy diet healthy weight  · Maintain active lifestyle  · Follow-up in 2 months     Health maintenance screens deferred to Primary care provider.      Myra Nichols MD

## 2020-10-08 NOTE — TELEPHONE ENCOUNTER
[employer] appears to cover Incruse. Dr. Juany Rosario wishes the pt. To use the Spiriva Respimat sample and then call and let her know if, the MDI is effective and then she will  place an order for Incruse to the mail order pharmacy. The pt. Is notified of same.

## 2020-10-08 NOTE — PROGRESS NOTES
Henrry Overton presents today for   Chief Complaint   Patient presents with    Results     LDCT 10/01/20 & Swallow Eval 10/06/2020    Cough     no new or worsening       Is someone accompanying this pt? Yes Daughter    Is the patient using any DME equipment during [address]? No    -DME Company None    Depression Screening:  3 most recent PHQ Screens 9/15/2020   Little interest or pleasure in doing things Not at all   Feeling down, depressed, irritable, or hopeless Not at all   Total Score PHQ 2 0       Learning Assessment:  Learning Assessment 2/11/2014   PRIMARY LEARNER Patient   HIGHEST LEVEL OF EDUCATION - PRIMARY LEARNER  GRADUATED HIGH SCHOOL OR GED   BARRIERS PRIMARY LEARNER NONE   CO-LEARNER CAREGIVER No   PRIMARY LANGUAGE ENGLISH    NEED No   LEARNER PREFERENCE PRIMARY DEMONSTRATION     -     -   LEARNING SPECIAL TOPICS no   ANSWERED BY patient   RELATIONSHIP SELF       Abuse Screening:  No flowsheet data found. Fall Risk  Fall Risk Assessment, last 12 mths 9/15/2020   Able to walk? Yes   Fall in past 12 months? No         Coordination of Care:  1. Have you been to the ER, urgent care clinic since your last visit? Hospitalized since your last visit? No    2. Have you seen or consulted any other health care providers outside of the [address] [place] [address] since your last visit? Include any pap smears or colon screening.  No

## 2020-10-08 NOTE — TELEPHONE ENCOUNTER
Romy Chavez from [facility]([phone]). She has three questions regarding the Perforomist prescription. Please call back.

## 2020-10-13 RX ORDER — FORMOTEROL FUMARATE DIHYDRATE 20 UG/2ML
20 SOLUTION RESPIRATORY (INHALATION) ONCE
Qty: 20 EACH | Refills: 0 | Status: SHIPPED | COMMUNITY
Start: 2020-10-13 | End: 2020-10-13

## 2020-10-15 NOTE — PROGRESS NOTES
[facility]  [address], [place]   Phone: [phone]  Fax: [phone]        PROGRESS NOTE    CONSENT:  Pursuant to the emergency declaration under the [address] and [facility] 1135 waiver authority and the [employer] and [employer] Act, this Virtual Visit was conducted, with patient's consent, to reduce the patient's risk of exposure to COVID-19 and provide continuity of care for an established patient. Services were unable to be provided through a video synchronous discussion virtually to substitute for in-person appointment. Subsequently, the patient was consulted through a telephone discussion. ENCOUNTER DURATION: 7 minutes 23 seconds      HISTORY OF PRESENT ILLNESS:  The patient is a 66 y.o. female and is being consulted via telephone at the [facility] office for follow up of progressive, intermittent low back pain into the LLE radiating to the ankle x 8/2019 without trauma. Her pain is exacerbated with walking. Positive shopping cart sign. She has completed MDP without benefit. She is currently on Cymbalta 90 mg secondary to depression. Patient failed NEURONTIN 800 mg TID secondary to no relief. Pt underwent left L5 and S1 SNRB on 5/28/2020 with good relief. Pt denies change in bowel or bladder habits. Pt denies fever, weight loss, or skin changes. Pt denies any signs of weakness. Patient denies previous spinal surgery or physical therapy/chiropractic care. Pt denies h/o chemotherapy, gastric bypass, alcohol abuse, or DM. PmHx of depression. Note from Dr. Olivia Sin patient was seen with c/o pain and burning in the left leg. Indicated patient was on Cymbalta 60 mg and Neurontin 300 mg BID at that time. Note from Dr. Denman Hodgkin patient reports no change since last visit. Indicated she endorsed better benefit with the qhs dose of Neurontin. Indicated they increased her Neurontin to 600 mg TID and started her on a MDP at that time. Note from Dr. Deepa Escobedo dated 7/9/2020 indicating patient was seen with c/o right shoulder pain. Her pain is worse with overhead activity and at night. Indicated she has OA of the right shoulder. L Spine MRI dated 2/10/2020 films reviewed. Per report, grade 1 anterolistheses at L4-5 and L5-S1. Mild spinal canal stenosis at L2-3. Moderate spinal canal stenoses from L3-4 to L5-S1. Mild foraminal stenoses at L3-4 and L4-5. Moderate to severe bilateral foraminal stenoses at L5-S1 with potential L5 nerve root compression. Complex left renal cyst with fluid-hemorrhage level. Other small simple renal cysts.  LLE EMG dated 1/17/2020 suggested: sensorimotor peripheral neuropathy and chronic L5 and S1 radiculopathy. At her last clinical appointment, she was not interested in another block at the time. I offered PT, pt deferred due to transportation complications at the time. I weaned her off of Neurontin 800 mg TID. I tried her on Lyrica 50 mg BID. At today's telephone consultation, the patient reports pain location and distribution remains unchanged. She rates her pain 0-1/10, previously 1-6/10. Her pain is more intermittent in nature. She weaned off of the Neurontin 800 mg TID. Pt is tolerating the Lyrica 50 mg BID with good benefit. Pt denies change in bowel or bladder habits. Pt denies any signs of weakness.  reviewed. There is no height or weight on file to calculate BMI.     PCP: Yuni Whitley MD      Past Medical History:   Diagnosis Date    Allergic rhinitis     on allergy shots    Anemia     Asthma     Borderline diabetic     Cataract     Chronic lung disease     Cigarette smoker     abuse quit 2006    COPD (chronic obstructive pulmonary disease) ([facility] [address]     Degenerative arthritis     both shoulders    Depression     Easy bruising     Hypertension     Nervousness     Osteoporosis 10/27/99    Personal history of smoking 9/10/2020    Pharyngoesophageal dysphagia 9/10/2020        Social History     Socioeconomic History    Marital status:      Spouse name: Not on file    Number of children: Not on file    Years of education: Not on file    Highest education level: Not on file   Occupational History    Not on file   Social Needs    Financial resource strain: Not on file    Food insecurity     Worry: Not on file     Inability: Not on file    Transportation needs     Medical: Not on file     Non-medical: Not on file   Tobacco Use    Smoking status: Former Smoker     Packs/day: 2.50     Years: 45.00     Pack years: 112.50     Types: Cigarettes     Start date: 9/10/1959     Last attempt to quit: 2006     Years since quittin.8    Smokeless tobacco: Never Used    Tobacco comment: smoked for 50 years   Substance and Sexual Activity    Alcohol use: Yes     Alcohol/week: 2.0 - 3.0 standard drinks     Types: 2 - 3 Standard drinks or equivalent per week     Comment: occ    Drug use: No    Sexual activity: Not Currently   Lifestyle    Physical activity     Days per week: Not on file     Minutes per session: Not on file    Stress: Not on file   Relationships    Social connections     Talks on phone: Not on file     Gets together: Not on file     Attends Cheondoism service: Not on file     Active member of club or organization: Not on file     Attends meetings of clubs or organizations: Not on file     Relationship status: Not on file    Intimate partner violence     Fear of current or ex partner: Not on file     Emotionally abused: Not on file     Physically abused: Not on file     Forced sexual activity: Not on file   Other Topics Concern    Not on file   Social History Narrative    Not on file       Current Outpatient Medications   Medication Sig Dispense Refill    tiotropium bromide (Spiriva Respimat) 2.5 mcg/actuation inhaler Take 2 Puffs by inhalation daily.  1 Inhaler 3    tiotropium bromide (Spiriva Respimat) 2.5 mcg/actuation inhaler Take 2 Puffs by inhalation daily. 1 Inhaler 0    formoterol (Perforomist) 20 mcg/2 mL nebu neb solution 2 mL by Nebulization route once over twenty-four (24) hours. Diag. Code: J44.9, J45.20, R06.02  File under Medicare B 30 Each 0    arformoteroL (Brovana) 15 mcg/2 mL nebu neb solution 2 mL by Nebulization route two (2) times a day. J44.9. Indications: severe copd 60 Vial 3    albuterol sulfate (PROVENTIL;VENTOLIN) 2.5 mg/0.5 mL nebu nebulizer solution 2.5 mg by Nebulization route every four (4) hours as needed for Wheezing or Shortness of Breath. [employer]      pregabalin (LYRICA) 50 mg capsule Take 1 Cap by mouth two (2) times a day. Max Daily Amount: 100 mg. 60 Cap 1    meloxicam (MOBIC) 15 mg tablet Take 15 mg by mouth as needed.  DULoxetine (CYMBALTA) 20 mg capsule Take 90 mg by mouth daily.  lisinopril (PRINIVIL, ZESTRIL) 10 mg tablet TAKE 1 TABLET BY MOUTH DAILY 30 Tab 0    sertraline (ZOLOFT) 100 mg tablet TAKE 1 TABLET BY MOUTH EVERY DAY 30 Tab 6    albuterol (VENTOLIN HFA) 90 mcg/actuation inhaler Take 2 Puffs by inhalation every four (4) hours as needed for Wheezing. 1 Inhaler 2    gabapentin (Neurontin) 400 mg capsule Take 1 cap tid x 1 week, then 1 cap bid x 1 week, then 1 cap every day x 1 week and then stop 42 Cap 0    pregabalin (LYRICA) 50 mg capsule Take 1 Cap by mouth two (2) times a day. Max Daily Amount: 100 mg. 60 Cap 1    pravastatin (PRAVACHOL) 20 mg tablet       gabapentin (NEURONTIN) 100 mg capsule 600 mg.  cyclobenzaprine (FLEXERIL) 5 mg tablet Take 5 mg by mouth three (3) times daily as needed for Muscle Spasm(s).  diazepam (VALIUM) 5 mg tablet Take 1 Tab by mouth two (2) times daily as needed for Anxiety.  (Patient not taking: Reported on 8/27/2020) 60 Tab 2       Allergies   Allergen Reactions    Qvar [Beclomethasone Dipropionate] Shortness of Breath    Entex [Phenylephrine-Guaifenesin] Other (comments)     intolerance    Sulfa (Sulfonamide Antibiotics) Other (comments)          PHYSICAL EXAMINATION  Unable to perform examination secondary to COVID-19. CONSTITUTIONAL: NAD, A&O x 3    ASSESSMENT   Diagnoses and all orders for this visit:    1. Lumbosacral spondylosis without myelopathy  -     pregabalin (LYRICA) 50 mg capsule; Take 1 Cap by mouth two (2) times a day. Max Daily Amount: 100 mg.    2. Spinal stenosis of lumbar region, unspecified whether neurogenic claudication present  -     pregabalin (LYRICA) 50 mg capsule; Take 1 Cap by mouth two (2) times a day. Max Daily Amount: 100 mg.    3. Lumbar neuritis  -     pregabalin (LYRICA) 50 mg capsule; Take 1 Cap by mouth two (2) times a day. Max Daily Amount: 100 mg.    4. DDD (degenerative disc disease), lumbar  -     pregabalin (LYRICA) 50 mg capsule; Take 1 Cap by mouth two (2) times a day. Max Daily Amount: 100 mg.    5. Idiopathic peripheral neuropathy  -     pregabalin (LYRICA) 50 mg capsule; Take 1 Cap by mouth two (2) times a day. Max Daily Amount: 100 mg.    6. Spondylolisthesis, acquired  -     pregabalin (LYRICA) 50 mg capsule; Take 1 Cap by mouth two (2) times a day. Max Daily Amount: 100 mg. IMPRESSION AND PLAN:  The patient consented to the tele health visit and was aware that there would be a charge. During today's telephone consultation patient had c/o low back pain into the LLE radiating to the ankle. Multiple treatment options were discussed. Patient wished to continue her current treatment. I provided her refills of Lyrica 50 mg BID. Pt appears to be neurologically intact. I will see the patient back in 3 month's time or earlier if needed. Written by Meryle Kling, as dictated by Milton Lao MD  I examined the patient, reviewed and agree with the note.

## 2020-10-16 ENCOUNTER — VIRTUAL VISIT (OUTPATIENT)
Dept: ORTHOPEDIC SURGERY | Age: 75
End: 2020-10-16
Payer: MEDICARE

## 2020-10-16 DIAGNOSIS — G60.9 IDIOPATHIC PERIPHERAL NEUROPATHY: ICD-10-CM

## 2020-10-16 DIAGNOSIS — M54.16 LUMBAR NEURITIS: ICD-10-CM

## 2020-10-16 DIAGNOSIS — M48.061 SPINAL STENOSIS OF LUMBAR REGION, UNSPECIFIED WHETHER NEUROGENIC CLAUDICATION PRESENT: ICD-10-CM

## 2020-10-16 DIAGNOSIS — M47.817 LUMBOSACRAL SPONDYLOSIS WITHOUT MYELOPATHY: Primary | ICD-10-CM

## 2020-10-16 DIAGNOSIS — M51.36 DDD (DEGENERATIVE DISC DISEASE), LUMBAR: ICD-10-CM

## 2020-10-16 DIAGNOSIS — M43.10 SPONDYLOLISTHESIS, ACQUIRED: ICD-10-CM

## 2020-10-16 PROCEDURE — G8419 CALC BMI OUT NRM PARAM NOF/U: HCPCS | Performed by: PHYSICAL MEDICINE & REHABILITATION

## 2020-10-16 PROCEDURE — G8536 NO DOC ELDER MAL SCRN: HCPCS | Performed by: PHYSICAL MEDICINE & REHABILITATION

## 2020-10-16 PROCEDURE — 99441 PR PHYS/QHP TELEPHONE EVALUATION 5-10 MIN: CPT | Performed by: PHYSICAL MEDICINE & REHABILITATION

## 2020-10-16 PROCEDURE — G8432 DEP SCR NOT DOC, RNG: HCPCS | Performed by: PHYSICAL MEDICINE & REHABILITATION

## 2020-10-16 PROCEDURE — G8428 CUR MEDS NOT DOCUMENT: HCPCS | Performed by: PHYSICAL MEDICINE & REHABILITATION

## 2020-10-16 PROCEDURE — 1101F PT FALLS ASSESS-DOCD LE1/YR: CPT | Performed by: PHYSICAL MEDICINE & REHABILITATION

## 2020-10-16 PROCEDURE — 1090F PRES/ABSN URINE INCON ASSESS: CPT | Performed by: PHYSICAL MEDICINE & REHABILITATION

## 2020-10-16 RX ORDER — PREGABALIN 50 MG/1
50 CAPSULE ORAL 2 TIMES DAILY
Qty: 180 CAP | Refills: 0 | Status: SHIPPED | OUTPATIENT
Start: 2020-10-16 | End: 2022-05-27

## 2020-10-20 ENCOUNTER — TELEPHONE (OUTPATIENT)
Dept: PULMONOLOGY | Age: 75
End: 2020-10-20

## 2020-10-20 NOTE — TELEPHONE ENCOUNTER
Daughter calling re Incruse rx. The patient would like a sample for the Incruse before she pays for it if possible.

## 2020-10-20 NOTE — TELEPHONE ENCOUNTER
Patient used the sample of Spiriva and patient feels it is helping. Spiriva is not covered by doctor wanted her to use the sample to make sure it helped before changing to Incruse which shows is covered medication.

## 2020-10-20 NOTE — TELEPHONE ENCOUNTER
Pt's daughter RIMMA([phone]). Wants to talk to [facility] regarding medications. Please call back.

## 2020-10-21 RX ORDER — UMECLIDINIUM 62.5 UG/1
1 AEROSOL, POWDER ORAL DAILY
Qty: 2 INHALER | Refills: 0 | COMMUNITY
Start: 2020-10-21

## 2020-10-21 NOTE — TELEPHONE ENCOUNTER
Patient called back stating she thinks the Perforomist is making her chest tight and is going to stop it. She has not picked up the Incurse due to $70 cost and wants to try first before buying. The samples are pended.

## 2020-11-25 ENCOUNTER — TELEPHONE (OUTPATIENT)
Dept: ORTHOPEDIC SURGERY | Age: 75
End: 2020-11-25

## 2020-11-25 NOTE — TELEPHONE ENCOUNTER
Patients daughter, Marthaalana Butcherklarissa (ok per HIPAA), reports that recent change to patients Lyrica is not effective for her pain. A follow up appointment was scheduled for 12/3, however, Martha Worrell is concerned about mom's pain, are we able to change her med dosage until seen? Please advise Martha Worrell or patient at [phone] or  [phone].

## 2020-11-30 NOTE — PROGRESS NOTES
[facility]  [address], [address] [place]   Phone: [phone]  Fax: [phone]        PROGRESS NOTE    CONSENT:  Pursuant to the emergency declaration under the [address] and [facility] 1135 waiver authority and the [employer] and [employer] Act, this Virtual Visit was conducted, with patient's consent, to reduce the patient's risk of exposure to COVID-19 and provide continuity of care for an established patient. Services were unable to be provided through a video synchronous discussion virtually to substitute for in-person appointment. Subsequently, the patient was consulted through a telephone discussion. ENCOUNTER DURATION : 11 minutes 55 seconds      HISTORY OF PRESENT ILLNESS:  The patient is a 66 y.o. female and is being consulted via telephone at the [facility] office for follow up of progressive, intermittent low back pain into the LLE radiating to the ankle x 8/2019 without trauma. Her pain is exacerbated with walking. Positive shopping cart sign. She has completed MDP without benefit. She is currently on Cymbalta 90 mg secondary to depression. Patient failed NEURONTIN 800 mg TID secondary to no relief. Pt underwent left L5 and S1 SNRB on 5/28/2020 with good relief. Pt denies change in bowel or bladder habits. Pt denies fever, weight loss, or skin changes. Pt denies any signs of weakness. Patient denies previous spinal surgery or physical therapy/chiropractic care. Pt denies h/o chemotherapy, gastric bypass, alcohol abuse, or DM. PmHx of depression. Note from Dr. Charly Elizalde patient was seen with c/o pain and burning in the left leg. Indicated patient was on Cymbalta 60 mg and Neurontin 300 mg BID at that time. Note from Dr. Saniya Colunga patient reports no change since last visit.  Indicated she endorsed better benefit with the qhs dose of Neurontin. Indicated they increased her Neurontin to 600 mg TID and started her on a MDP at that time. Note from Dr. Prince Wiseman 7/9/2020 indicating patient was seen with c/o right shoulder pain. Her pain is worse with overhead activity and at night. Indicated she has OA of the right shoulder. L Spine MRI dated 2/10/2020 films reviewed. Per report, grade 1 anterolistheses at L4-5 and L5-S1. Mild spinal canal stenosis at L2-3. Moderate spinal canal stenoses from L3-4 to L5-S1. Mild foraminal stenoses at L3-4 and L4-5. Moderate to severe bilateral foraminal stenoses at L5-S1 with potential L5 nerve root compression. Complex left renal cyst with fluid-hemorrhage level. Other small simple renal cysts.  LLE EMG dated 1/17/2020 suggested: sensorimotor peripheral neuropathy and chronic L5 and S1 radiculopathy. At her last clinical appointment, patient wished to continue her current treatment. I provided her refills of Lyrica 50 mg BID.     At today's telephone consultation, the patient c/o left-sided lower back pain radiating into the LLE in a S1 distribution to the ankle. She rates her pain 0-8/10, previously 0-1/10. Her pain is exacerbated by walking and relieved with sitting. This is an earlier than planned f/u. Pt had an increase in pain 1.5 weeks ago without trauma. Pt contacted my office and her Lyrica was increase from 50 mg BID to 100 mg BID on 12/1/2020. She is tolerating the increased dose of Lyrica 100 mg BID. Pt denies change in bowel or bladder habits. Pt denies any signs of weakness.  reviewed. There is no height or weight on file to calculate BMI.     PCP: Verito Foley MD      Past Medical History:   Diagnosis Date    Allergic rhinitis     on allergy shots    Anemia     Asthma     Borderline diabetic     Cataract     Chronic lung disease     Cigarette smoker     abuse quit 2006    COPD (chronic obstructive pulmonary disease) ([facility])     Degenerative arthritis     both shoulders    Depression     Easy bruising     Hypertension     Nervousness     Osteoporosis 10/27/99    Personal history of smoking 9/10/2020    Pharyngoesophageal dysphagia 9/10/2020        Social History     Socioeconomic History    Marital status:      Spouse name: Not on file    Number of children: Not on file    Years of education: Not on file    Highest education level: Not on file   Occupational History    Not on file   Social Needs    Financial resource strain: Not on file    Food insecurity     Worry: Not on file     Inability: Not on file    Transportation needs     Medical: Not on file     Non-medical: Not on file   Tobacco Use    Smoking status: Former Smoker     Packs/day: 2.50     Years: 45.00     Pack years: 112.50     Types: Cigarettes     Start date: 9/10/1959     Last attempt to quit: 2006     Years since quittin.9    Smokeless tobacco: Never Used    Tobacco comment: smoked for 50 years   Substance and Sexual Activity    Alcohol use:  Yes     Alcohol/week: 2.0 - 3.0 standard drinks     Types: 2 - 3 Standard drinks or equivalent per week     Comment: occ    Drug use: No    Sexual activity: Not Currently   Lifestyle    Physical activity     Days per week: Not on file     Minutes per session: Not on file    Stress: Not on file   Relationships    Social connections     Talks on phone: Not on file     Gets together: Not on file     Attends Mandaeism service: Not on file     Active member of club or organization: Not on file     Attends meetings of clubs or organizations: Not on file     Relationship status: Not on file    Intimate partner violence     Fear of current or ex partner: Not on file     Emotionally abused: Not on file     Physically abused: Not on file     Forced sexual activity: Not on file   Other Topics Concern    Not on file   Social History Narrative    Not on file       Current Outpatient Medications   Medication Sig Dispense Refill    umeclidinium John Sosa ELLIPTA) 62.5 mcg/actuation inhaler Take 1 Puff by inhalation daily. 2 Inhaler 0    pregabalin (LYRICA) 50 mg capsule Take 1 Cap by mouth two (2) times a day. Max Daily Amount: 100 mg. (Patient taking differently: Take 100 mg by mouth two (2) times a day.) 180 Cap 0    formoterol (Perforomist) 20 mcg/2 mL nebu neb solution 2 mL by Nebulization route once over twenty-four (24) hours. Diag. Code: J44.9, J45.20, R06.02  File under Medicare B 30 Each 0    arformoteroL (Brovana) 15 mcg/2 mL nebu neb solution 2 mL by Nebulization route two (2) times a day. J44.9. Indications: severe copd 60 Vial 3    albuterol sulfate (PROVENTIL;VENTOLIN) 2.5 mg/0.5 mL nebu nebulizer solution 2.5 mg by Nebulization route every four (4) hours as needed for Wheezing or Shortness of Breath. [employer]      meloxicam (MOBIC) 15 mg tablet Take 15 mg by mouth as needed.  DULoxetine (CYMBALTA) 20 mg capsule Take 90 mg by mouth daily.  lisinopril (PRINIVIL, ZESTRIL) 10 mg tablet TAKE 1 TABLET BY MOUTH DAILY 30 Tab 0    sertraline (ZOLOFT) 100 mg tablet TAKE 1 TABLET BY MOUTH EVERY DAY 30 Tab 6    albuterol (VENTOLIN HFA) 90 mcg/actuation inhaler Take 2 Puffs by inhalation every four (4) hours as needed for Wheezing. 1 Inhaler 2    gabapentin (Neurontin) 400 mg capsule Take 1 cap tid x 1 week, then 1 cap bid x 1 week, then 1 cap every day x 1 week and then stop (Patient not taking: Reported on 12/2/2020) 42 Cap 0    pregabalin (LYRICA) 50 mg capsule Take 1 Cap by mouth two (2) times a day. Max Daily Amount: 100 mg. 60 Cap 1    pregabalin (LYRICA) 50 mg capsule Take 1 Cap by mouth two (2) times a day. Max Daily Amount: 100 mg. 60 Cap 1    pravastatin (PRAVACHOL) 20 mg tablet       gabapentin (NEURONTIN) 100 mg capsule 600 mg.  cyclobenzaprine (FLEXERIL) 5 mg tablet Take 5 mg by mouth three (3) times daily as needed for Muscle Spasm(s).       diazepam (VALIUM) 5 mg tablet Take 1 Tab by mouth two (2) times daily as needed for Anxiety. (Patient not taking: Reported on 8/27/2020) 60 Tab 2       Allergies   Allergen Reactions    Qvar [Beclomethasone Dipropionate] Shortness of Breath    Entex [Phenylephrine-Guaifenesin] Other (comments)     intolerance    Sulfa (Sulfonamide Antibiotics) Other (comments)          PHYSICAL EXAMINATION  Unable to perform examination secondary to COVID-19. CONSTITUTIONAL: NAD, A&O x 3    ASSESSMENT   Diagnoses and all orders for this visit:    1. Lumbosacral spondylosis without myelopathy  -     pregabalin (LYRICA) 100 mg capsule; Take 1 Cap by mouth two (2) times a day. Max Daily Amount: 200 mg.    2. Spinal stenosis of lumbar region, unspecified whether neurogenic claudication present  -     pregabalin (LYRICA) 100 mg capsule; Take 1 Cap by mouth two (2) times a day. Max Daily Amount: 200 mg.    3. Lumbar neuritis  -     pregabalin (LYRICA) 100 mg capsule; Take 1 Cap by mouth two (2) times a day. Max Daily Amount: 200 mg.    4. DDD (degenerative disc disease), lumbar  -     pregabalin (LYRICA) 100 mg capsule; Take 1 Cap by mouth two (2) times a day. Max Daily Amount: 200 mg.    5. Idiopathic peripheral neuropathy  -     pregabalin (LYRICA) 100 mg capsule; Take 1 Cap by mouth two (2) times a day. Max Daily Amount: 200 mg.    6. Spondylolisthesis, acquired  -     pregabalin (LYRICA) 100 mg capsule; Take 1 Cap by mouth two (2) times a day. Max Daily Amount: 200 mg. IMPRESSION AND PLAN:  The patient consented to the tele health visit and was aware that there would be a charge. During today's telephone consultation patient had c/o  left-sided lower back pain radiating into the LLE in a S1 distribution to the ankle. Multiple treatment options were discussed. I provided her refills of Lyrica 100 mg BID. I offered another injection, pt declined. Pt appears to be neurologically intact. I will see the patient back in 1 month's time or earlier if needed.      Written by Mercedes Bolton, as dictated by Nancy Fontanez MD  I examined the patient, reviewed and agree with the note.

## 2020-12-02 ENCOUNTER — VIRTUAL VISIT (OUTPATIENT)
Dept: ORTHOPEDIC SURGERY | Age: 75
End: 2020-12-02
Payer: MEDICARE

## 2020-12-02 DIAGNOSIS — M47.817 LUMBOSACRAL SPONDYLOSIS WITHOUT MYELOPATHY: Primary | ICD-10-CM

## 2020-12-02 DIAGNOSIS — M54.16 LUMBAR NEURITIS: ICD-10-CM

## 2020-12-02 DIAGNOSIS — M48.061 SPINAL STENOSIS OF LUMBAR REGION, UNSPECIFIED WHETHER NEUROGENIC CLAUDICATION PRESENT: ICD-10-CM

## 2020-12-02 DIAGNOSIS — G60.9 IDIOPATHIC PERIPHERAL NEUROPATHY: ICD-10-CM

## 2020-12-02 DIAGNOSIS — M43.10 SPONDYLOLISTHESIS, ACQUIRED: ICD-10-CM

## 2020-12-02 DIAGNOSIS — M51.36 DDD (DEGENERATIVE DISC DISEASE), LUMBAR: ICD-10-CM

## 2020-12-02 PROCEDURE — 1101F PT FALLS ASSESS-DOCD LE1/YR: CPT | Performed by: PHYSICAL MEDICINE & REHABILITATION

## 2020-12-02 PROCEDURE — G8427 DOCREV CUR MEDS BY ELIG CLIN: HCPCS | Performed by: PHYSICAL MEDICINE & REHABILITATION

## 2020-12-02 PROCEDURE — G8432 DEP SCR NOT DOC, RNG: HCPCS | Performed by: PHYSICAL MEDICINE & REHABILITATION

## 2020-12-02 PROCEDURE — 99442 PR PHYS/QHP TELEPHONE EVALUATION 11-20 MIN: CPT | Performed by: PHYSICAL MEDICINE & REHABILITATION

## 2020-12-02 PROCEDURE — G8419 CALC BMI OUT NRM PARAM NOF/U: HCPCS | Performed by: PHYSICAL MEDICINE & REHABILITATION

## 2020-12-02 PROCEDURE — G8536 NO DOC ELDER MAL SCRN: HCPCS | Performed by: PHYSICAL MEDICINE & REHABILITATION

## 2020-12-02 PROCEDURE — 1090F PRES/ABSN URINE INCON ASSESS: CPT | Performed by: PHYSICAL MEDICINE & REHABILITATION

## 2020-12-02 RX ORDER — PREGABALIN 100 MG/1
100 CAPSULE ORAL 2 TIMES DAILY
Qty: 60 CAP | Refills: 1 | Status: SHIPPED | OUTPATIENT
Start: 2020-12-02 | End: 2021-02-03

## 2020-12-08 ENCOUNTER — OFFICE VISIT (OUTPATIENT)
Dept: PULMONOLOGY | Age: 75
End: 2020-12-08
Payer: MEDICARE

## 2020-12-08 VITALS
OXYGEN SATURATION: 95 % | WEIGHT: 163.8 LBS | HEIGHT: 63 IN | RESPIRATION RATE: 18 BRPM | DIASTOLIC BLOOD PRESSURE: 76 MMHG | BODY MASS INDEX: 29.02 KG/M2 | HEART RATE: 98 BPM | SYSTOLIC BLOOD PRESSURE: 174 MMHG | TEMPERATURE: 97.3 F

## 2020-12-08 DIAGNOSIS — J44.9 STAGE 3 SEVERE COPD BY GOLD CLASSIFICATION (HCC): Primary | ICD-10-CM

## 2020-12-08 DIAGNOSIS — Z87.891 PERSONAL HISTORY OF SMOKING: ICD-10-CM

## 2020-12-08 DIAGNOSIS — J45.40 MODERATE PERSISTENT ASTHMA WITHOUT COMPLICATION: ICD-10-CM

## 2020-12-08 DIAGNOSIS — R06.02 SOB (SHORTNESS OF BREATH): ICD-10-CM

## 2020-12-08 PROCEDURE — G8432 DEP SCR NOT DOC, RNG: HCPCS | Performed by: INTERNAL MEDICINE

## 2020-12-08 PROCEDURE — G8536 NO DOC ELDER MAL SCRN: HCPCS | Performed by: INTERNAL MEDICINE

## 2020-12-08 PROCEDURE — 99215 OFFICE O/P EST HI 40 MIN: CPT | Performed by: INTERNAL MEDICINE

## 2020-12-08 PROCEDURE — G8419 CALC BMI OUT NRM PARAM NOF/U: HCPCS | Performed by: INTERNAL MEDICINE

## 2020-12-08 PROCEDURE — 1090F PRES/ABSN URINE INCON ASSESS: CPT | Performed by: INTERNAL MEDICINE

## 2020-12-08 PROCEDURE — G8427 DOCREV CUR MEDS BY ELIG CLIN: HCPCS | Performed by: INTERNAL MEDICINE

## 2020-12-08 PROCEDURE — 1101F PT FALLS ASSESS-DOCD LE1/YR: CPT | Performed by: INTERNAL MEDICINE

## 2020-12-08 NOTE — PROGRESS NOTES
Sukhwinder Lorenzo presents today for   Chief Complaint   Patient presents with    Cough    Shortness of Breath       Is someone accompanying this pt? Yes  Daughter    Is the patient using any DME equipment during [address]? No    -DME Company NA    Depression Screening:  3 most recent PHQ Screens 9/15/2020   Little interest or pleasure in doing things Not at all   Feeling down, depressed, irritable, or hopeless Not at all   Total Score PHQ 2 0       Learning Assessment:  Learning Assessment 2/11/2014   PRIMARY LEARNER Patient   HIGHEST LEVEL OF EDUCATION - PRIMARY LEARNER  GRADUATED HIGH SCHOOL OR GED   BARRIERS PRIMARY LEARNER NONE   CO-LEARNER CAREGIVER No   PRIMARY LANGUAGE ENGLISH    NEED No   LEARNER PREFERENCE PRIMARY DEMONSTRATION     -     -   LEARNING SPECIAL TOPICS no   ANSWERED BY patient   RELATIONSHIP SELF       Abuse Screening:  No flowsheet data found. Fall Risk  Fall Risk Assessment, last 12 mths 9/15/2020   Able to walk? Yes   Fall in past 12 months? No         Coordination of Care:  1. Have you been to the ER, urgent care clinic since your last visit? Hospitalized since your last visit? No    2. Have you seen or consulted any other health care providers outside of the [facility] since your last visit? Include any pap smears or colon screening.  No

## 2020-12-08 NOTE — PROGRESS NOTES
[facility]  Pulmonary, Critical Care, and Sleep Medicine      Pulmonary Office follow-up visit    Name: Hernan Calabrese     : 1945     Date: 2020        Subjective:   Patient has been referred for evaluation of: COPD. 20     Patient states that she felt significantly improved with nebulized Brovana-was able to sleep all night for the first time. However when she went to get her prescription the cost was prohibitive and therefore she asked for alternate therapy. After checking with her formulary it was noted that her performist discovered at at a lesser cost.   She tried the performist but did not tolerated. I gave her Incruse sample to try-again patient had difficulty with the Incruse with side effects. Eventually the pharmacist found some plan where she can get the Daylin Nissen at a lesser cost.  She is back using it and feeling much better. She is asking to add back Spiriva as she might be able to get it through patient assistant program.  Patient is here for follow-up visit accompanied with her daughter  Shortness of breath is improved with above interventions. More recently she did have increased cough with some wheezing and was prescribed prednisone taper with a course of antibiotics by her PCP. She is back to feeling at her baseline  She denies any chest pain  Denies fever night sweats  Denies any swelling of the lower extremities    HPI  Patient is a 66 y.o. female who is accompanied by her daughter and gives a history of being diagnosed with COPD around . Patient has been followed at [facility] pulmonology-Dr. Jcarlos Hale who recently left the area and therefore is seeking a new pulmonologist.  Sawyer Arrieta a history of having 3 pack a day smoking until about 14 years back when she quit. She has symptoms of shortness of breath on minimal exertion on a daily basis.   She mentions getting hot sweaty with any physical activity and is not able to lift any objects, bend stoop without getting out of breath. She paces her activities of daily living. Has not climbed a stair in long time as far she can remember. She has been on Anoro and recently was prescribed-nebulized Rayfield Reza but her insurance did not cover and therefore she is continued on the Anoro which she thinks is not helping and Dr. Leona Henriquez felt that patient's inhalation techniques were part of the issue. She denies any orthopnea or paroxysmal nocturnal dyspnea  Denies any pedal edema  Patient mentions having some cough-not very bothersome. Her daughter although contradicts and states that she has a daily cough and she gets easily choked on food. Patient states that usually if she eats crackers, kernels of corn rice she feels getting choked in her lower throat and starts coughing. She is also noticed this with several liquids. Has intermittent wheezing  Denies chest pain  She has not been on any home oxygen  Her pulmonary function tests were done about a year ago at [address] [place] [address]  She does not recall ever having a CT scan of her chest  Patient states that she is not able to tolerate inhaled corticosteroids  She has completed pulmonary rehab in 2016.     1 2 3 4 5   Cough  2      Mucus 1       Chest tightness 1       ADL's  2      Climb 1 flight stairs     5   Sleep 1       Energy level  2        Scale 1   2  3  4  5  Never to all the time    Or CAT> 10     Patient is a retired   Occupational exposure-none to any inhalational dust fumes or chemicals  Environmental exposures-has 2 dogs at home.   They do not shed  ILD history:  No Hx of connective tissue disease such as RA, Lupus, Scleroderma  Now Hx Raynauds  No Hx sarcoidosis  No Hx taking medications- methotrexate, Amiodarone, Nitrofurantoin  No Hx cancer, chemotherapy, radiation  No Hx Birds, chickens, farm animals    Review of data:  Testing:  CXR  CT aedy-[id number]  PFT-likely 6/18/2019  Echo-now  6 min walk-[place]    Vaccinations:  Pneumococcal  Influenza    DME:  Oxygen-no  Nebulizer-yes      Past Medical History:   Diagnosis Date    Allergic rhinitis     on allergy shots    Anemia     Asthma     Borderline diabetic     Cataract     Chronic lung disease     Cigarette smoker     abuse quit 2006    COPD (chronic obstructive pulmonary disease) ([facility] [address]     Degenerative arthritis     both shoulders    Depression     Easy bruising     Hypertension     Nervousness     Osteoporosis 10/27/99    Personal history of smoking 9/10/2020    Pharyngoesophageal dysphagia 9/10/2020       Past Surgical History:   Procedure Laterality Date    HX BACK SURGERY      nerve block    HX GYN  1974    hysterectomy    HX GYN  1970    D & C    HX HEENT  age 3    tonsillectomy    HX ORTHOPAEDIC  age 21    right leg tumor removed benign     Allergies   Allergen Reactions    Qvar [Beclomethasone Dipropionate] Shortness of Breath    Entex [Phenylephrine-Guaifenesin] Other (comments)     intolerance    Sulfa (Sulfonamide Antibiotics) Other (comments)     Current Outpatient Medications   Medication Sig Dispense Refill    pregabalin (LYRICA) 100 mg capsule Take 1 Cap by mouth two (2) times a day. Max Daily Amount: 200 mg. 60 Cap 1    arformoteroL (Brovana) 15 mcg/2 mL nebu neb solution 2 mL by Nebulization route two (2) times a day. J44.9. Indications: severe copd 60 Vial 3    meloxicam (MOBIC) 15 mg tablet Take 15 mg by mouth as needed.  DULoxetine (CYMBALTA) 20 mg capsule Take 90 mg by mouth daily.  lisinopril (PRINIVIL, ZESTRIL) 10 mg tablet TAKE 1 TABLET BY MOUTH DAILY 30 Tab 0    sertraline (ZOLOFT) 100 mg tablet TAKE 1 TABLET BY MOUTH EVERY DAY 30 Tab 6    albuterol (VENTOLIN HFA) 90 mcg/actuation inhaler Take 2 Puffs by inhalation every four (4) hours as needed for Wheezing. 1 Inhaler 2    umeclidinium (INCRUSE ELLIPTA) 62.5 mcg/actuation inhaler Take 1 Puff by inhalation daily.  2 Inhaler 0    pregabalin (LYRICA) 50 mg capsule Take 1 Cap by mouth two (2) times a day. Max Daily Amount: 100 mg. (Patient taking differently: Take 100 mg by mouth two (2) times a day.) 180 Cap 0    formoterol (Perforomist) 20 mcg/2 mL nebu neb solution 2 mL by Nebulization route once over twenty-four (24) hours. Diag. Code: J44.9, J45.20, R06.02  File under Medicare B 30 Each 0    albuterol sulfate (PROVENTIL;VENTOLIN) 2.5 mg/0.5 mL nebu nebulizer solution 2.5 mg by Nebulization route every four (4) hours as needed for Wheezing or Shortness of Breath. [employer]      gabapentin (Neurontin) 400 mg capsule Take 1 cap tid x 1 week, then 1 cap bid x 1 week, then 1 cap every day x 1 week and then stop (Patient not taking: Reported on 12/2/2020) 42 Cap 0    pregabalin (LYRICA) 50 mg capsule Take 1 Cap by mouth two (2) times a day. Max Daily Amount: 100 mg. 60 Cap 1    pregabalin (LYRICA) 50 mg capsule Take 1 Cap by mouth two (2) times a day. Max Daily Amount: 100 mg. 60 Cap 1    pravastatin (PRAVACHOL) 20 mg tablet       gabapentin (NEURONTIN) 100 mg capsule 600 mg.  cyclobenzaprine (FLEXERIL) 5 mg tablet Take 5 mg by mouth three (3) times daily as needed for Muscle Spasm(s).  diazepam (VALIUM) 5 mg tablet Take 1 Tab by mouth two (2) times daily as needed for Anxiety.  (Patient not taking: Reported on 8/27/2020) 60 Tab 2     Review of Systems:  HEENT: No epistaxis, no nasal drainage, no difficulty in swallowing, no redness in eyes  Respiratory: as above  Cardiovascular: no chest pain, no palpitations, no chronic leg edema, no syncope  Gastrointestinal: no abd pain, no vomiting, no diarrhea, no bleeding symptoms  Genitourinary: No urinary symptoms or hematuria  Integument/breast: No ulcers or rashes  Musculoskeletal:Neg  Neurological: No focal weakness, no seizures, no headaches  Behvioral/Psych: No anxiety, no depression  Constitutional: No fever, no chills, no weight loss, no night sweats     Objective:     Visit Vitals  BP (!) 174/76 (BP 1 Location: Right arm, BP Patient Position: Sitting)   Pulse 98   Temp 97.3 °F (36.3 °C) (Oral)   Resp 18   Ht 5' 3\" (1.6 m)   Wt 74.3 kg (163 lb 12.8 oz)   LMP 01/01/1974   SpO2 95% Comment: RA Rest   BMI 29.02 kg/m²        Physical Exam:   General: comfortable, no acute distress  HEENT: pupils reactive, sclera anicteric, EOM intact  Neck: No adenopathy or thyroid swelling, no lymphadenopathy or JVD, supple  CVS: S1S2 no murmurs  RS: Mod AE bilaterally, no tactile fremitus or egophony, no accessory muscle use  Abd: soft, non tender, no hepatosplenomegaly  Neuro: non focal, awake, alert  Extrm: no leg edema, clubbing or cyanosis  Skin: no rash    Data review:   Pertinent labs: CBC, BMP, LFT's    PFT:    Date FVC FEV1  FEV1/FVC TIX03-24 TLC RV RV/TLC VC DLCO   6/18/2019  81  53  64  25  106  149  140  69  48/73                                         6 min walk test; madeline SaO2 89%      No results found for this or any previous visit. Imaging:  I have personally reviewed the patients radiographs and have reviewed the reports:  XR Results (most recent):  Results from Hospital Encounter encounter on 10/06/20   XR SWALLOW FUNC VIDEO    Narrative MODIFIED BARIUM SWALLOW. CLINICAL INDICATION/HISTORY: Dysphagia. Feeding difficulties. Intermittent  choking when eating. COMPARISON: None. TECHNIQUE: A live videoradiographic swallowing function study was performed in  conjunction with the speech therapist.  The video is available in the department  for review by speech pathology and ancillary staff. Fluoroscopic images were not made available in PACS for radiologist review and  this report is strictly a procedural documentation by the physician assistant  with input from speech pathology.   It is not considered inclusive or exclusive  of anatomic abnormalities and is not diagnostic beyond the specific  considerations regarding swallowing function as it relates to airway protection  while eating and drinking. Fluoroscopy time: 48 seconds     Fluoroscopic images: 0    Electronic capture cine loops: 5    FINDINGS:    Patient swallowed multiple consistencies of barium mixtures including thin  liquids, pudding, solids and barium tablet challenge. There was no kathe penetration or aspiration with all tested consistencies. Piriform residuals were seen with solid consistency. The patient was unable to swallow a barium tablet. Impression IMPRESSION:    No kathe penetration or aspiration with all tested consistencies. Please see speech pathologist report for additional details and recommendations. CT Results (most recent):  Results from Hospital Encounter encounter on 10/01/20   CT LOW DOSE LUNG CANCER SCREENING    Narrative EXAM:  CT Chest without Contrast -- Low Dose Screening CT. CLINICAL INDICATION:     - Screening.  - Meets the criteria, i.e. 120 pack years smoking (40 yrs x 3 ppd),  asymptomatic. COMPARISON:  None. TECHNIQUE:      Low dose unenhanced multislice helical CT is performed from the thoracic inlet  to the adrenal glands without intravenous contrast administration. Contiguous  axial images were reconstructed and lung and soft tissue windows were generated. Coronal and sagittal reformations were also generated. Dose reduction techniques are used, including automated exposure control,  adjustment of the mAs and/or kVp according to patient size, standardized  low-dose protocol, and/or iterative reconstruction technique. FINDINGS:    Technique Assessment:  The overall quality of the study is adequate for  diagnostic review. Lungs, Pleura:    - 0.2 cm nodule in the right upper lobe (axial #81). - Minor fissure-associated 0.4 cm hypodensity (axial #118). Right middle lobe  0.3 cm hypodensity (axial #122). - Right lower lobe 0.2 cm nodule (axial #117).   Additional right lower lobe  semisolid density measuring about 0.4 cm (axial #112) and another semi-solid  density much more inferiorly near the right costophrenic angle measuring about  0.6 cm (axial #168). Another elongated 0.4 cm density at the right lung base  (axial #177). - Left lower lobe 0.3 cm nodule (axial #93) and 0.3 cm nodule (axial #150). - Fairly extensive centrilobular emphysematous changes.   - No interstitial fibrosis. - No significant pleural effusion. Mediastinum:  No mediastinal adenopathy. Cardiovascular:  No acute abnormalities involving the aorta. Mild to moderate  atheromatous plaque calcifications in the coronary arteries. Base of Neck:  No adenopathy. Axillae:  No adenopathy. Esophagus:  Unremarkable     Upper Abdomen:  No acute abnormalities. Skeletal Structures:  No acute abnormalities. Impression IMPRESSION:     1.  Multiple lung nodules as described. Recommend followup CT in 6 months. 2.  Emphysematous changes. Lung-RADS Category:  3. Probably benign. Management Recommendation:  Recommend 6 month followup low dose CT. Jona Amor Patient Active Problem List   Diagnosis Code    Anxiety F41.9    Asthma J45.909    Osteoporosis M81.0    Allergic rhinitis J30.9    Stage 3 severe COPD by GOLD classification ([facility] [address] J44.9    Examination of eyes and vision Z01.00    Bilateral shoulder pain M25.511, M25.512    SOB (shortness of breath) R06.02    Atelectasis J98.11    Pharyngoesophageal dysphagia R13.14    Personal history of smoking Z87.891       Assessment:  COPD GOLD-stage III  CAT score more than 10      IMPRESSION:   · COPD-moderate to severe with FEV1 53% predicted on PFTs from 6/18/2019. Functional group C.   Overall well compensated but limited due to dyspnea and would benefit from continued fine-tuning treatment  · Dysphagia-no overt aspiration noted on barium swallow  · Personal history of smoking-candidate for LD CT-lung RADS 3 with multiple small subcentimeter nodules which need to be followed  · Anxiety  · Hypertension      RECOMMENDATIONS:   · Discussed with patient and daughter -optimal regimen to include long-acting beta agonist and long-acting antimuscarinic  · Will recommend combination of Brovana and Spiriva Respimat as maintenance therapy with albuterol for rescue. Samples of Spiriva Respimat will be provided till she gets on the patient assistance program  · We will monitor response to above and make further adjustments  · Follow-up CT scan 6 months from last CT  · We will consider referring back to pulmonary rehab after the Covid pandemic restrictions lifted  · Preventive qulcilrrrlap-bo-du-date  · Continue maintenance therapies for COPD  · Address triggers, lifestyle and behavioral changes  · Healthy diet healthy weight  · Maintain active lifestyle  · Follow-up in 3 months     Health maintenance screens deferred to Primary care provider.      Elen Poon MD

## 2020-12-08 NOTE — LETTER
12/8/20 Patient: Miles Nicole YOB: 1945 Date of Visit: 12/8/2020 Madhavi Minor MD 
[address] [place] [address] [id number] [place] [id number] VIA Facsimile: [phone] Dear Madhavi Minor MD, Thank you for referring Ms. Padmini Etienne to [address] [place] [address] for evaluation. My notes for this consultation are attached. If you have questions, please do not hesitate to call me. I look forward to following your patient along with you.  
 
 
Sincerely, 
 
Kee Grimaldo MD

## 2021-01-07 ENCOUNTER — TELEPHONE (OUTPATIENT)
Dept: PULMONOLOGY | Age: 76
End: 2021-01-07

## 2021-01-07 NOTE — TELEPHONE ENCOUNTER
Pt [phone]). Pt wants to know if Dr Shawna Steinberg has any samples of Spiriva Respimat . Please check and call her back.

## 2021-01-21 ENCOUNTER — HOSPITAL ENCOUNTER (OUTPATIENT)
Age: 76
Setting detail: OUTPATIENT SURGERY
Discharge: HOME | End: 2021-01-21
Attending: PHYSICAL MEDICINE & REHABILITATION | Admitting: PHYSICAL MEDICINE & REHABILITATION
Payer: MEDICARE

## 2021-01-21 ENCOUNTER — APPOINTMENT (OUTPATIENT)
Dept: GENERAL RADIOLOGY | Age: 76
End: 2021-01-21
Attending: PHYSICAL MEDICINE & REHABILITATION
Payer: MEDICARE

## 2021-01-21 VITALS
TEMPERATURE: 98.3 F | SYSTOLIC BLOOD PRESSURE: 143 MMHG | HEART RATE: 98 BPM | DIASTOLIC BLOOD PRESSURE: 81 MMHG | OXYGEN SATURATION: 92 % | RESPIRATION RATE: 20 BRPM

## 2021-01-21 LAB — GLUCOSE BLD STRIP.AUTO-MCNC: 109 MG/DL (ref 70–110)

## 2021-01-21 PROCEDURE — 74011250636 HC RX REV CODE- 250/636: Performed by: PHYSICAL MEDICINE & REHABILITATION

## 2021-01-21 PROCEDURE — 64484 NJX AA&/STRD TFRM EPI L/S EA: CPT | Performed by: PHYSICAL MEDICINE & REHABILITATION

## 2021-01-21 PROCEDURE — 64483 NJX AA&/STRD TFRM EPI L/S 1: CPT | Performed by: PHYSICAL MEDICINE & REHABILITATION

## 2021-01-21 PROCEDURE — 76010000009 HC PAIN MGT 0 TO 30 MIN PROC: Performed by: PHYSICAL MEDICINE & REHABILITATION

## 2021-01-21 PROCEDURE — 77030003676 HC NDL SPN MPRI -A: Performed by: PHYSICAL MEDICINE & REHABILITATION

## 2021-01-21 PROCEDURE — 74011250637 HC RX REV CODE- 250/637: Performed by: PHYSICAL MEDICINE & REHABILITATION

## 2021-01-21 PROCEDURE — 74011000250 HC RX REV CODE- 250: Performed by: PHYSICAL MEDICINE & REHABILITATION

## 2021-01-21 PROCEDURE — 77030039433 HC TY MYLEOGRAM BD -B: Performed by: PHYSICAL MEDICINE & REHABILITATION

## 2021-01-21 PROCEDURE — 77030003669 HC NDL SPN COOK -B: Performed by: PHYSICAL MEDICINE & REHABILITATION

## 2021-01-21 PROCEDURE — 74011000636 HC RX REV CODE- 636: Performed by: PHYSICAL MEDICINE & REHABILITATION

## 2021-01-21 PROCEDURE — 82962 GLUCOSE BLOOD TEST: CPT

## 2021-01-21 PROCEDURE — 2709999900 HC NON-CHARGEABLE SUPPLY: Performed by: PHYSICAL MEDICINE & REHABILITATION

## 2021-01-21 RX ORDER — DIAZEPAM 5 MG/1
5-20 TABLET ORAL ONCE
Status: COMPLETED | OUTPATIENT
Start: 2021-01-21 | End: 2021-01-21

## 2021-01-21 RX ORDER — DEXAMETHASONE SODIUM PHOSPHATE 100 MG/10ML
INJECTION INTRAMUSCULAR; INTRAVENOUS AS NEEDED
Status: DISCONTINUED | OUTPATIENT
Start: 2021-01-21 | End: 2021-01-21 | Stop reason: HOSPADM

## 2021-01-21 RX ORDER — LIDOCAINE HYDROCHLORIDE 10 MG/ML
INJECTION, SOLUTION EPIDURAL; INFILTRATION; INTRACAUDAL; PERINEURAL AS NEEDED
Status: DISCONTINUED | OUTPATIENT
Start: 2021-01-21 | End: 2021-01-21 | Stop reason: HOSPADM

## 2021-01-21 RX ADMIN — DIAZEPAM 10 MG: 5 TABLET ORAL at 12:38

## 2021-01-21 NOTE — PROCEDURES
PROCEDURE NOTE      Patient Name: Carlee Sweet    Date of Procedure: January 21, 2021    Preoperative Diagnosis:  Lumbar neuritis [M54.16]    Post Operative Diagnosis:  Lumbar neuritis [M54.16]    Procedure :    left L5 Selective Nerve Root Block  left S1 Selective Nerve Root Block    Consent:  Informed consent was obtained prior to the procedure. The patient was given the opportunity to ask questions regarding the procedure and its associated risks. In addition to the potential risks associated with the procedure itself, the patient was informed both verbally and in writing of the potential side effects of the use of glucocorticoid. The patient appeared to comprehend the informed consent and desired to have the procedure performed. Procedure: The patient was placed in the prone position on the fluoroscopy table and the back was prepped and draped in the usual sterile manner. The exact spinal level was  identified using fluoroscopy, and Lidocaine 1 % was injected locally, a # 22 gauge spinal needle was passed to the transverse process. The depth was noted and the needle redirected to pass inferior and approximately one cm anterior to the transverse process. YES  1 cc of Isovue M-200 was used to verify positioning in the epidural and paravertebral space and outlined the course of the spinal nerve into the epidural space. The same procedure was repeated at each spinal level indicated above. A total of 10 mg of preservative free Dexamethasone and 2 cc of Lidocaine was slowly injected. The patient tolerated the procedure well. The injection area was cleaned and bandaids applied. Not excessive bleeding was noted. Patient dressed and discharged to home with instructions. Discussion: The patient tolerated the procedure well.                                               Milad Slade MD  January 21, 2021

## 2021-01-21 NOTE — DISCHARGE INSTRUCTIONS
[facility]   ([facility])  Dr. Bulmaro Burton, Dr. Roseline Little, Dr. Souleymane Lyles Spinal Procedure (Block) Instructions    * Do not drive a car, operate heavy machinery or dangerous equipment, or make important decisions for 12-24 hours. * Light activity as tolerated; may rest for the remainder of the day. * Resume pre-block medications including those from your other doctors. * Do not drink alcoholic beverages for 24 hours. Alcohol and the medications you have received may interact and cause an adverse reaction. * You may feel better this evening and worse tomorrow, as the numbing medications wears off and the steroid has yet to begin to work. After 48-72 hrs the steroid should begin to release bringing you relief. If you had a medial branch block, no steroids were used. The medial branch block is a test to see if you are a candidate for radiofrequency ablation (RFA). The anesthetic (numbing medicine)  will wear off by the next day. * You may shower this evening and remove any bandages. * Avoid hot tubs/pools/tub soaks and heating pads for 24 hours. You may use cold packs on the procedure site as tolerated for the first 24 hours. * If a headache develops, drink plenty of fluids and rest.  Take over the counter medications for headache if needed. If the headache continues longer than 24 hours, call MD at the [address] [place] [address] Avenue. [phone]    * Continue taking pain medications as needed. * You may resume your regular diet if tolerated. Otherwise, start with sips of water and advance slowly. * If Diabetic: check your blood sugar three times a day for the next 3 days. If your sugar is greater than 300 call your family doctor. If your sugar is greater than 400, have someone transport you to the nearest Emergency Room. * If you experience any of the following problems, Please Call the [address] [place] [address] Avenue at [phone].         * Excessive pain, swelling, redness or odor at or around the surgical area    * Fever of 101 or higher    * Nausea / Vomiting lasting longer than 4 hours or if unable to take medications. * Severe Headache    * Weakness or numbness in arms or legs that is not      resolving   * Any NEW signs of decreased circulation or nerve impairment in leg: change in color, swelling, persistent numbness, tingling                    * Prolonged increase in pain greater than 4 days      PATIENT INSTRUCTIONS:    After oral sedation, for 12-24 hours or while taking prescription Narcotics:  · Limit your activities  · Do not drive and operate hazardous machinery  · Do not make important personal or business decisions  · Do  not drink alcoholic beverages  · If you have not urinated within 8 hours after discharge, please contact your surgeon on call. *  Please give a list of your current medications to your Primary Care Provider. *  Please update this list whenever your medications are discontinued, doses are      changed, or new medications (including over-the-counter products) are added. *  Please carry medication information at all times in case of emergency situations. These are general instructions for a healthy lifestyle:    No smoking/ No tobacco products/ Avoid exposure to second hand smoke    Surgeon General's Warning:  Quitting smoking now greatly reduces serious risk to your health. Obesity, smoking, and sedentary lifestyle greatly increases your risk for illness    A healthy diet, regular physical exercise & weight monitoring are important for maintaining a healthy lifestyle    You may be retaining fluid if you have a history of heart failure or if you experience any of the following symptoms:  Weight gain of 3 pounds or more overnight or 5 pounds in a week, increased swelling in our hands or feet or shortness of breath while lying flat in bed.   Please call your doctor as soon as you notice any of these symptoms; do not wait until your next office visit. Recognize signs and symptoms of STROKE:    F-face looks uneven    A-arms unable to move or move unevenly    S-speech slurred or non-existent    T-time-call 911 as soon as signs and symptoms begin-DO NOT go       Back to bed or wait to see if you get better-TIME IS BRAIN.

## 2021-02-03 DIAGNOSIS — G60.9 IDIOPATHIC PERIPHERAL NEUROPATHY: ICD-10-CM

## 2021-02-03 DIAGNOSIS — M43.10 SPONDYLOLISTHESIS, ACQUIRED: ICD-10-CM

## 2021-02-03 DIAGNOSIS — M48.061 SPINAL STENOSIS OF LUMBAR REGION, UNSPECIFIED WHETHER NEUROGENIC CLAUDICATION PRESENT: ICD-10-CM

## 2021-02-03 DIAGNOSIS — M47.817 LUMBOSACRAL SPONDYLOSIS WITHOUT MYELOPATHY: ICD-10-CM

## 2021-02-03 DIAGNOSIS — M54.16 LUMBAR NEURITIS: ICD-10-CM

## 2021-02-03 DIAGNOSIS — M51.36 DDD (DEGENERATIVE DISC DISEASE), LUMBAR: ICD-10-CM

## 2021-02-03 RX ORDER — PREGABALIN 100 MG/1
CAPSULE ORAL
Qty: 60 CAP | Refills: 0 | Status: SHIPPED | OUTPATIENT
Start: 2021-02-03 | End: 2022-05-27

## 2021-02-16 NOTE — PROGRESS NOTES
[facility]  [address], [place] Ram Anthony Strickland  Phone: [phone]  Fax: [phone]        PROGRESS NOTE    CONSENT:  Pursuant to the emergency declaration under the [address] and [facility] [place] [address] waiver authority and the [employer] and [employer] Act, this Virtual Visit was conducted, with patient's consent, to reduce the patient's risk of exposure to COVID-19 and provide continuity of care for an established patient. Services were unable to be provided through a video synchronous discussion virtually to substitute for in-person appointment. Subsequently, the patient was consulted through a telephone discussion. ENCOUNTER DURATION : 16 minutes 17 seconds         HISTORY OF PRESENT ILLNESS:  The patient is a 66 y.o. female. Ms. Jacques Rodríguez is being consulted from home by me via telephone at the [facility] officefor follow up of left-sided lower back pain radiating into the LLE in a S1 distribution to the ankle. Previously, she was seen for progressive, intermittent low back pain into the LLE radiating to the ankle x 8/2019 without trauma. Her pain is exacerbated with walking. Positive shopping cart sign. She has completed MDP without benefit. She is currently on Cymbalta 90 mg secondary to depression. Patient failed NEURONTIN 800 mg TID secondary to no relief. Pt underwent left L5 and S1 SNRB on 5/28/2020 with good relief. Pt denies change in bowel or bladder habits. Pt denies fever, weight loss, or skin changes. Pt denies any signs of weakness. Patient denies previous spinal surgery or physical therapy/chiropractic care. Pt denies h/o chemotherapy, gastric bypass, alcohol abuse, or DM. PmHx of depression. Note from Dr. Mak Fox patient was seen with c/o pain and burning in the left leg.  Indicated patient was on Cymbalta 60 mg and Neurontin 300 mg BID at that time. Note from  Rosalbaavaty dated 2/19/2020 indicating patient reports no change since last visit. Indicated she endorsed better benefit with the qhs dose of Neurontin. Indicated they increased her Neurontin to 600 mg TID and started her on a MDP at that time. Note from Dr. Samul Babinski 7/9/2020 indicating patient was seen with c/o right shoulder pain. Her pain is worse with overhead activity and at night. Indicated she has OA of the right shoulder. L Spine MRI dated 2/10/2020 films reviewed. Per report, grade 1 anterolistheses at L4-5 and L5-S1. Mild spinal canal stenosis at L2-3. Moderate spinal canal stenoses from L3-4 to L5-S1. Mild foraminal stenoses at L3-4 and L4-5. Moderate to severe bilateral foraminal stenoses at L5-S1 with potential L5 nerve root compression. Complex left renal cyst with fluid-hemorrhage level. Other small simple renal cysts.  LLE EMG dated 1/17/2020 suggested: sensorimotor peripheral neuropathy and chronic L5 and S1 radiculopathy. At her last clinical appointment, I provided her refills of Lyrica 100 mg BID. I offered another injection, pt declined. At today's telephone consultation, the patient reports pain location and distribution remains unchanged. She rates her pain 3/10, previously 0-8/10. Pt underwent left-sided L5 and S1 SNRB on 1/21/2021 with relief. Her pain is more intermittent in nature. She is tolerating the Lyrica 100 mg BID without benefit. Pt reports a fall a couple weeks ago. She was knocked over by a dog and fell onto her buttocks. Her pain has improved. Pt denies change in bowel or bladder habits. Pt denies any signs of weakness.  reviewed. There is no height or weight on file to calculate BMI.     PCP: Brenda Bailon MD      Past Medical History:   Diagnosis Date    Allergic rhinitis     on allergy shots    Anemia     Asthma     Borderline diabetic     Cataract     Chronic lung disease     Cigarette smoker     abuse quit 2006    COPD (chronic obstructive pulmonary disease) ([facility] [address]     Degenerative arthritis     both shoulders    Depression     Easy bruising     Hypertension     Nervousness     Osteoporosis 10/27/99    Personal history of smoking 9/10/2020    Pharyngoesophageal dysphagia 9/10/2020        Social History     Socioeconomic History    Marital status:      Spouse name: Not on file    Number of children: Not on file    Years of education: Not on file    Highest education level: Not on file   Occupational History    Not on file   Social Needs    Financial resource strain: Not on file    Food insecurity     Worry: Not on file     Inability: Not on file    Transportation needs     Medical: Not on file     Non-medical: Not on file   Tobacco Use    Smoking status: Former Smoker     Packs/day: 2.50     Years: 45.00     Pack years: 112.50     Types: Cigarettes     Start date: 9/10/1959     Quit date: 1/1/2006     Years since quitting: 15.1    Smokeless tobacco: Never Used    Tobacco comment: smoked for 50 years   Substance and Sexual Activity    Alcohol use:  Yes     Alcohol/week: 2.0 - 3.0 standard drinks     Types: 2 - 3 Standard drinks or equivalent per week     Comment: occ    Drug use: No    Sexual activity: Not Currently   Lifestyle    Physical activity     Days per week: Not on file     Minutes per session: Not on file    Stress: Not on file   Relationships    Social connections     Talks on phone: Not on file     Gets together: Not on file     Attends [employer]al service: Not on file     Active member of club or organization: Not on file     Attends meetings of clubs or organizations: Not on file     Relationship status: Not on file    Intimate partner violence     Fear of current or ex partner: Not on file     Emotionally abused: Not on file     Physically abused: Not on file     Forced sexual activity: Not on file   Other Topics Concern    Not on file   Social History Narrative    Not on file       Current Outpatient Medications   Medication Sig Dispense Refill    pregabalin (LYRICA) 100 mg capsule TAKE ONE CAPSULE BY MOUTH TWICE A DAY. MAX DAILY AMOUNT: 200MG 60 Cap 0    tiotropium bromide (Spiriva Respimat) 2.5 mcg/actuation inhaler Take 2 Puffs by inhalation daily. 2 Inhaler 0    formoterol (Perforomist) 20 mcg/2 mL nebu neb solution 2 mL by Nebulization route once over twenty-four (24) hours. Diag. Code: J44.9, J45.20, R06.02  File under Medicare B 30 Each 0    arformoteroL (Brovana) 15 mcg/2 mL nebu neb solution 2 mL by Nebulization route two (2) times a day. J44.9. Indications: severe copd 60 Vial 3    albuterol sulfate (PROVENTIL;VENTOLIN) 2.5 mg/0.5 mL nebu nebulizer solution 2.5 mg by Nebulization route every four (4) hours as needed for Wheezing or Shortness of Breath. [employer]      meloxicam (MOBIC) 15 mg tablet Take 15 mg by mouth as needed.  DULoxetine (CYMBALTA) 20 mg capsule Take 90 mg by mouth daily.  pravastatin (PRAVACHOL) 20 mg tablet       lisinopril (PRINIVIL, ZESTRIL) 10 mg tablet TAKE 1 TABLET BY MOUTH DAILY 30 Tab 0    sertraline (ZOLOFT) 100 mg tablet TAKE 1 TABLET BY MOUTH EVERY DAY 30 Tab 6    albuterol (VENTOLIN HFA) 90 mcg/actuation inhaler Take 2 Puffs by inhalation every four (4) hours as needed for Wheezing. 1 Inhaler 2    pregabalin (LYRICA) 50 mg capsule Take 1 Cap by mouth two (2) times a day. Max Daily Amount: 100 mg. (Patient taking differently: Take 100 mg by mouth two (2) times a day.) 180 Cap 0    gabapentin (Neurontin) 400 mg capsule Take 1 cap tid x 1 week, then 1 cap bid x 1 week, then 1 cap every day x 1 week and then stop (Patient not taking: Reported on 12/2/2020) 42 Cap 0    pregabalin (LYRICA) 50 mg capsule Take 1 Cap by mouth two (2) times a day. Max Daily Amount: 100 mg. 60 Cap 1    pregabalin (LYRICA) 50 mg capsule Take 1 Cap by mouth two (2) times a day.  Max Daily Amount: 100 mg. 60 Cap 1    gabapentin (NEURONTIN) 100 mg capsule 600 mg.     [facility] cyclobenzaprine (FLEXERIL) 5 mg tablet Take 5 mg by mouth three (3) times daily as needed for Muscle Spasm(s).  diazepam (VALIUM) 5 mg tablet Take 1 Tab by mouth two (2) times daily as needed for Anxiety. 60 Tab 2       Allergies   Allergen Reactions    Qvar [Beclomethasone Dipropionate] Shortness of Breath    Entex [Phenylephrine-Guaifenesin] Other (comments)     intolerance    Sulfa (Sulfonamide Antibiotics) Other (comments)          PHYSICAL EXAMINATION  Unable to perform examination secondary to COVID-19. CONSTITUTIONAL: NAD, A&O x 3    ASSESSMENT   Diagnoses and all orders for this visit:    1. Lumbosacral spondylosis without myelopathy    2. Spinal stenosis of lumbar region, unspecified whether neurogenic claudication present    3. Lumbar neuritis    4. DDD (degenerative disc disease), lumbar    5. Idiopathic peripheral neuropathy    6. Spondylolisthesis, acquired        IMPRESSION AND PLAN:  The patient consented to the tele health visit and was aware that there would be a charge. During today's telephone consultation patient had c/o left-sided lower back pain radiating into the LLE in a S1 distribution to the ankle. Multiple treatment options were discussed. I offered PT, pt deferred. I will increase her Lyrica from 100 mg BID to 150 mg BID. Patient advised to call the office if intolerant to higher dose. Pt appears to be neurologically intact. I will see the patient back in 1 month's time or earlier if needed. Written by Ninfa Post, as dictated by Earlene Zaldivar MD  I examined the patient, reviewed and agree with the note.

## 2021-02-18 ENCOUNTER — VIRTUAL VISIT (OUTPATIENT)
Dept: ORTHOPEDIC SURGERY | Age: 76
End: 2021-02-18
Payer: MEDICARE

## 2021-02-18 DIAGNOSIS — G60.9 IDIOPATHIC PERIPHERAL NEUROPATHY: ICD-10-CM

## 2021-02-18 DIAGNOSIS — M51.36 DDD (DEGENERATIVE DISC DISEASE), LUMBAR: ICD-10-CM

## 2021-02-18 DIAGNOSIS — M48.061 SPINAL STENOSIS OF LUMBAR REGION, UNSPECIFIED WHETHER NEUROGENIC CLAUDICATION PRESENT: ICD-10-CM

## 2021-02-18 DIAGNOSIS — M47.817 LUMBOSACRAL SPONDYLOSIS WITHOUT MYELOPATHY: Primary | ICD-10-CM

## 2021-02-18 DIAGNOSIS — M54.16 LUMBAR NEURITIS: ICD-10-CM

## 2021-02-18 DIAGNOSIS — M43.10 SPONDYLOLISTHESIS, ACQUIRED: ICD-10-CM

## 2021-02-18 PROCEDURE — G8432 DEP SCR NOT DOC, RNG: HCPCS | Performed by: PHYSICAL MEDICINE & REHABILITATION

## 2021-02-18 PROCEDURE — G8419 CALC BMI OUT NRM PARAM NOF/U: HCPCS | Performed by: PHYSICAL MEDICINE & REHABILITATION

## 2021-02-18 PROCEDURE — G8427 DOCREV CUR MEDS BY ELIG CLIN: HCPCS | Performed by: PHYSICAL MEDICINE & REHABILITATION

## 2021-02-18 PROCEDURE — G8536 NO DOC ELDER MAL SCRN: HCPCS | Performed by: PHYSICAL MEDICINE & REHABILITATION

## 2021-02-18 PROCEDURE — 1090F PRES/ABSN URINE INCON ASSESS: CPT | Performed by: PHYSICAL MEDICINE & REHABILITATION

## 2021-02-18 PROCEDURE — 99443 PR PHYS/QHP TELEPHONE EVALUATION 21-30 MIN: CPT | Performed by: PHYSICAL MEDICINE & REHABILITATION

## 2021-02-18 PROCEDURE — 1101F PT FALLS ASSESS-DOCD LE1/YR: CPT | Performed by: PHYSICAL MEDICINE & REHABILITATION

## 2021-02-18 RX ORDER — PREGABALIN 150 MG/1
150 CAPSULE ORAL 2 TIMES DAILY
Qty: 60 CAP | Refills: 1 | Status: SHIPPED | OUTPATIENT
Start: 2021-02-18 | End: 2022-05-27

## 2021-02-23 ENCOUNTER — TELEPHONE (OUTPATIENT)
Dept: PULMONOLOGY | Age: 76
End: 2021-02-23

## 2021-03-09 ENCOUNTER — OFFICE VISIT (OUTPATIENT)
Dept: PULMONOLOGY | Age: 76
End: 2021-03-09
Payer: MEDICARE

## 2021-03-09 VITALS
TEMPERATURE: 97.9 F | HEART RATE: 89 BPM | HEIGHT: 63 IN | BODY MASS INDEX: 29.98 KG/M2 | RESPIRATION RATE: 18 BRPM | SYSTOLIC BLOOD PRESSURE: 144 MMHG | DIASTOLIC BLOOD PRESSURE: 71 MMHG | WEIGHT: 169.2 LBS | OXYGEN SATURATION: 93 %

## 2021-03-09 DIAGNOSIS — R06.02 SOB (SHORTNESS OF BREATH): ICD-10-CM

## 2021-03-09 DIAGNOSIS — G47.33 OSA (OBSTRUCTIVE SLEEP APNEA): ICD-10-CM

## 2021-03-09 DIAGNOSIS — R91.8 LUNG NODULES: ICD-10-CM

## 2021-03-09 DIAGNOSIS — R40.0 SOMNOLENCE: ICD-10-CM

## 2021-03-09 DIAGNOSIS — J98.11 ATELECTASIS: ICD-10-CM

## 2021-03-09 DIAGNOSIS — J44.9 STAGE 3 SEVERE COPD BY GOLD CLASSIFICATION (HCC): Primary | ICD-10-CM

## 2021-03-09 PROCEDURE — G8536 NO DOC ELDER MAL SCRN: HCPCS | Performed by: INTERNAL MEDICINE

## 2021-03-09 PROCEDURE — G8432 DEP SCR NOT DOC, RNG: HCPCS | Performed by: INTERNAL MEDICINE

## 2021-03-09 PROCEDURE — G8419 CALC BMI OUT NRM PARAM NOF/U: HCPCS | Performed by: INTERNAL MEDICINE

## 2021-03-09 PROCEDURE — 99215 OFFICE O/P EST HI 40 MIN: CPT | Performed by: INTERNAL MEDICINE

## 2021-03-09 PROCEDURE — 1090F PRES/ABSN URINE INCON ASSESS: CPT | Performed by: INTERNAL MEDICINE

## 2021-03-09 PROCEDURE — G8427 DOCREV CUR MEDS BY ELIG CLIN: HCPCS | Performed by: INTERNAL MEDICINE

## 2021-03-09 PROCEDURE — 1101F PT FALLS ASSESS-DOCD LE1/YR: CPT | Performed by: INTERNAL MEDICINE

## 2021-03-09 RX ORDER — DOXYCYCLINE 100 MG/1
100 CAPSULE ORAL 2 TIMES DAILY
Qty: 20 CAP | Refills: 0 | Status: SHIPPED | OUTPATIENT
Start: 2021-03-09

## 2021-03-09 RX ORDER — ALBUTEROL SULFATE 90 UG/1
2 AEROSOL, METERED RESPIRATORY (INHALATION)
Qty: 1 INHALER | Refills: 2 | Status: SHIPPED | OUTPATIENT
Start: 2021-03-09

## 2021-03-09 NOTE — PROGRESS NOTES
[facility]  Pulmonary, Critical Care, and Sleep Medicine      Pulmonary Office follow-up visit    Name: Concetta Mehta     : 1945     Date: 3/9/2021        Subjective:   Patient has been referred for evaluation of: COPD.    21   Patient continues to complain of difficulty breathing-apparently was feeling somewhat improved for a brief period of time and now back to feeling short of breath all the time. Her daughter has noticed that over the past 4 days-5 days patient is having a cough productive of beige-colored mucus. Patient received her first dose of Covid vaccine on   Daughter also mentions that patient has been sleeping a lot in the daytime. Wakes up fatigued with unrefreshed sleep  Patient is currently using Brovana and Spiriva Respimat. She is working on getting patient assistance to secure the medications and in the meantime requesting samples for Spiriva. She denies any chest pain  Denies fever night sweats  Denies any swelling of the lower extremities  She has not been very active due to symptoms of shortness of breath    HPI  Patient is a 66 y.o. female who is accompanied by her daughter and gives a history of being diagnosed with COPD around . Patient has been followed at [facility] pulmonology-Dr. Lex Lim who recently left the area and therefore is seeking a new pulmonologist.  Afia Zaldivarnilsa a history of having 3 pack a day smoking until about 14 years back when she quit. She has symptoms of shortness of breath on minimal exertion on a daily basis. She mentions getting hot sweaty with any physical activity and is not able to lift any objects, bend stoop without getting out of breath. She paces her activities of daily living. Has not climbed a stair in long time as far she can remember.   She has been on Anoro and recently was prescribed-nebulized Lico Brow but her insurance did not cover and therefore she is continued on the Anoro which she thinks is not miki and Dr. George Duff felt that patient's inhalation techniques were part of the issue. She denies any orthopnea or paroxysmal nocturnal dyspnea  Denies any pedal edema  Patient mentions having some cough-not very bothersome. Her daughter although contradicts and states that she has a daily cough and she gets easily choked on food. Patient states that usually if she eats crackers, kernels of corn rice she feels getting choked in her lower throat and starts coughing. She is also noticed this with several liquids. Has intermittent wheezing  Denies chest pain  She has not been on any home oxygen  Her pulmonary function tests were done about a year ago at [address] [place] [address]  She does not recall ever having a CT scan of her chest  Patient states that she is not able to tolerate inhaled corticosteroids  She has completed pulmonary rehab in 2016.     1 2 3 4 5   Cough  2      Mucus 1       Chest tightness 1       ADL's  2      Climb 1 flight stairs     5   Sleep 1       Energy level  2        Scale 1   2  3  4  5  Never to all the time    Or CAT> 10     Patient is a retired   Occupational exposure-none to any inhalational dust fumes or chemicals  Environmental exposures-has 2 dogs at home.   They do not shed  ILD history:  No Hx of connective tissue disease such as RA, Lupus, Scleroderma  Now Hx Raynauds  No Hx sarcoidosis  No Hx taking medications- methotrexate, Amiodarone, Nitrofurantoin  No Hx cancer, chemotherapy, radiation  No Hx Birds, chickens, farm animals    Review of data:  Testing:  CXR  CT [id number]  PFT-likely 6/18/2019  Echo-now  6 min walk-[place]    Vaccinations:  Pneumococcal  Influenza    DME:  Oxygen-no  Nebulizer-yes      Past Medical History:   Diagnosis Date    Allergic rhinitis     on allergy shots    Anemia     Asthma     Borderline diabetic     Cataract     Chronic lung disease     Cigarette smoker     abuse quit 2006    COPD (chronic obstructive pulmonary disease) ([facility] [address]  Degenerative arthritis     both shoulders    Depression     Easy bruising     Hypertension     Nervousness     Osteoporosis 10/27/99    Personal history of smoking 9/10/2020    Pharyngoesophageal dysphagia 9/10/2020       Past Surgical History:   Procedure Laterality Date    HX BACK SURGERY      nerve block    HX GYN  1974    hysterectomy    HX GYN  1970    D & C    HX HEENT  age 3    tonsillectomy    HX ORTHOPAEDIC  age 21    right leg tumor removed benign     Allergies   Allergen Reactions    Qvar [Beclomethasone Dipropionate] Shortness of Breath    Entex [Phenylephrine-Guaifenesin] Other (comments)     intolerance    Sulfa (Sulfonamide Antibiotics) Other (comments)     Current Outpatient Medications   Medication Sig Dispense Refill    tiotropium bromide (Spiriva Respimat) 2.5 mcg/actuation inhaler Take 2 Puffs by inhalation daily. 2 Inhaler 0    pregabalin (Lyrica) 150 mg capsule Take 1 Cap by mouth two (2) times a day. Max Daily Amount: 300 mg. 60 Cap 1    pregabalin (LYRICA) 100 mg capsule TAKE ONE CAPSULE BY MOUTH TWICE A DAY. MAX DAILY AMOUNT: 200MG 60 Cap 0    pregabalin (LYRICA) 50 mg capsule Take 1 Cap by mouth two (2) times a day. Max Daily Amount: 100 mg. (Patient taking differently: Take 100 mg by mouth two (2) times a day.) 180 Cap 0    formoterol (Perforomist) 20 mcg/2 mL nebu neb solution 2 mL by Nebulization route once over twenty-four (24) hours. Diag. Code: J44.9, J45.20, R06.02  File under Medicare B 30 Each 0    arformoteroL (Brovana) 15 mcg/2 mL nebu neb solution 2 mL by Nebulization route two (2) times a day. J44.9. Indications: severe copd 60 Vial 3    albuterol sulfate (PROVENTIL;VENTOLIN) 2.5 mg/0.5 mL nebu nebulizer solution 2.5 mg by Nebulization route every four (4) hours as needed for Wheezing or Shortness of Breath.  [employer]      gabapentin (Neurontin) 400 mg capsule Take 1 cap tid x 1 week, then 1 cap bid x 1 week, then 1 cap every day x 1 week and then stop (Patient not taking: Reported on 12/2/2020) 42 Cap 0    pregabalin (LYRICA) 50 mg capsule Take 1 Cap by mouth two (2) times a day. Max Daily Amount: 100 mg. 60 Cap 1    pregabalin (LYRICA) 50 mg capsule Take 1 Cap by mouth two (2) times a day. Max Daily Amount: 100 mg. 60 Cap 1    meloxicam (MOBIC) 15 mg tablet Take 15 mg by mouth as needed.  DULoxetine (CYMBALTA) 20 mg capsule Take 90 mg by mouth daily.  pravastatin (PRAVACHOL) 20 mg tablet       gabapentin (NEURONTIN) 100 mg capsule 600 mg.  cyclobenzaprine (FLEXERIL) 5 mg tablet Take 5 mg by mouth three (3) times daily as needed for Muscle Spasm(s).  lisinopril (PRINIVIL, ZESTRIL) 10 mg tablet TAKE 1 TABLET BY MOUTH DAILY 30 Tab 0    sertraline (ZOLOFT) 100 mg tablet TAKE 1 TABLET BY MOUTH EVERY DAY 30 Tab 6    albuterol (VENTOLIN HFA) 90 mcg/actuation inhaler Take 2 Puffs by inhalation every four (4) hours as needed for Wheezing. 1 Inhaler 2    diazepam (VALIUM) 5 mg tablet Take 1 Tab by mouth two (2) times daily as needed for Anxiety.  60 Tab 2     Review of Systems:  HEENT: No epistaxis, no nasal drainage, no difficulty in swallowing, no redness in eyes  Respiratory: as above  Cardiovascular: no chest pain, no palpitations, no chronic leg edema, no syncope  Gastrointestinal: no abd pain, no vomiting, no diarrhea, no bleeding symptoms  Genitourinary: No urinary symptoms or hematuria  Integument/breast: No ulcers or rashes  Musculoskeletal:Neg  Neurological: No focal weakness, no seizures, no headaches  Behvioral/Psych: No anxiety, no depression  Constitutional: No fever, no chills, no weight loss, no night sweats     Objective:     Visit Vitals  BP (!) 144/71 (BP 1 Location: Right upper arm, BP Patient Position: Sitting, BP Cuff Size: Large adult)   Pulse 89   Temp 97.9 °F (36.6 °C) (Oral)   Resp 18   Ht 5' 3\" (1.6 m)   Wt 76.7 kg (169 lb 3.2 oz)   LMP 01/01/1974   SpO2 93% Comment: RA Rest   BMI 29.97 kg/m²        Physical Exam: General: comfortable, no acute distress  HEENT: pupils reactive, sclera anicteric, EOM intact  Neck: No adenopathy or thyroid swelling, no lymphadenopathy or JVD, supple  CVS: S1S2 no murmurs  RS: Mod AE bilaterally, no tactile fremitus or egophony, no accessory muscle use  Abd: soft, non tender, no hepatosplenomegaly  Neuro: non focal, awake, alert  Extrm: no leg edema, clubbing or cyanosis  Skin: no rash    Data review:   Pertinent labs: CBC, BMP, LFT's    PFT:    Date FVC FEV1  FEV1/FVC [id number] TLC RV RV/TLC VC DLCO   6/18/2019  81  53  64  25  106  149  140  69  48/73                                         6 min walk test; madeline SaO2 89%      No results found for this or any previous visit. Imaging:  I have personally reviewed the patients radiographs and have reviewed the reports:  XR Results (most recent):  Results from Hospital Encounter encounter on 01/21/21   NC XR TECHNOLOGIST SERVICE    Narrative Fluoroscopy was provided for a bundled exam for documentation purposes. Impression Please see above. FLUORO TIME: 00.20    AJB       CT Results (most recent):  Results from Hospital Encounter encounter on 10/01/20   CT LOW DOSE LUNG CANCER SCREENING    Narrative EXAM:  CT Chest without Contrast -- Low Dose Screening CT. CLINICAL INDICATION:     - Screening.  - Meets the criteria, i.e. 120 pack years smoking (40 yrs x 3 ppd),  asymptomatic. COMPARISON:  None. TECHNIQUE:      Low dose unenhanced multislice helical CT is performed from the thoracic inlet  to the adrenal glands without intravenous contrast administration. Contiguous  axial images were reconstructed and lung and soft tissue windows were generated. Coronal and sagittal reformations were also generated.       Dose reduction techniques are used, including automated exposure control,  adjustment of the mAs and/or kVp according to patient size, standardized  low-dose protocol, and/or iterative reconstruction technique. FINDINGS:    Technique Assessment:  The overall quality of the study is adequate for  diagnostic review. Lungs, Pleura:    - 0.2 cm nodule in the right upper lobe (axial #81). - Minor fissure-associated 0.4 cm hypodensity (axial #118). Right middle lobe  0.3 cm hypodensity (axial #122). - Right lower lobe 0.2 cm nodule (axial #117). Additional right lower lobe  semisolid density measuring about 0.4 cm (axial #112) and another semi-solid  density much more inferiorly near the right costophrenic angle measuring about  0.6 cm (axial #168). Another elongated 0.4 cm density at the right lung base  (axial #177). - Left lower lobe 0.3 cm nodule (axial #93) and 0.3 cm nodule (axial #150). - Fairly extensive centrilobular emphysematous changes.   - No interstitial fibrosis. - No significant pleural effusion. Mediastinum:  No mediastinal adenopathy. Cardiovascular:  No acute abnormalities involving the aorta. Mild to moderate  atheromatous plaque calcifications in the coronary arteries. Base of Neck:  No adenopathy. Axillae:  No adenopathy. Esophagus:  Unremarkable     Upper Abdomen:  No acute abnormalities. Skeletal Structures:  No acute abnormalities. Impression IMPRESSION:     1.  Multiple lung nodules as described. Recommend followup CT in 6 months. 2.  Emphysematous changes. Lung-RADS Category:  3. Probably benign. Management Recommendation:  Recommend 6 month followup low dose CT. Rj Ferguson      Patient Active Problem List   Diagnosis Code    Anxiety F41.9    Asthma J45.909    Osteoporosis M81.0    Allergic rhinitis J30.9    Stage 3 severe COPD by GOLD classification ([address] J44.9    Examination of eyes and vision Z01.00    Bilateral shoulder pain M25.511, M25.512    SOB (shortness of breath) R06.02    Atelectasis J98.11    Pharyngoesophageal dysphagia R13.14    Personal history of smoking Z87.891 Assessment:  COPD GOLD-stage III  CAT score more than 10      IMPRESSION:   · COPD-moderate to severe with FEV1 53% predicted on PFTs from 6/18/2019. Functional group C. Overall was well compensated but limited due to dyspnea and would benefit from continued fine-tuning treatment  · Acute bronchitis  · Excessive somnolence-likely multifactorial with component of medications(on Valium and Lyrica, Cymbalta), concern for CO2 retention and undiagnosed sleep apnea  · Dysphagia-no overt aspiration noted on barium swallow  · Personal history of smoking-candidate for LD CT-lung RADS 3 with multiple small subcentimeter nodules which need to be followed  · Anxiety  · Hypertension      RECOMMENDATIONS:   · Discussed with patient and daughter -optimal regimen to include long-acting beta agonist and long-acting antimuscarinic  · Will recommend combination of Brovana and Spiriva Respimat as maintenance therapy with albuterol for rescue. Samples of Spiriva Respimat will be provided till she gets on the patient assistance program  · We will monitor response to above and make further adjustments  · Follow-up CT scan 6 months from last CT-ordering  · We will be referring back to pulmonary rehab   · Check ABG on room air looking for CO2 retention  · We will treat acute bronchitis with doxycycline for 10 days-we will refrain from prescribing prednisone as she is between 2 Covid vaccine doses  · Ordering home sleep study  · Preventive lymspitbaedq-ya-wp-date  · Continue maintenance therapies for COPD  · Address triggers, lifestyle and behavioral changes  · Healthy diet healthy weight  · Maintain active lifestyle  · Follow-up in 3 months     Health maintenance screens deferred to Primary care provider.      Sanjay Thao MD

## 2021-03-09 NOTE — PROGRESS NOTES
Nicolette Hall presents today for   Chief Complaint   Patient presents with    Cough    Shortness of Breath       Is someone accompanying this pt? Daughter    Is the patient using any DME equipment during [address]? No    -DME Company NA    Depression Screening:  3 most recent PHQ Screens 9/15/2020   Little interest or pleasure in doing things Not at all   Feeling down, depressed, irritable, or hopeless Not at all   Total Score PHQ 2 0       Learning Assessment:  Learning Assessment 2/11/2014   PRIMARY LEARNER Patient   HIGHEST LEVEL OF EDUCATION - PRIMARY LEARNER  GRADUATED HIGH SCHOOL OR GED   BARRIERS PRIMARY LEARNER NONE   CO-LEARNER CAREGIVER No   PRIMARY LANGUAGE ENGLISH    NEED No   LEARNER PREFERENCE PRIMARY DEMONSTRATION     -     -   LEARNING SPECIAL TOPICS no   ANSWERED BY patient   RELATIONSHIP SELF       Abuse Screening:  No flowsheet data found. Fall Risk  Fall Risk Assessment, last 12 mths 9/15/2020   Able to walk? Yes   Fall in past 12 months? No         Coordination of Care:  1. Have you been to the ER, urgent care clinic since your last visit? Hospitalized since your last visit? No    2. Have you seen or consulted any other health care providers outside of the [address] [place] [address] since your last visit? Include any pap smears or colon screening. No    Patient had first Moderna Vaccine. Flu and Pneumo  Vaccine is current.

## 2021-03-09 NOTE — LETTER
3/9/2021 Patient: Obdulia Edouard YOB: 1945 Date of Visit: 3/9/2021 Claude Scriver, MD 
[address] [place] [phone] Via Fax: [phone] Dear Claude Scriver, MD, Thank you for referring Ms. Colette Deras to [address] [place] [address] for evaluation. My notes for this consultation are attached. If you have questions, please do not hesitate to call me. I look forward to following your patient along with you.  
 
 
Sincerely, 
 
Baron Ariella MD

## 2021-03-16 NOTE — PROGRESS NOTES
[facility]  [address] [facility]  Jacklyn Shaw, [address]  Phone: [phone]  Fax: [phone]        PROGRESS NOTE    CONSENT:  Pursuant to the emergency declaration under the [address] and [facility] waiver authority and the [employer] and [employer] Act, this Virtual Visit was conducted, with patient's consent, to reduce the patient's risk of exposure to COVID-19 and provide continuity of care for an established patient. Services were unable to be provided through a video synchronous discussion virtually to substitute for in-person appointment. Subsequently, the patient was consulted through a telephone discussion. ENCOUNTER DURATION : 14 minutes 23 seconds        HISTORY OF PRESENT ILLNESS:  The patient is a 66 y.o. female. Ms. Lisa Guthrie is being consulted from home by me via telephone at the [facility] office  for follow up of left-sided lower back pain radiating into the LLE in a S1 distribution to the ankle. Previously, she was seen for progressive, intermittent low back pain into the LLE radiating to the ankle x 8/2019 without trauma. Her pain is exacerbated with walking. Positive shopping cart sign. She has completed MDP without benefit. She is currently on Cymbalta 90 mg secondary to depression. Patient failed NEURONTIN 800 mg TID secondary to no relief. Pt underwent left L5 and S1 SNRB on 5/28/2020 with good relief. Pt underwent left-sided L5 and S1 SNRB on 1/21/2021 with relief. Pt denies change in bowel or bladder habits. Pt denies fever, weight loss, or skin changes. Pt denies any signs of weakness. Patient denies previous spinal surgery or physical therapy/chiropractic care. Pt denies h/o chemotherapy, gastric bypass, alcohol abuse, or DM. PmHx of depression. Note from Dr. Megan Camarena patient was seen with c/o pain and burning in the left leg.  Indicated patient was on Cymbalta 60 mg and Neurontin 300 mg BID at that time. Note from Dr. Elvin Rich patient reports no change since last visit. Indicated she endorsed better benefit with the qhs dose of Neurontin. Indicated they increased her Neurontin to 600 mg TID and started her on a MDP at that time. Note from Dr. Flora Harden 7/9/2020 indicating patient was seen with c/o right shoulder pain. Her pain is worse with overhead activity and at night. Indicated she has OA of the right shoulder. L Spine MRI dated 2/10/2020 films reviewed. Per report, grade 1 anterolistheses at L4-5 and L5-S1. Mild spinal canal stenosis at L2-3. Moderate spinal canal stenoses from L3-4 to L5-S1. Mild foraminal stenoses at L3-4 and L4-5. Moderate to severe bilateral foraminal stenoses at L5-S1 with potential L5 nerve root compression. Complex left renal cyst with fluid-hemorrhage level. Other small simple renal cysts.  LLE EMG dated 1/17/2020 suggested: sensorimotor peripheral neuropathy and chronic L5 and S1 radiculopathy. At her last clinical appointment, I offered PT, pt deferred. I increased her Lyrica from 100 mg BID to 150 mg BID. At today's telephone consultation, the patient reports pain location and distribution remains unchanged. She rates her pain 2/10, previously 3/10. Her LLE radicular symptoms are more intermittent and less intense in nature. Her pain is exacerbated by standing. Her pain is relieved with sitting. She is tolerating the increased dose of Lyrica 150 mg BID with some benefit. Pt denies change in bowel or bladder habits. Pt denies any signs of weakness.  reviewed. There is no height or weight on file to calculate BMI.     PCP: Malik Guerrero MD      Past Medical History:   Diagnosis Date    Allergic rhinitis     on allergy shots    Anemia     Asthma     Borderline diabetic     Cataract     Chronic lung disease     Cigarette smoker     abuse quit 2006    COPD [facility]chronic obstructive pulmonary disease) ([facility] [address]     Degenerative arthritis     both shoulders    Depression     Easy bruising     Hypertension     Nervousness     Osteoporosis 10/27/99    Personal history of smoking 9/10/2020    Pharyngoesophageal dysphagia 9/10/2020        Social History     Socioeconomic History    Marital status:      Spouse name: Not on file    Number of children: Not on file    Years of education: Not on file    Highest education level: Not on file   Occupational History    Not on file   Social Needs    Financial resource strain: Not on file    Food insecurity     Worry: Not on file     Inability: Not on file    Transportation needs     Medical: Not on file     Non-medical: Not on file   Tobacco Use    Smoking status: Former Smoker     Packs/day: 2.50     Years: 45.00     Pack years: 112.50     Types: Cigarettes     Start date: 9/10/1959     Quit date: 1/1/2006     Years since quitting: 15.2    Smokeless tobacco: Never Used    Tobacco comment: smoked for 50 years   Substance and Sexual Activity    Alcohol use:  Yes     Alcohol/week: 2.0 - 3.0 standard drinks     Types: 2 - 3 Standard drinks or equivalent per week     Comment: occ    Drug use: No    Sexual activity: Not Currently   Lifestyle    Physical activity     Days per week: Not on file     Minutes per session: Not on file    Stress: Not on file   Relationships    Social connections     Talks on phone: Not on file     Gets together: Not on file     Attends [employer] service: Not on file     Active member of club or organization: Not on file     Attends meetings of clubs or organizations: Not on file     Relationship status: Not on file    Intimate partner violence     Fear of current or ex partner: Not on file     Emotionally abused: Not on file     Physically abused: Not on file     Forced sexual activity: Not on file   Other Topics Concern    Not on file   Social History Narrative    Not on file       Current Outpatient Medications   Medication Sig Dispense Refill    albuterol (Ventolin HFA) 90 mcg/actuation inhaler Take 2 Puffs by inhalation every four (4) hours as needed for Wheezing. 1 Inhaler 2    doxycycline (VIBRAMYCIN) 100 mg capsule Take 1 Cap by mouth two (2) times a day. 20 Cap 0    tiotropium bromide (Spiriva Respimat) 2.5 mcg/actuation inhaler Take 2 Puffs by inhalation daily. 2 Inhaler 0    pregabalin (Lyrica) 150 mg capsule Take 1 Cap by mouth two (2) times a day. Max Daily Amount: 300 mg. 60 Cap 1    formoterol (Perforomist) 20 mcg/2 mL nebu neb solution 2 mL by Nebulization route once over twenty-four (24) hours. Diag. Code: J44.9, J45.20, R06.02  File under Medicare B 30 Each 0    arformoteroL (Brovana) 15 mcg/2 mL nebu neb solution 2 mL by Nebulization route two (2) times a day. J44.9. Indications: severe copd 60 Vial 3    albuterol sulfate (PROVENTIL;VENTOLIN) 2.5 mg/0.5 mL nebu nebulizer solution 2.5 mg by Nebulization route every four (4) hours as needed for Wheezing or Shortness of Breath. [employer]      meloxicam (MOBIC) 15 mg tablet Take 15 mg by mouth as needed.  lisinopril (PRINIVIL, ZESTRIL) 10 mg tablet TAKE 1 TABLET BY MOUTH DAILY 30 Tab 0    sertraline (ZOLOFT) 100 mg tablet TAKE 1 TABLET BY MOUTH EVERY DAY 30 Tab 6    buPROPion SR (WELLBUTRIN SR) 100 mg SR tablet       tiotropium bromide (Spiriva Respimat) 2.5 mcg/actuation inhaler Take 2 Puffs by inhalation daily. 2 Inhaler 0    pregabalin (LYRICA) 100 mg capsule TAKE ONE CAPSULE BY MOUTH TWICE A DAY. MAX DAILY AMOUNT: 200MG 60 Cap 0    pregabalin (LYRICA) 50 mg capsule Take 1 Cap by mouth two (2) times a day. Max Daily Amount: 100 mg.  (Patient taking differently: Take 100 mg by mouth two (2) times a day.) 180 Cap 0    gabapentin (Neurontin) 400 mg capsule Take 1 cap tid x 1 week, then 1 cap bid x 1 week, then 1 cap every day x 1 week and then stop (Patient not taking: Reported on 12/2/2020) 42 Cap 0    pregabalin (LYRICA) 50 mg capsule Take 1 Cap by mouth two (2) times a day. Max Daily Amount: 100 mg. 60 Cap 1    pregabalin (LYRICA) 50 mg capsule Take 1 Cap by mouth two (2) times a day. Max Daily Amount: 100 mg. 60 Cap 1    DULoxetine (CYMBALTA) 20 mg capsule Take 90 mg by mouth daily.  pravastatin (PRAVACHOL) 20 mg tablet       gabapentin (NEURONTIN) 100 mg capsule 600 mg.  cyclobenzaprine (FLEXERIL) 5 mg tablet Take 5 mg by mouth three (3) times daily as needed for Muscle Spasm(s).  diazepam (VALIUM) 5 mg tablet Take 1 Tab by mouth two (2) times daily as needed for Anxiety. 60 Tab 2       Allergies   Allergen Reactions    Qvar [Beclomethasone Dipropionate] Shortness of Breath    Entex [Phenylephrine-Guaifenesin] Other (comments)     intolerance    Sulfa (Sulfonamide Antibiotics) Other (comments)          PHYSICAL EXAMINATION  Unable to perform examination secondary to COVID-19. CONSTITUTIONAL: NAD, A&O x 3    ASSESSMENT   Diagnoses and all orders for this visit:    1. Lumbosacral spondylosis without myelopathy    2. Spinal stenosis of lumbar region, unspecified whether neurogenic claudication present    3. Lumbar neuritis    4. DDD (degenerative disc disease), lumbar    5. Idiopathic peripheral neuropathy    6. Spondylolisthesis, acquired        IMPRESSION AND PLAN:  The patient consented to the tele health visit and was aware that there would be a charge. During today's telephone consultation patient had c/o left-sided lower back pain radiating into the LLE in a S1 distribution to the ankle. Multiple treatment options were discussed. She is not interested in additional blocks at this time. I offered PT, pt declined due to transportation complications. I will increase her Lyrica from 150 mg BID to 225 mg BID. Patient advised to call the office if intolerant to higher dose. Pt appears to be neurologically intact. I will see the patient back in 1 month's time or earlier if needed.      Written by Georgetta Brunner, as dictated by Rajeev Kellogg MD  I examined the patient, reviewed and agree with the note.

## 2021-03-17 ENCOUNTER — TELEPHONE (OUTPATIENT)
Dept: PULMONOLOGY | Age: 76
End: 2021-03-17

## 2021-03-17 DIAGNOSIS — Z87.891 PERSONAL HISTORY OF SMOKING: Primary | ICD-10-CM

## 2021-03-17 NOTE — TELEPHONE ENCOUNTER
[phone] Wants to talk to [facility] regarding financial assistance forms for Spiriva. Please call back.

## 2021-03-18 ENCOUNTER — VIRTUAL VISIT (OUTPATIENT)
Dept: ORTHOPEDIC SURGERY | Age: 76
End: 2021-03-18
Payer: MEDICARE

## 2021-03-18 DIAGNOSIS — M47.817 LUMBOSACRAL SPONDYLOSIS WITHOUT MYELOPATHY: Primary | ICD-10-CM

## 2021-03-18 DIAGNOSIS — M48.061 SPINAL STENOSIS OF LUMBAR REGION, UNSPECIFIED WHETHER NEUROGENIC CLAUDICATION PRESENT: ICD-10-CM

## 2021-03-18 DIAGNOSIS — G60.9 IDIOPATHIC PERIPHERAL NEUROPATHY: ICD-10-CM

## 2021-03-18 DIAGNOSIS — M54.16 LUMBAR NEURITIS: ICD-10-CM

## 2021-03-18 DIAGNOSIS — M51.36 DDD (DEGENERATIVE DISC DISEASE), LUMBAR: ICD-10-CM

## 2021-03-18 DIAGNOSIS — M43.10 SPONDYLOLISTHESIS, ACQUIRED: ICD-10-CM

## 2021-03-18 PROCEDURE — G8419 CALC BMI OUT NRM PARAM NOF/U: HCPCS | Performed by: PHYSICAL MEDICINE & REHABILITATION

## 2021-03-18 PROCEDURE — G8427 DOCREV CUR MEDS BY ELIG CLIN: HCPCS | Performed by: PHYSICAL MEDICINE & REHABILITATION

## 2021-03-18 PROCEDURE — 99213 OFFICE O/P EST LOW 20 MIN: CPT | Performed by: PHYSICAL MEDICINE & REHABILITATION

## 2021-03-18 PROCEDURE — 1101F PT FALLS ASSESS-DOCD LE1/YR: CPT | Performed by: PHYSICAL MEDICINE & REHABILITATION

## 2021-03-18 PROCEDURE — G8432 DEP SCR NOT DOC, RNG: HCPCS | Performed by: PHYSICAL MEDICINE & REHABILITATION

## 2021-03-18 PROCEDURE — G8536 NO DOC ELDER MAL SCRN: HCPCS | Performed by: PHYSICAL MEDICINE & REHABILITATION

## 2021-03-18 PROCEDURE — 1090F PRES/ABSN URINE INCON ASSESS: CPT | Performed by: PHYSICAL MEDICINE & REHABILITATION

## 2021-03-18 RX ORDER — PREGABALIN 225 MG/1
225 CAPSULE ORAL 2 TIMES DAILY
Qty: 60 CAP | Refills: 1 | Status: SHIPPED | OUTPATIENT
Start: 2021-03-18 | End: 2021-05-24

## 2021-03-18 RX ORDER — BUPROPION HYDROCHLORIDE 100 MG/1
TABLET, EXTENDED RELEASE ORAL
COMMUNITY
Start: 2021-03-17

## 2021-03-26 ENCOUNTER — HOSPITAL ENCOUNTER (OUTPATIENT)
Dept: CT IMAGING | Age: 76
End: 2021-03-26
Attending: INTERNAL MEDICINE

## 2021-04-06 ENCOUNTER — TELEPHONE (OUTPATIENT)
Dept: ORTHOPEDIC SURGERY | Age: 76
End: 2021-04-06

## 2021-04-06 DIAGNOSIS — M54.16 LUMBAR NEURITIS: ICD-10-CM

## 2021-04-06 DIAGNOSIS — M48.061 SPINAL STENOSIS OF LUMBAR REGION, UNSPECIFIED WHETHER NEUROGENIC CLAUDICATION PRESENT: ICD-10-CM

## 2021-04-06 DIAGNOSIS — M47.817 LUMBOSACRAL SPONDYLOSIS WITHOUT MYELOPATHY: Primary | ICD-10-CM

## 2021-04-06 NOTE — TELEPHONE ENCOUNTER
Per Dr Deyanira Sim note:  \"Pt underwent left-sided L5 and S1 SNRB on 1/21/2021 with relief\"    Check with Dr Mosquera December to see if he wants to redo this block.   If so, please pend for him to sign

## 2021-04-09 NOTE — TELEPHONE ENCOUNTER
Called patients vinod [place]) [phone] and verified patients mothers date of birth. I apologized in the delay of a return call. I informed her that the approved another injection and inquired about which facility should would like to go to. She stated same as before. [place], valium, she is diabetic and she is not on any blood thinners but she does take Meloxicam. I informed her to have her stop taking the Meloxicam if she is trying to get scheduled next week. She stated Dr. James Huddleston told her she needed to have it done before the end of the month. I inquired what insurance does her mother have and she stated Medicare. I informed her that I will forward this information to our schedulers and one of them will narcisa her back on Monday with the date and time. She verbalized understanding. Please call patients daughter at [phone] and get her set up. The order has been entered. Thank you.

## 2021-04-13 DIAGNOSIS — M48.061 SPINAL STENOSIS OF LUMBAR REGION, UNSPECIFIED WHETHER NEUROGENIC CLAUDICATION PRESENT: ICD-10-CM

## 2021-04-13 DIAGNOSIS — M47.817 LUMBOSACRAL SPONDYLOSIS WITHOUT MYELOPATHY: ICD-10-CM

## 2021-04-13 DIAGNOSIS — M54.16 LUMBAR NEURITIS: ICD-10-CM

## 2021-04-13 NOTE — TELEPHONE ENCOUNTER
Patient's daughter called again to follow up with previous encounter in regards to getting the patient scheduled for the block/injection.  She is requesting narcisa back at [phone]

## 2021-04-14 ENCOUNTER — TELEPHONE (OUTPATIENT)
Dept: PULMONOLOGY | Age: 76
End: 2021-04-14

## 2021-04-14 RX ORDER — ARFORMOTEROL TARTRATE 15 UG/2ML
SOLUTION RESPIRATORY (INHALATION)
Qty: 120 VIAL | Refills: 3 | Status: SHIPPED | OUTPATIENT
Start: 2021-04-14 | End: 2021-04-14 | Stop reason: SDUPTHER

## 2021-04-14 RX ORDER — ARFORMOTEROL TARTRATE 15 UG/2ML
SOLUTION RESPIRATORY (INHALATION)
Qty: 120 VIAL | Refills: 3 | Status: SHIPPED | OUTPATIENT
Start: 2021-04-14 | End: 2021-08-13

## 2021-04-14 NOTE — TELEPHONE ENCOUNTER
Pt's daughter [id number]([phone]). Pt needs script for [facility] sent to [facility] [phone].

## 2021-04-15 ENCOUNTER — APPOINTMENT (OUTPATIENT)
Dept: GENERAL RADIOLOGY | Age: 76
End: 2021-04-15
Attending: EMERGENCY MEDICINE
Payer: MEDICARE

## 2021-04-15 ENCOUNTER — HOSPITAL ENCOUNTER (EMERGENCY)
Age: 76
Discharge: HOME | End: 2021-04-15
Attending: EMERGENCY MEDICINE
Payer: MEDICARE

## 2021-04-15 VITALS
TEMPERATURE: 98.8 F | OXYGEN SATURATION: 94 % | BODY MASS INDEX: 26.66 KG/M2 | WEIGHT: 160 LBS | DIASTOLIC BLOOD PRESSURE: 88 MMHG | SYSTOLIC BLOOD PRESSURE: 124 MMHG | HEIGHT: 65 IN | HEART RATE: 102 BPM | RESPIRATION RATE: 18 BRPM

## 2021-04-15 DIAGNOSIS — S22.41XA MULTIPLE FRACTURES OF RIBS, RIGHT SIDE, INITIAL ENCOUNTER FOR CLOSED FRACTURE: ICD-10-CM

## 2021-04-15 DIAGNOSIS — S42.031A CLOSED DISPLACED FRACTURE OF ACROMIAL END OF RIGHT CLAVICLE, INITIAL ENCOUNTER: Primary | ICD-10-CM

## 2021-04-15 PROCEDURE — 71101 X-RAY EXAM UNILAT RIBS/CHEST: CPT

## 2021-04-15 PROCEDURE — 99282 EMERGENCY DEPT VISIT SF MDM: CPT

## 2021-04-15 PROCEDURE — 73030 X-RAY EXAM OF SHOULDER: CPT

## 2021-04-15 RX ORDER — NAPROXEN 500 MG/1
TABLET ORAL
Qty: 20 TAB | Refills: 0 | Status: SHIPPED | OUTPATIENT
Start: 2021-04-15

## 2021-04-15 NOTE — ED NOTES
Daughter gave this patient a tramadol 50mg around 7 am this am. Patient states that it helped a little but pain has not gone away it is an 8/10.  Provided patient with an ice pack

## 2021-04-15 NOTE — ED TRIAGE NOTES
Patient states she fell out of bed last night. She turned in the bed and didn't realize how close she was to the edge. She fell onto right shoulder. She c/o right shoulder pain and right sided rib pain. She denies hitting her head.

## 2021-04-15 NOTE — ED NOTES
Patient still has ice pack on right shoulder, states it feels better.   Offered to ask MD for Tylenol and she said no

## 2021-04-15 NOTE — ED PROVIDER NOTES
HPI   Patient says she rolled over during her sleep last night and fell out of bed. She landed on her right shoulder and right lateral ribs. This morning when she woke up she noticed pain in the shoulder and the ribs. She says pain is worse when she rotates or moves her shoulder and is better when she holds still. She says her ribs are sore and hurts when she touches them or takes a very deep breath. She denies any head or neck trauma she denies any back pain. She denies any other complaints or symptoms at this time. She says her daughter gave her a tramadol pill earlier this morning for pain control but she has had no other pain medication this morning. No other complaints given at this time.     Past Medical History:   Diagnosis Date    Allergic rhinitis     on allergy shots    Anemia     Asthma     Borderline diabetic     Cataract     Chronic lung disease     Cigarette smoker     abuse quit 2006    [facility]     Degenerative arthritis     both shoulders    Depression     Easy bruising     Hypertension     Nervousness     Osteoporosis 10/27/99    Personal history of smoking 9/10/2020    Pharyngoesophageal dysphagia 9/10/2020       Past Surgical History:   Procedure Laterality Date    HX BACK SURGERY      nerve block    HX GYN  1974    hysterectomy    HX GYN  1970    D & C    HX HEENT  age 3    tonsillectomy    HX ORTHOPAEDIC  age 21    right leg tumor removed benign         Family History:   Problem Relation Age of Onset   Etheleen Daunt Arthritis-rheumatoid Mother     Hypertension Mother     Headache Mother     Elevated Lipids Mother     Lung Disease Father     Cancer Maternal Aunt     Lung Disease Maternal Uncle        Social History     Socioeconomic History    Marital status:      Spouse name: Not on file    Number of children: Not on file    Years of education: Not on file    Highest education level: Not on file   Occupational History  Not on file   Social Needs    Financial resource strain: Not on file    Food insecurity     Worry: Not on file     Inability: Not on file    Transportation needs     Medical: Not on file     Non-medical: Not on file   Tobacco Use    Smoking status: Former Smoker     Packs/day: 2.50     Years: 45.00     Pack years: 112.50     Types: Cigarettes     Start date: 9/10/1959     Quit date: 1/1/2006     Years since quitting: 15.2    Smokeless tobacco: Never Used    Tobacco comment: smoked for 50 years   Substance and Sexual Activity    Alcohol use: Yes     Alcohol/week: 2.0 - 3.0 standard drinks     Types: 2 - 3 Standard drinks or equivalent per week     Comment: occ    Drug use: No    Sexual activity: Not Currently   Lifestyle    Physical activity     Days per week: Not on file     Minutes per session: Not on file    Stress: Not on file   Relationships    Social connections     Talks on phone: Not on file     Gets together: Not on file     Attends [employer] service: Not on file     Active member of club or organization: Not on file     Attends meetings of clubs or organizations: Not on file     Relationship status: Not on file    Intimate partner violence     Fear of current or ex partner: Not on file     Emotionally abused: Not on file     Physically abused: Not on file     Forced sexual activity: Not on file   Other Topics Concern    Not on file   Social History Narrative    Not on file         ALLERGIES: Qvar [beclomethasone dipropionate], Entex [phenylephrine-guaifenesin], and Sulfa (sulfonamide antibiotics)    Review of Systems   Constitutional: Negative. HENT: Negative. Eyes: Negative. Respiratory: Negative. Cardiovascular: Negative. Gastrointestinal: Negative. Genitourinary: Negative. Musculoskeletal: Positive for arthralgias. Skin: Negative. Neurological: Negative. Psychiatric/Behavioral: Negative.         Vitals:    04/15/21 1132   BP: 124/88   Pulse: (!) 102   Resp: 18   Temp: 98.8 °F (37.1 °C)   SpO2: 94%   Weight: 72.6 kg (160 lb)   Height: 5' 5\" (1.651 m)            Physical Exam  Vitals signs and nursing note reviewed. Constitutional:       Appearance: She is well-developed. HENT:      Head: Normocephalic and atraumatic. Nose: Nose normal.      Mouth/Throat:      Mouth: Mucous membranes are moist.   Eyes:      Conjunctiva/sclera: Conjunctivae normal.      Pupils: Pupils are equal, round, and reactive to light. Neck:      Musculoskeletal: Normal range of motion and neck supple. Cardiovascular:      Rate and Rhythm: Normal rate and regular rhythm. Pulmonary:      Effort: Pulmonary effort is normal.      Breath sounds: Normal breath sounds. Abdominal:      Palpations: Abdomen is soft. Musculoskeletal:         General: Tenderness present. Comments: Right shoulder is mildly tender to palpation across the top of the humeral head. No clavicle pain palpation. Passive range of motion is intact in the right shoulder without crepitus. Right rib cage is mildly tender to palpation over the lateral ribs in the mid axillary line. No swelling induration or skin changes noted. Skin:     General: Skin is warm and dry. Neurological:      Mental Status: She is alert and oriented to person, place, and time. Psychiatric:         Mood and Affect: Mood normal.          MDM  Number of Diagnoses or Management Options  Diagnosis management comments: I reviewed the results of the x-rays with both the patient and her daughter. They understand and agree with the disposition and follow-up plan. Patient asked that she be referred to her orthopedic surgeon who she has seen in the past for shoulder problems.   Call Ashish Overton MD 12:40 PM         Procedures

## 2021-04-20 ENCOUNTER — OFFICE VISIT (OUTPATIENT)
Dept: ORTHOPEDIC SURGERY | Age: 76
End: 2021-04-20
Payer: MEDICARE

## 2021-04-20 VITALS
WEIGHT: 175.4 LBS | OXYGEN SATURATION: 97 % | HEART RATE: 91 BPM | BODY MASS INDEX: 29.22 KG/M2 | HEIGHT: 65 IN | TEMPERATURE: 98.2 F

## 2021-04-20 DIAGNOSIS — S42.031A CLOSED DISPLACED FRACTURE OF ACROMIAL END OF RIGHT CLAVICLE, INITIAL ENCOUNTER: Primary | ICD-10-CM

## 2021-04-20 PROCEDURE — G8419 CALC BMI OUT NRM PARAM NOF/U: HCPCS | Performed by: ORTHOPAEDIC SURGERY

## 2021-04-20 PROCEDURE — G8510 SCR DEP NEG, NO PLAN REQD: HCPCS | Performed by: ORTHOPAEDIC SURGERY

## 2021-04-20 PROCEDURE — 1090F PRES/ABSN URINE INCON ASSESS: CPT | Performed by: ORTHOPAEDIC SURGERY

## 2021-04-20 PROCEDURE — G8536 NO DOC ELDER MAL SCRN: HCPCS | Performed by: ORTHOPAEDIC SURGERY

## 2021-04-20 PROCEDURE — G8427 DOCREV CUR MEDS BY ELIG CLIN: HCPCS | Performed by: ORTHOPAEDIC SURGERY

## 2021-04-20 PROCEDURE — 1101F PT FALLS ASSESS-DOCD LE1/YR: CPT | Performed by: ORTHOPAEDIC SURGERY

## 2021-04-20 PROCEDURE — 99214 OFFICE O/P EST MOD 30 MIN: CPT | Performed by: ORTHOPAEDIC SURGERY

## 2021-04-20 RX ORDER — TRAMADOL HYDROCHLORIDE 50 MG/1
50 TABLET ORAL
Qty: 28 TAB | Refills: 0 | Status: SHIPPED | OUTPATIENT
Start: 2021-04-20 | End: 2021-04-27

## 2021-04-20 NOTE — PROGRESS NOTES
Jeana Sapp  1945   Chief Complaint   Patient presents with    Shoulder Pain     right shoulder        HISTORY OF PRESENT ILLNESS  Jeana Sapp is a 66 y.o. female who presents today for reevaluation of right shoulder. Patient rates pain as 7/10 today. Pt fell out of bed on 4/15/2021 and went to the ED. Fell onto the right shoulder. Presents today wearing a sling. Has tried taking Naproxen with minimal relief. Had a right glenohumeral joint injection in this office on 9/15/2020. Patient denies any fever, chills, chest pain, shortness of breath or calf pain. The remainder of the review of systems is negative. There are no new illness or injuries to report since last seen in the office. There are no changes to medications, allergies, family or social history. Pain Assessment  4/20/2021   Location of Pain Shoulder   Location Modifiers Right   Severity of Pain 7   Quality of Pain Aching;Burning   Quality of Pain Comment -   Duration of Pain Persistent   Duration of Pain Comment -   Frequency of Pain Intermittent   Aggravating Factors Other (Comment)   Aggravating Factors Comment ROM   Limiting Behavior Yes   Relieving Factors Ice; Other (Comment)   Relieving Factors Comment aleve   Result of Injury Yes   Work-Related Injury No   Type of Injury Fall   Type of Injury Comment -       PHYSICAL EXAM:   Visit Vitals  Pulse 91   Temp 98.2 °F (36.8 °C) (Temporal)   Ht 5' 5\" (1.651 m)   Wt 175 lb 6.4 oz (79.6 kg)   LMP 01/01/1974   SpO2 97%   BMI 29.19 kg/m²     The patient is a well-developed, well-nourished female   in no acute distress. The patient is alert and oriented times three. The patient is alert and oriented times three. Mood and affect are normal.  LYMPHATIC: lymph nodes are not enlarged and are within normal limits  SKIN: normal in color and non tender to palpation. There are no bruises or abrasions noted. NEUROLOGICAL: Motor sensory exam is within normal limits.  Reflexes are equal bilaterally. There is normal sensation to pinprick and light touch  MUSCULOSKELETAL:  Examination Right shoulder   Skin Intact   AC joint tenderness -   Biceps tenderness -   Forward flexion/Elevation ROM 60   Active abduction ROM 60   Glenohumeral abduction 45   External rotation ROM 0   Internal rotation ROM 30   Apprehension -   Andrews Relocation -   Jerk -   Load and Shift -   Obriens -   Speeds -   Impingement sign -   Supraspinatus/Empty Can -, 5/5   External Rotation Strength -, 5/5   Lift Off/Belly Press -, 5/5   Neurovascular Intact   Marked tenderness over distal clavicle with swelling     IMAGING: XR of right shoulder with 3 views from [facility] dated 4/15/2021 was reviewed and read by Dr. Gil Foley:   IMPRESSION:  Displaced and comminuted fracture distal right clavicle. Severe degenerative changes right glenohumeral joint. XR of right shoulder from [place] dated 5/29/2020 was reviewed and read by Dr. Gil Foley: Marked degenerative changes in the glenohumeral joint      IMPRESSION:      ICD-10-CM ICD-9-CM    1. Closed displaced fracture of acromial end of right clavicle, initial encounter  S42.031A 810.03 AMB SUPPLY ORDER      traMADoL (ULTRAM) 50 mg tablet        PLAN:   1. Pt presents today with right shoulder pain due to a distal clavicle fracture and she was given a new sling in the office today. Was also given prescription for Tramadol today. Will see her back next week for repeat XR. Risk factors include: htn   2. No ultrasound exam indicated today  3. No cortisone injection indicated today   4. No Physical/Occupational Therapy indicated today  5. No diagnostic test indicated today:   6. Yes durable medical equipment indicated today SLING  7. No referral indicated today   8. Yes medications indicated today: TRAMADOL  9. Yes Narcotic indicated today for short term acute pain secondary to right distal clavicle fracture. Patient given pain medication for short term acute pain relief. Goal is to treat patient according to above plan and to ultimately have patient off all pain medications once appropriate. If chronic pain management is required beyond what is expected for current orthopedic problem, will refer patient to pain management.  was reviewed and will be reviewed with every medication refill request.       RTC next week for repeat XR      Scribed by Enid Jackson) as dictated by Haydee Corona, MD [place], . Haydee Members, confirm that all documentation is accurate.     Haydee Members, M.D.   [facility]

## 2021-04-28 ENCOUNTER — OFFICE VISIT (OUTPATIENT)
Dept: ORTHOPEDIC SURGERY | Age: 76
End: 2021-04-28
Payer: MEDICARE

## 2021-04-28 VITALS
RESPIRATION RATE: 16 BRPM | TEMPERATURE: 96.9 F | WEIGHT: 171 LBS | HEART RATE: 90 BPM | BODY MASS INDEX: 28.49 KG/M2 | HEIGHT: 65 IN | OXYGEN SATURATION: 93 %

## 2021-04-28 DIAGNOSIS — S42.031D CLOSED DISPLACED FRACTURE OF ACROMIAL END OF RIGHT CLAVICLE WITH ROUTINE HEALING, SUBSEQUENT ENCOUNTER: Primary | ICD-10-CM

## 2021-04-28 DIAGNOSIS — M89.8X1 PAIN OF RIGHT CLAVICLE: ICD-10-CM

## 2021-04-28 PROCEDURE — G8419 CALC BMI OUT NRM PARAM NOF/U: HCPCS | Performed by: ORTHOPAEDIC SURGERY

## 2021-04-28 PROCEDURE — 1090F PRES/ABSN URINE INCON ASSESS: CPT | Performed by: ORTHOPAEDIC SURGERY

## 2021-04-28 PROCEDURE — G8427 DOCREV CUR MEDS BY ELIG CLIN: HCPCS | Performed by: ORTHOPAEDIC SURGERY

## 2021-04-28 PROCEDURE — 73000 X-RAY EXAM OF COLLAR BONE: CPT | Performed by: ORTHOPAEDIC SURGERY

## 2021-04-28 PROCEDURE — G8432 DEP SCR NOT DOC, RNG: HCPCS | Performed by: ORTHOPAEDIC SURGERY

## 2021-04-28 PROCEDURE — 1101F PT FALLS ASSESS-DOCD LE1/YR: CPT | Performed by: ORTHOPAEDIC SURGERY

## 2021-04-28 PROCEDURE — G8536 NO DOC ELDER MAL SCRN: HCPCS | Performed by: ORTHOPAEDIC SURGERY

## 2021-04-28 PROCEDURE — 99213 OFFICE O/P EST LOW 20 MIN: CPT | Performed by: ORTHOPAEDIC SURGERY

## 2021-04-28 NOTE — PROGRESS NOTES
Romy Mario  1945   Chief Complaint   Patient presents with    Shoulder Pain     right        HISTORY OF PRESENT ILLNESS  Romy Mario is a 66 y.o. female who presents today for reevaluation of right shoulder. Patient rates pain as 0/10 today. Pt fell out of bed on 4/15/2021 and went to the ED. Fell onto the right shoulder. Presents today wearing a sling. Has tried taking Naproxen. Feels much better since last OV. Patient denies any fever, chills, chest pain, shortness of breath or calf pain. The remainder of the review of systems is negative. There are no new illness or injuries to report since last seen in the office. There are no changes to medications, allergies, family or social history. PHYSICAL EXAM:   Visit Vitals  Pulse 90   Temp 96.9 °F (36.1 °C)   Resp 16   Ht 5' 5\" (1.651 m)   Wt 171 lb (77.6 kg)   LMP 01/01/1974   SpO2 93%   BMI 28.46 kg/m²     The patient is a well-developed, well-nourished female   in no acute distress. The patient is alert and oriented times three. The patient is alert and oriented times three. Mood and affect are normal.  LYMPHATIC: lymph nodes are not enlarged and are within normal limits  SKIN: normal in color and non tender to palpation. There are no bruises or abrasions noted. NEUROLOGICAL: Motor sensory exam is within normal limits. Reflexes are equal bilaterally.  There is normal sensation to pinprick and light touch  MUSCULOSKELETAL:  Examination Right shoulder   Skin Intact   AC joint tenderness -   Biceps tenderness -   Forward flexion/Elevation ROM 60   Active abduction ROM 60   Glenohumeral abduction 45   External rotation ROM 0   Internal rotation ROM 30   Apprehension -   Andrews Relocation -   Jerk -   Load and Shift -   Obriens -   Speeds -   Impingement sign -   Supraspinatus/Empty Can -, 5/5   External Rotation Strength -, 5/5   Lift Off/Belly Press -, 5/5   Neurovascular Intact   Marked tenderness over distal clavicle with swelling       IMAGING: XR of right clavicle with 2 views obtained in the office dated 4/28/2021 was reviewed and read by Dr. Shadi Arias: No change in position of the fracture site         XR of right shoulder with 3 views from [facility] dated 4/15/2021 was reviewed and read by Dr. Shadi Arias:   IMPRESSION:  Displaced and comminuted fracture distal right clavicle. Severe degenerative changes right glenohumeral joint. XR of right shoulder from [place] dated 5/29/2020 was reviewed and read by Dr. Shadi Arias: Marked degenerative changes in the glenohumeral joint      IMPRESSION:      ICD-10-CM ICD-9-CM    1. Closed displaced fracture of acromial end of right clavicle with routine healing, subsequent encounter  S42.031D V54.11    2. Pain of right clavicle  M89.8X1 733.90 AMB POC XRAY; CLAVICLE, COMPLETE        PLAN:   1. Pt presents today with right shoulder pain due to a distal clavicle fracture and the XR taken in the office today shows no change in position at the fracture site. Gradually decrease with use of sling over the next 3 weeks and gradually increase with activities. Advised her to use pain as her guide. Will see her back in 3 weeks. Risk factors include: htn   2. No ultrasound exam indicated today  3. No cortisone injection indicated today   4. No Physical/Occupational Therapy indicated today  5. No diagnostic test indicated today:   6. No durable medical equipment indicated today   7. No referral indicated today   8. No medications indicated today:  9. No Narcotic indicated today        RTC 3 weeks       Scribed by Ezra Hollingshead Brook Crigler) as dictated by Herber Grove MD    I, Dr. Herber Grove, confirm that all documentation is accurate.     Herber Grove M.D.   [facility]

## 2021-05-13 ENCOUNTER — VIRTUAL VISIT (OUTPATIENT)
Dept: ORTHOPEDIC SURGERY | Age: 76
End: 2021-05-13
Payer: MEDICARE

## 2021-05-13 DIAGNOSIS — M51.36 DDD (DEGENERATIVE DISC DISEASE), LUMBAR: ICD-10-CM

## 2021-05-13 DIAGNOSIS — M19.011 GLENOHUMERAL ARTHRITIS, RIGHT: ICD-10-CM

## 2021-05-13 DIAGNOSIS — M47.817 LUMBOSACRAL SPONDYLOSIS WITHOUT MYELOPATHY: Primary | ICD-10-CM

## 2021-05-13 DIAGNOSIS — M54.16 LUMBAR NEURITIS: ICD-10-CM

## 2021-05-13 DIAGNOSIS — M48.061 SPINAL STENOSIS OF LUMBAR REGION, UNSPECIFIED WHETHER NEUROGENIC CLAUDICATION PRESENT: ICD-10-CM

## 2021-05-13 PROCEDURE — 99443 PR PHYS/QHP TELEPHONE EVALUATION 21-30 MIN: CPT | Performed by: NURSE PRACTITIONER

## 2021-05-13 NOTE — PROGRESS NOTES
Alexandria Dawson is a 66 y.o. female who was seen by synchronous (real-time)  technology on 5/13/2021. She has a history of  left-sided lower back pain radiating into the LLE in a S1 distribution to the ankle. She last saw Dr. Rosie Tapia. She has completed MDP without benefit. She is currently on Cymbalta 90 mg secondary to depression. Patient failed NEURONTIN 800 mg TID secondary to no relief. Pt underwent left L5 and S1 SNRB on 5/28/2020 with good relief. Pt underwent left-sided L5 and S1 SNRB on 1/21/2021 with relief. She is taking Lyrica 225 mg BID. She is being seen today for a VV to set up an injection. She continues to have back pain. She has low back pain and pain into her bilateral buttock and runs down into her thighs and into her posterior legs. Denies bladder/bowel dysfunction, saddle paresthesia, weakness, gait disturbance, or other neurological deficit. Pt at this time desires to continue with current care/proceed with medication evaluation/proceed with block eval.    L Spine MRI dated 2/10/2020 films reviewed. Per report, grade 1 anterolistheses at L4-5 and L5-S1. Mild spinal canal stenosis at L2-3. Moderate spinal canal stenoses from L3-4 to L5-S1. Mild foraminal stenoses at L3-4 and L4-5. Moderate to severe bilateral foraminal stenoses at L5-S1 with potential L5 nerve root compression. Complex left renal cyst with fluid-hemorrhage level. Other small simple renal cysts.     LLE EMG dated 1/17/2020 suggested: sensorimotor peripheral neuropathy and chronic L5 and S1 radiculopathy.       ASSESSMENT  66 y.o. female with back pain. Diagnoses and all orders for this visit:    1. Lumbosacral spondylosis without myelopathy    2. Spinal stenosis of lumbar region, unspecified whether neurogenic claudication present    3. DDD (degenerative disc disease), lumbar    4. Glenohumeral arthritis, right    5. Lumbar neuritis         IMPRESSION/PLAN    1) Pt was given information on back exercises.    2) cont lyrica  3) schedule  KEZIA L4/5  4) Ms. Magnolia Chavez has a reminder for a \"due or due soon\" health maintenance. I have asked that she contact her primary care provider, Pierre Lyles MD, for follow-up on this health maintenance. 5) We have informed patient to notify us for immediate appointment if he has any worsening neurogical symptoms or if an emergency situation presents, then call 911  6) Pt will follow-up in 6 weeks for block FU. Risks and benefits of ongoing therapy have been reviewed with the patient.  is appropriate. . No pain behaviors. Denies thoughts of harming self or others. Pt has a good risk to benefit ratio which allows the pt to function in a home environment without side effects. Assessment & Plan:   Diagnoses and all orders for this visit:    1. Lumbosacral spondylosis without myelopathy    2. Spinal stenosis of lumbar region, unspecified whether neurogenic claudication present    3. DDD (degenerative disc disease), lumbar    4. Glenohumeral arthritis, right    5. Lumbar neuritis              Subjective:   Lizette Monreal was seen for No chief complaint on file.         PAST MEDICAL HISTORY  Past Medical History:   Diagnosis Date    Allergic rhinitis     on allergy shots    Anemia     Asthma     Borderline diabetic     Cataract     Chronic lung disease     Cigarette smoker     abuse quit 2006    COPD (chronic obstructive pulmonary disease) (HCC)     Degenerative arthritis     both shoulders    Depression     Easy bruising     Hypertension     Nervousness     Osteoporosis 10/27/99    Personal history of smoking 9/10/2020    Pharyngoesophageal dysphagia 9/10/2020        MEDICATIONS  Current Outpatient Medications   Medication Sig Dispense Refill    naproxen (Naprosyn) 500 mg tablet 1 tablet by mouth every 8-12 hours as needed for shoulder pain 20 Tab 0    arformoteroL (Brovana) 15 mcg/2 mL nebu neb solution INHALE CONTENTS OF 1 VIAL BY MOUTH TWICE A DAY, IDC code J44.9 file under Medicare Part B 120 Vial 3    buPROPion SR (WELLBUTRIN SR) 100 mg SR tablet       pregabalin (LYRICA) 225 mg capsule Take 1 Cap by mouth two (2) times a day. Max Daily Amount: 450 mg. 60 Cap 1    tiotropium bromide (Spiriva Respimat) 2.5 mcg/actuation inhaler Take 2 Puffs by inhalation daily. 2 Inhaler 0    albuterol (Ventolin HFA) 90 mcg/actuation inhaler Take 2 Puffs by inhalation every four (4) hours as needed for Wheezing. 1 Inhaler 2    doxycycline (VIBRAMYCIN) 100 mg capsule Take 1 Cap by mouth two (2) times a day. 20 Cap 0    tiotropium bromide (Spiriva Respimat) 2.5 mcg/actuation inhaler Take 2 Puffs by inhalation daily. 2 Inhaler 0    pregabalin (Lyrica) 150 mg capsule Take 1 Cap by mouth two (2) times a day. Max Daily Amount: 300 mg. 60 Cap 1    pregabalin (LYRICA) 100 mg capsule TAKE ONE CAPSULE BY MOUTH TWICE A DAY. MAX DAILY AMOUNT: 200MG 60 Cap 0    pregabalin (LYRICA) 50 mg capsule Take 1 Cap by mouth two (2) times a day. Max Daily Amount: 100 mg. (Patient taking differently: Take 100 mg by mouth two (2) times a day.) 180 Cap 0    albuterol sulfate (PROVENTIL;VENTOLIN) 2.5 mg/0.5 mL nebu nebulizer solution 2.5 mg by Nebulization route every four (4) hours as needed for Wheezing or Shortness of Breath. [employer]      gabapentin (Neurontin) 400 mg capsule Take 1 cap tid x 1 week, then 1 cap bid x 1 week, then 1 cap every day x 1 week and then stop 42 Cap 0    pregabalin (LYRICA) 50 mg capsule Take 1 Cap by mouth two (2) times a day. Max Daily Amount: 100 mg. 60 Cap 1    pregabalin (LYRICA) 50 mg capsule Take 1 Cap by mouth two (2) times a day. Max Daily Amount: 100 mg. 60 Cap 1    meloxicam (MOBIC) 15 mg tablet Take 15 mg by mouth as needed.  DULoxetine (CYMBALTA) 20 mg capsule Take 90 mg by mouth daily.  pravastatin (PRAVACHOL) 20 mg tablet       gabapentin (NEURONTIN) 100 mg capsule 600 mg.       cyclobenzaprine (FLEXERIL) 5 mg tablet Take 5 mg by mouth three (3) times daily as needed for Muscle Spasm(s).  lisinopril (PRINIVIL, ZESTRIL) 10 mg tablet TAKE 1 TABLET BY MOUTH DAILY 30 Tab 0    sertraline (ZOLOFT) 100 mg tablet TAKE 1 TABLET BY MOUTH EVERY DAY 30 Tab 6    diazepam (VALIUM) 5 mg tablet Take 1 Tab by mouth two (2) times daily as needed for Anxiety. 60 Tab 2       ALLERGIES  Allergies   Allergen Reactions    Qvar [Beclomethasone Dipropionate] Shortness of Breath    Entex [Phenylephrine-Guaifenesin] Other (comments)     intolerance    Sulfa (Sulfonamide Antibiotics) Other (comments)       SOCIAL HISTORY    Social History     Socioeconomic History    Marital status:      Spouse name: Not on file    Number of children: Not on file    Years of education: Not on file    Highest education level: Not on file   Occupational History    Not on file   Social Needs    Financial resource strain: Not on file    Food insecurity     Worry: Not on file     Inability: Not on file    Transportation needs     Medical: Not on file     Non-medical: Not on file   Tobacco Use    Smoking status: Former Smoker     Packs/day: 2.50     Years: 45.00     Pack years: 112.50     Types: Cigarettes     Start date: 9/10/1959     Quit date: 1/1/2006     Years since quitting: 15.3    Smokeless tobacco: Never Used    Tobacco comment: smoked for 50 years   Substance and Sexual Activity    Alcohol use:  Yes     Alcohol/week: 2.0 - 3.0 standard drinks     Types: 2 - 3 Standard drinks or equivalent per week     Comment: occ    Drug use: No    Sexual activity: Not Currently   Lifestyle    Physical activity     Days per week: Not on file     Minutes per session: Not on file    Stress: Not on file   Relationships    Social connections     Talks on phone: Not on file     Gets together: Not on file     Attends [employer] service: Not on file     Active member of club or organization: Not on file     Attends meetings of clubs or organizations: Not on file     Relationship status: Not on file    Intimate partner violence     Fear of current or ex partner: Not on file     Emotionally abused: Not on file     Physically abused: Not on file     Forced sexual activity: Not on file   Other Topics Concern    Not on file   Social History Narrative    Not on file       Pain Scale: /10    Pain Assessment  4/28/2021   Location of Pain Shoulder   Location Modifiers Right   Severity of Pain 0   Quality of Pain -   Quality of Pain Comment -   Duration of Pain -   Duration of Pain Comment -   Frequency of Pain -   Aggravating Factors -   Aggravating Factors Comment -   Limiting Behavior -   Relieving Factors -   Relieving Factors Comment -   Result of Injury -   Work-Related Injury -   Type of Injury -   Type of Injury Comment -         ROS      Objective:       Visit Vitals  LMP 01/01/1974        [INSTRUCTIONS:  \"[x]\" Indicates a positive item  \"[]\" Indicates a negative item  -- DELETE ALL ITEMS NOT EXAMINED]    Constitutional: [x] Appears well-developed and well-nourished [x] No apparent distress      [] Abnormal -     Mental status: [x] Alert and awake  [x] Oriented to person/place/time [x] Able to follow commands    [] Abnormal -     Eyes:   EOM    [x]  Normal    [] Abnormal -   Sclera  [x]  Normal    [] Abnormal -          Discharge [x]  None visible   [] Abnormal -     HENT: [x] Normocephalic, atraumatic  [] Abnormal -     Neck: [x] No visualized mass [] Abnormal -     Pulmonary/Chest: [x] Respiratory effort normal   [x] No visualized signs of difficulty breathing or respiratory distress        [] Abnormal -      Musculoskeletal:   [x] Normal gait with no signs of ataxia         [x] Normal range of motion of neck        [] Abnormal -          Neurological:        [x] No Facial Asymmetry (Cranial nerve 7 motor function) (limited exam due to video visit)          [x] No gaze palsy        [] Abnormal -          Skin:        [x] No significant exanthematous lesions or discoloration noted on facial skin [] Abnormal -                            Psychiatric:       [x] Normal Affect [] Abnormal -        [x] No Hallucinations        Due to this being a TeleHealth evaluation, many elements of the physical examination are unable to be assessed. We discussed the expected course, resolution and complications of the diagnosis(es) in detail. Medication risks, benefits, costs, interactions, and alternatives were discussed as indicated. I advised her to contact the office if her condition worsens, changes or fails to improve as anticipated. She expressed understanding with the diagnosis(es) and plan. Consent: Kaye Ritchie, who was seen by synchronous (real-time) technology, and/or her healthcare decision maker, is aware that this patient-initiated, Telehealth encounter on 5/13/2021 is a billable service, with coverage as determined by her insurance carrier. She is aware that she may receive a bill and has provided verbal consent to proceed: Yes. Kaye Ritchie is a 66 y.o. female who was evaluated by a synchronous (real-time) technology encounter for concerns as above. Patient identification was verified prior to start of the visit. A caregiver was present when appropriate. Due to this being a TeleHealth encounter (During VIWNR-39 public health emergency), evaluation of the following organ systems was limited: Vitals/Constitutional/EENT/Resp/CV/GI//MS/Neuro/Skin/Heme-Lymph-Imm. Pursuant to the emergency declaration under the [facility], 1135 waiver authority and the [employer] General Act, this Virtual  Visit was conducted, with patient's consent, to reduce the patient's risk of exposure to COVID-19 and provide continuity of care for an established patient. Services were provided through real time synchronous discussion virtually to substitute for in-person clinic visit.  Patient verbally consented to this type of visit and that they might get a bill from the billing of this visit. This service was provided through doxy me and then telephone as the volume went out, the patient was located at home and  the provider was located at office . There was the patient and her daughter participating in the service. Start time [phone]  End [id number].      Ramiro Casey NP

## 2021-05-24 DIAGNOSIS — M47.817 LUMBOSACRAL SPONDYLOSIS WITHOUT MYELOPATHY: ICD-10-CM

## 2021-05-24 DIAGNOSIS — G60.9 IDIOPATHIC PERIPHERAL NEUROPATHY: ICD-10-CM

## 2021-05-24 DIAGNOSIS — M51.36 DDD (DEGENERATIVE DISC DISEASE), LUMBAR: ICD-10-CM

## 2021-05-24 DIAGNOSIS — M48.061 SPINAL STENOSIS OF LUMBAR REGION, UNSPECIFIED WHETHER NEUROGENIC CLAUDICATION PRESENT: ICD-10-CM

## 2021-05-24 DIAGNOSIS — M43.10 SPONDYLOLISTHESIS, ACQUIRED: ICD-10-CM

## 2021-05-24 DIAGNOSIS — M54.16 LUMBAR NEURITIS: ICD-10-CM

## 2021-05-24 RX ORDER — PREGABALIN 225 MG/1
CAPSULE ORAL
Qty: 60 CAPSULE | Refills: 1 | Status: SHIPPED | OUTPATIENT
Start: 2021-05-24 | End: 2022-05-27

## 2021-05-24 NOTE — TELEPHONE ENCOUNTER
Patient states she is out of medication    Last Visit: 3/18/21 with MD Agnes Nicholas  Next Appointment: 6/17/21 with MD Agnes Nicholas  Previous Refill Encounter(s): 3/18/21 #60 with 1 refill    Requested Prescriptions     Pending Prescriptions Disp Refills    pregabalin (LYRICA) 225 mg capsule [Pharmacy Med Name: PREGABALIN 225 MG CAPSULE] 60 Capsule 1     Sig: TAKE ONE CAPSULE BY MOUTH TWICE A DAY. MAX DAILY AMOUNT: 450 MG.

## 2021-06-03 ENCOUNTER — APPOINTMENT (OUTPATIENT)
Dept: GENERAL RADIOLOGY | Age: 76
End: 2021-06-03
Attending: PHYSICAL MEDICINE & REHABILITATION
Payer: MEDICARE

## 2021-06-03 ENCOUNTER — HOSPITAL ENCOUNTER (OUTPATIENT)
Age: 76
Setting detail: OUTPATIENT SURGERY
Discharge: HOME | End: 2021-06-03
Attending: PHYSICAL MEDICINE & REHABILITATION | Admitting: PHYSICAL MEDICINE & REHABILITATION
Payer: MEDICARE

## 2021-06-03 VITALS
OXYGEN SATURATION: 91 % | DIASTOLIC BLOOD PRESSURE: 89 MMHG | HEART RATE: 98 BPM | SYSTOLIC BLOOD PRESSURE: 177 MMHG | TEMPERATURE: 98.6 F | RESPIRATION RATE: 20 BRPM

## 2021-06-03 PROCEDURE — 77030003676 HC NDL SPN MPRI -A: Performed by: PHYSICAL MEDICINE & REHABILITATION

## 2021-06-03 PROCEDURE — 77030039433 HC TY MYLEOGRAM BD -B: Performed by: PHYSICAL MEDICINE & REHABILITATION

## 2021-06-03 PROCEDURE — 64483 NJX AA&/STRD TFRM EPI L/S 1: CPT | Performed by: PHYSICAL MEDICINE & REHABILITATION

## 2021-06-03 PROCEDURE — 74011250637 HC RX REV CODE- 250/637: Performed by: PHYSICAL MEDICINE & REHABILITATION

## 2021-06-03 PROCEDURE — 74011000250 HC RX REV CODE- 250: Performed by: PHYSICAL MEDICINE & REHABILITATION

## 2021-06-03 PROCEDURE — 74011000636 HC RX REV CODE- 636: Performed by: PHYSICAL MEDICINE & REHABILITATION

## 2021-06-03 PROCEDURE — 64484 NJX AA&/STRD TFRM EPI L/S EA: CPT | Performed by: PHYSICAL MEDICINE & REHABILITATION

## 2021-06-03 PROCEDURE — 74011250636 HC RX REV CODE- 250/636: Performed by: PHYSICAL MEDICINE & REHABILITATION

## 2021-06-03 PROCEDURE — 77030003669 HC NDL SPN COOK -B: Performed by: PHYSICAL MEDICINE & REHABILITATION

## 2021-06-03 PROCEDURE — 2709999900 HC NON-CHARGEABLE SUPPLY: Performed by: PHYSICAL MEDICINE & REHABILITATION

## 2021-06-03 PROCEDURE — 76010000009 HC PAIN MGT 0 TO 30 MIN PROC: Performed by: PHYSICAL MEDICINE & REHABILITATION

## 2021-06-03 RX ORDER — DEXAMETHASONE SODIUM PHOSPHATE 100 MG/10ML
INJECTION INTRAMUSCULAR; INTRAVENOUS AS NEEDED
Status: DISCONTINUED | OUTPATIENT
Start: 2021-06-03 | End: 2021-06-03 | Stop reason: HOSPADM

## 2021-06-03 RX ORDER — LIDOCAINE HYDROCHLORIDE 10 MG/ML
INJECTION, SOLUTION EPIDURAL; INFILTRATION; INTRACAUDAL; PERINEURAL AS NEEDED
Status: DISCONTINUED | OUTPATIENT
Start: 2021-06-03 | End: 2021-06-03 | Stop reason: HOSPADM

## 2021-06-03 RX ORDER — DIAZEPAM 5 MG/1
5-20 TABLET ORAL ONCE
Status: COMPLETED | OUTPATIENT
Start: 2021-06-03 | End: 2021-06-03

## 2021-06-03 RX ADMIN — DIAZEPAM 10 MG: 5 TABLET ORAL at 12:26

## 2021-06-03 NOTE — PROCEDURES
PROCEDURE NOTE      Patient Name: Tim Machado    Date of Procedure: Emma 3, 2021    Preoperative Diagnosis:  Lumbar radiculopathy [M54.16]    Post Operative Diagnosis:  Lumbar radiculopathy [M54.16]    Procedure :    left L5 Selective Nerve Root Block  left S1 Selective Nerve Root Block      Consent:  Informed consent was obtained prior to the procedure. The patient was given the opportunity to ask questions regarding the procedure and its associated risks. In addition to the potential risks associated with the procedure itself, the patient was informed both verbally and in writing of the potential side effects of the use of glucocorticoid. The patient appeared to comprehend the informed consent and desired to have the procedure performed. Procedure: The patient was placed in the prone position on the fluoroscopy table and the back was prepped and draped in the usual sterile manner. The exact spinal level was  identified using fluoroscopy, and Lidocaine 1 % was injected locally, a # 22 gauge spinal needle was passed to the transverse process. The depth was noted and the needle redirected to pass inferior and approximately one cm anterior to the transverse process. YES  1 cc of Isovue M-200 was used to verify positioning in the epidural and paravertebral space and outlined the course of the spinal nerve into the epidural space. The same procedure was repeated at each spinal level indicated above. A total of 10 mg of preservative free Dexamethasone and 2 cc of Lidocaine was slowly injected. The patient tolerated the procedure well. The injection area was cleaned and bandaids applied. Not excessive bleeding was noted. Patient dressed and discharged to home with instructions. Discussion: The patient tolerated the procedure well.                                               Laila Montano MD  Emma 3, 2021

## 2021-06-03 NOTE — DISCHARGE INSTRUCTIONS
[facility]   ([facility])  Dr. Shahram Uribe, Dr. Kennedi Rudolph, Dr. Rosa Brown Spinal Procedure (Block) Instructions    * Do not drive a car, operate heavy machinery or dangerous equipment, or make important decisions for 12-24 hours. * Light activity as tolerated; may rest for the remainder of the day. * Resume pre-block medications including those from your other doctors. * Do not drink alcoholic beverages for 24 hours. Alcohol and the medications you have received may interact and cause an adverse reaction. * You may feel better this evening and worse tomorrow, as the numbing medications wears off and the steroid has yet to begin to work. After 48-72 hrs the steroid should begin to release bringing you relief. If you had a medial branch block, no steroids were used. The medial branch block is a test to see if you are a candidate for radiofrequency ablation (RFA). The anesthetic (numbing medicine)  will wear off by the next day. * You may shower this evening and remove any bandages. * Avoid hot tubs/pools/tub soaks and heating pads for 24 hours. You may use cold packs on the procedure site as tolerated for the first 24 hours. * If a headache develops, drink plenty of fluids and rest.  Take over the counter medications for headache if needed. If the headache continues longer than 24 hours, call MD at the [address] [place] [address] Avenue. [phone]    * Continue taking pain medications as needed. * You may resume your regular diet if tolerated. Otherwise, start with sips of water and advance slowly. * If Diabetic: check your blood sugar three times a day for the next 3 days. If your sugar is greater than 300 call your family doctor. If your sugar is greater than 400, have someone transport you to the nearest Emergency Room. * If you experience any of the following problems, Please Call the [address] [place] [address] Avenue at [phone].         * Excessive pain, swelling, redness or odor at or around the surgical area    * Fever of 101 or higher    * Nausea / Vomiting lasting longer than 4 hours or if unable to take medications. * Severe Headache    * Weakness or numbness in arms or legs that is not      resolving   * Any NEW signs of decreased circulation or nerve impairment in leg: change in color, swelling, persistent numbness, tingling                    * Prolonged increase in pain greater than 4 days      PATIENT INSTRUCTIONS:    After oral sedation, for 12-24 hours or while taking prescription Narcotics:  · Limit your activities  · Do not drive and operate hazardous machinery  · Do not make important personal or business decisions  · Do  not drink alcoholic beverages  · If you have not urinated within 8 hours after discharge, please contact your surgeon on call. *  Please give a list of your current medications to your Primary Care Provider. *  Please update this list whenever your medications are discontinued, doses are      changed, or new medications (including over-the-counter products) are added. *  Please carry medication information at all times in case of emergency situations. These are general instructions for a healthy lifestyle:    No smoking/ No tobacco products/ Avoid exposure to second hand smoke    Surgeon General's Warning:  Quitting smoking now greatly reduces serious risk to your health. Obesity, smoking, and sedentary lifestyle greatly increases your risk for illness    A healthy diet, regular physical exercise & weight monitoring are important for maintaining a healthy lifestyle    You may be retaining fluid if you have a history of heart failure or if you experience any of the following symptoms:  Weight gain of 3 pounds or more overnight or 5 pounds in a week, increased swelling in our hands or feet or shortness of breath while lying flat in bed.   Please call your doctor as soon as you notice any of these symptoms; do not wait until your next office visit. Recognize signs and symptoms of STROKE:    F-face looks uneven    A-arms unable to move or move unevenly    S-speech slurred or non-existent    T-time-call 911 as soon as signs and symptoms begin-DO NOT go       Back to bed or wait to see if you get better-TIME IS BRAIN.

## 2021-06-09 ENCOUNTER — TELEPHONE (OUTPATIENT)
Dept: PULMONOLOGY | Age: 76
End: 2021-06-09

## 2021-06-10 ENCOUNTER — OFFICE VISIT (OUTPATIENT)
Dept: ORTHOPEDIC SURGERY | Age: 76
End: 2021-06-10
Payer: MEDICARE

## 2021-06-10 VITALS
HEIGHT: 65 IN | BODY MASS INDEX: 28.82 KG/M2 | HEART RATE: 92 BPM | RESPIRATION RATE: 15 BRPM | OXYGEN SATURATION: 96 % | WEIGHT: 173 LBS

## 2021-06-10 DIAGNOSIS — S42.031D CLOSED DISPLACED FRACTURE OF ACROMIAL END OF RIGHT CLAVICLE WITH ROUTINE HEALING, SUBSEQUENT ENCOUNTER: Primary | ICD-10-CM

## 2021-06-10 PROCEDURE — 99212 OFFICE O/P EST SF 10 MIN: CPT | Performed by: ORTHOPAEDIC SURGERY

## 2021-06-10 PROCEDURE — 73000 X-RAY EXAM OF COLLAR BONE: CPT | Performed by: ORTHOPAEDIC SURGERY

## 2021-06-10 PROCEDURE — G8427 DOCREV CUR MEDS BY ELIG CLIN: HCPCS | Performed by: ORTHOPAEDIC SURGERY

## 2021-06-10 PROCEDURE — G8536 NO DOC ELDER MAL SCRN: HCPCS | Performed by: ORTHOPAEDIC SURGERY

## 2021-06-10 PROCEDURE — 1101F PT FALLS ASSESS-DOCD LE1/YR: CPT | Performed by: ORTHOPAEDIC SURGERY

## 2021-06-10 PROCEDURE — 1090F PRES/ABSN URINE INCON ASSESS: CPT | Performed by: ORTHOPAEDIC SURGERY

## 2021-06-10 PROCEDURE — G8432 DEP SCR NOT DOC, RNG: HCPCS | Performed by: ORTHOPAEDIC SURGERY

## 2021-06-10 PROCEDURE — G8419 CALC BMI OUT NRM PARAM NOF/U: HCPCS | Performed by: ORTHOPAEDIC SURGERY

## 2021-06-10 NOTE — PROGRESS NOTES
Romy Mario  1945   Chief Complaint   Patient presents with    Shoulder Pain     right shoulder        HISTORY OF PRESENT ILLNESS  Romy Mario is a 66 y.o. female who presents today for reevaluation of right shoulder. Patient rates pain as 0/10 today. Pt fell out of bed on 4/15/2021 and went to the ED. Fell onto the right shoulder. Has tried taking Naproxen. Has minimal pain today. She is doing well today. Patient denies any fever, chills, chest pain, shortness of breath or calf pain. The remainder of the review of systems is negative. There are no new illness or injuries to report since last seen in the office. There are no changes to medications, allergies, family or social history. PHYSICAL EXAM:   Visit Vitals  Pulse 92   Resp 15   Ht 5' 5\" (1.651 m)   Wt 173 lb (78.5 kg)   LMP 01/01/1974   SpO2 96%   BMI 28.79 kg/m²     The patient is a well-developed, well-nourished female   in no acute distress. The patient is alert and oriented times three. The patient is alert and oriented times three. Mood and affect are normal.  LYMPHATIC: lymph nodes are not enlarged and are within normal limits  SKIN: normal in color and non tender to palpation. There are no bruises or abrasions noted. NEUROLOGICAL: Motor sensory exam is within normal limits. Reflexes are equal bilaterally.  There is normal sensation to pinprick and light touch  MUSCULOSKELETAL:  Examination Right shoulder   Skin Intact   AC joint tenderness -   Biceps tenderness -   Forward flexion/Elevation ROM 60   Active abduction ROM 60   Glenohumeral abduction 45   External rotation ROM 0   Internal rotation ROM 30   Apprehension -   Andrews Relocation -   Jerk -   Load and Shift -   Obriens -   Speeds -   Impingement sign -   Supraspinatus/Empty Can -, 5/5   External Rotation Strength -, 5/5   Lift Off/Belly Press -, 5/5   Neurovascular Intact   Marked tenderness over distal clavicle with swelling       IMAGING: XR of right clavicle with 2 views obtained in the office dated 6/10/2021 was reviewed and read by Dr. Oli Ritchie: No change in position at the fracture site        XR of right clavicle with 2 views obtained in the office dated 4/28/2021 was reviewed and read by Dr. Oli Ritchie: No change in position of the fracture site         XR of right shoulder with 3 views from hospitals [facility] dated 4/15/2021 was reviewed and read by Dr. Oli Ritchie:   IMPRESSION:  Displaced and comminuted fracture distal right clavicle. Severe degenerative changes right glenohumeral joint. XR of right shoulder from [place] dated 5/29/2020 was reviewed and read by Dr. Oli Ritchie: Marked degenerative changes in the glenohumeral joint      IMPRESSION:      ICD-10-CM ICD-9-CM    1. Closed displaced fracture of acromial end of right clavicle with routine healing, subsequent encounter  S42.031D V54.11 AMB POC XRAY; CLAVICLE, COMPLETE        PLAN:   1. Pt presents today with right shoulder pain due to a distal clavicle fracture and she notes overall improvement in her symptoms. Pt is doing well today and can return as needed. Risk factors include: htn   2. No ultrasound exam indicated today  3. No cortisone injection indicated today   4. No Physical/Occupational Therapy indicated today  5. No diagnostic test indicated today:   6. No durable medical equipment indicated today   7. No referral indicated today   8. No medications indicated today:  9. No Narcotic indicated today        RTC prn       Scribed by [address]) as dictated by Michaelle Isidro MD    I, Dr. Michaelle Isidro, confirm that all documentation is accurate.     Michaelle Isidro M.D.   [facility]

## 2021-06-17 ENCOUNTER — VIRTUAL VISIT (OUTPATIENT)
Dept: ORTHOPEDIC SURGERY | Age: 76
End: 2021-06-17
Payer: MEDICARE

## 2021-06-17 DIAGNOSIS — G60.9 IDIOPATHIC PERIPHERAL NEUROPATHY: ICD-10-CM

## 2021-06-17 DIAGNOSIS — M51.36 DDD (DEGENERATIVE DISC DISEASE), LUMBAR: ICD-10-CM

## 2021-06-17 DIAGNOSIS — M54.16 LUMBAR NEURITIS: ICD-10-CM

## 2021-06-17 DIAGNOSIS — M43.10 SPONDYLOLISTHESIS, ACQUIRED: ICD-10-CM

## 2021-06-17 DIAGNOSIS — M47.817 LUMBOSACRAL SPONDYLOSIS WITHOUT MYELOPATHY: Primary | ICD-10-CM

## 2021-06-17 DIAGNOSIS — M48.061 SPINAL STENOSIS OF LUMBAR REGION, UNSPECIFIED WHETHER NEUROGENIC CLAUDICATION PRESENT: ICD-10-CM

## 2021-06-17 PROCEDURE — 99442 PR PHYS/QHP TELEPHONE EVALUATION 11-20 MIN: CPT | Performed by: PHYSICAL MEDICINE & REHABILITATION

## 2021-06-17 PROCEDURE — G8432 DEP SCR NOT DOC, RNG: HCPCS | Performed by: PHYSICAL MEDICINE & REHABILITATION

## 2021-06-17 PROCEDURE — 1090F PRES/ABSN URINE INCON ASSESS: CPT | Performed by: PHYSICAL MEDICINE & REHABILITATION

## 2021-06-17 PROCEDURE — G8419 CALC BMI OUT NRM PARAM NOF/U: HCPCS | Performed by: PHYSICAL MEDICINE & REHABILITATION

## 2021-06-17 PROCEDURE — 1101F PT FALLS ASSESS-DOCD LE1/YR: CPT | Performed by: PHYSICAL MEDICINE & REHABILITATION

## 2021-06-17 PROCEDURE — G8427 DOCREV CUR MEDS BY ELIG CLIN: HCPCS | Performed by: PHYSICAL MEDICINE & REHABILITATION

## 2021-06-17 PROCEDURE — G8536 NO DOC ELDER MAL SCRN: HCPCS | Performed by: PHYSICAL MEDICINE & REHABILITATION

## 2021-06-17 NOTE — PROGRESS NOTES
[facility]  [address], [address] [facility], [address]  Phone: [phone]  Fax: [phone]          PROGRESS NOTE    CONSENT:  Pursuant to the emergency declaration under the [address] and [address] authority and the [employer] Act, this Virtual Visit was conducted, with patient's consent, to reduce the patient's risk of exposure to COVID-19 and provide continuity of care for an established patient. Services were unable to be provided through a video synchronous discussion virtually to substitute for in-person appointment. Subsequently, the patient was consulted through a telephone discussion. ENCOUNTER (minutes): 11 minutes 33 seconds    HISTORY OF PRESENT ILLNESS:  The patient is a 66 y.o. female. is being consulted from home by me via telephone at the [facility] office with c/o right sided low back pain that radiates posteriorly down to her calf with intermittent radiation to her foot x a few months. Previously, she was seen for left-sided lower back pain radiating into the LLE in a S1 distribution to the ankle. Previously, she was seen for progressive, intermittent low back pain into the LLE radiating to the ankle x 8/2019 without trauma. Her pain is exacerbated with walking. Positive shopping cart sign. She has completed MDP without benefit. She is currently on Cymbalta 90 mg secondary to depression. Patient failed NEURONTIN 800 mg TID secondary to no relief. Pt underwent left L5 and S1 SNRB on 5/28/2020 with good relief. Pt underwent left-sided L5 and S1 SNRB on 1/21/2021 with relief.  Pt denies change in bowel or bladder habits. Pt denies fever, weight loss, or skin changes. Pt denies any signs of weakness. Patient denies previous spinal surgery or physical therapy/chiropractic care. Pt denies h/o chemotherapy, gastric bypass, alcohol abuse, or DM. PmHx of depression. Note from Dr. Migue Gaffney patient was seen with c/o pain and burning in the left leg. Indicated patient was on Cymbalta 60 mg and Neurontin 300 mg BID at that time. Note from Dr. Sonia Bernabe patient reports no change since last visit. Indicated she endorsed better benefit with the qhs dose of Neurontin. Indicated they increased her Neurontin to 600 mg TID and started her on a MDP at that time. Note from Dr. Lana Smith 7/9/2020 indicating patient was seen with c/o right shoulder pain. Her pain is worse with overhead activity and at night. Indicated she has OA of the right shoulder. L Spine MRI dated 2/10/2020 films reviewed. Per report, grade 1 anterolistheses at L4-5 and L5-S1. Mild spinal canal stenosis at L2-3. Moderate spinal canal stenoses from L3-4 to L5-S1. Mild foraminal stenoses at L3-4 and L4-5. Moderate to severe bilateral foraminal stenoses at L5-S1 with potential L5 nerve root compression. Complex left renal cyst with fluid-hemorrhage level. Other small simple renal cysts.  LLE EMG dated 1/17/2020 suggested: sensorimotor peripheral neuropathy and chronic L5 and S1 radiculopathy. At her last clinical appointment,    At today's telephone consultation, the patient c/o right sided low back pain that radiates posteriorly down to her calf with intermittent radiation to her foot x few months. No injury. She rates her pain 6/10 when pain is flared. Her pain is steady. She underwent a left L5-S1 SNRB on 6/3/2021. She states that her left side is doing well. Her previous left-sided pain was 2/10. She continues to take Lyrica 225 mg and is tolerating it well.  reviewed. There is no height or weight on file to calculate BMI.     PCP: Domi Krueger MD      Past Medical History:   Diagnosis Date    Allergic rhinitis     on allergy shots    Anemia     Asthma     Borderline diabetic     Cataract     Chronic lung disease     Cigarette smoker abuse quit 2006    COPD [facility] obstructive [facility] disease) [facility])     Degenerative arthritis     both shoulders    Depression     Easy bruising     Hypertension     Nervousness     Osteoporosis 10/27/99    Personal history of smoking 9/10/2020    Pharyngoesophageal dysphagia 9/10/2020        Social History     Socioeconomic History    Marital status:      Spouse name: Not on file    Number of children: Not on file    Years of education: Not on file    Highest education level: Not on file   Occupational History    Not on file   Tobacco Use    Smoking status: Former Smoker     Packs/day: 2.50     Years: 45.00     Pack years: 112.50     Types: Cigarettes     Start date: 9/10/1959     Quit date: 1/1/2006     Years since quitting: 15.4    Smokeless tobacco: Never Used    Tobacco comment: smoked for 50 years   Substance and Sexual Activity    Alcohol use: Yes     Alcohol/week: 2.0 - 3.0 standard drinks     Types: 2 - 3 Standard drinks or equivalent per week     Comment: occ    Drug use: No    Sexual activity: Not Currently   Other Topics Concern    Not on file   Social History Narrative    Not on file     Social Determinants of Health     Financial Resource Strain:     Difficulty of Paying Living Expenses:    Food Insecurity:     Worried About Running Out of Food in the Last Year:     Ran Out of Food in the Last Year:    Transportation Needs:     Lack of Transportation (Medical):      Lack of Transportation (Non-Medical):    Physical Activity:     Days of Exercise per Week:     Minutes of Exercise per Session:    Stress:     Feeling of Stress :    Social Connections:     Frequency of Communication with Friends and Family:     Frequency of Social Gatherings with Friends and Family:     Attends [employer] Services:     Active Member of Clubs or Organizations:     Attends Club or Organization Meetings:     Marital Status:    Intimate Partner Violence:     Fear of Current or Ex-Partner:     Emotionally Abused:     Physically Abused:     Sexually Abused:        Current Outpatient Medications   Medication Sig Dispense Refill    pregabalin (LYRICA) 225 mg capsule TAKE ONE CAPSULE BY MOUTH TWICE A DAY. MAX DAILY AMOUNT: 450 MG. 60 Capsule 1    naproxen (Naprosyn) 500 mg tablet 1 tablet by mouth every 8-12 hours as needed for shoulder pain 20 Tab 0    arformoteroL (Brovana) 15 mcg/2 mL nebu neb solution INHALE CONTENTS OF 1 VIAL BY MOUTH TWICE A DAY, IDC code J44.9 file under Medicare Part B 120 Vial 3    buPROPion SR (WELLBUTRIN SR) 100 mg SR tablet       tiotropium bromide (Spiriva Respimat) 2.5 mcg/actuation inhaler Take 2 Puffs by inhalation daily. 2 Inhaler 0    albuterol (Ventolin HFA) 90 mcg/actuation inhaler Take 2 Puffs by inhalation every four (4) hours as needed for Wheezing. 1 Inhaler 2    tiotropium bromide (Spiriva Respimat) 2.5 mcg/actuation inhaler Take 2 Puffs by inhalation daily. 2 Inhaler 0    albuterol sulfate (PROVENTIL;VENTOLIN) 2.5 mg/0.5 mL nebu nebulizer solution 2.5 mg by Nebulization route every four (4) hours as needed for Wheezing or Shortness of Breath. [employer]      meloxicam (MOBIC) 15 mg tablet Take 15 mg by mouth as needed.  DULoxetine (CYMBALTA) 20 mg capsule Take 90 mg by mouth daily.  lisinopril (PRINIVIL, ZESTRIL) 10 mg tablet TAKE 1 TABLET BY MOUTH DAILY 30 Tab 0    sertraline (ZOLOFT) 100 mg tablet TAKE 1 TABLET BY MOUTH EVERY DAY 30 Tab 6    doxycycline (VIBRAMYCIN) 100 mg capsule Take 1 Cap by mouth two (2) times a day. (Patient not taking: Reported on 6/17/2021) 20 Cap 0    pregabalin (Lyrica) 150 mg capsule Take 1 Cap by mouth two (2) times a day. Max Daily Amount: 300 mg. (Patient not taking: Reported on 6/17/2021) 60 Cap 1    pregabalin (LYRICA) 100 mg capsule TAKE ONE CAPSULE BY MOUTH TWICE A DAY.  MAX DAILY AMOUNT: 200MG (Patient not taking: Reported on 6/17/2021) 60 Cap 0    pregabalin (LYRICA) 50 mg capsule Take 1 Cap by mouth two (2) times a day. Max Daily Amount: 100 mg. (Patient not taking: Reported on 6/17/2021) 180 Cap 0    gabapentin (Neurontin) 400 mg capsule Take 1 cap tid x 1 week, then 1 cap bid x 1 week, then 1 cap every day x 1 week and then stop (Patient not taking: Reported on 6/17/2021) 42 Cap 0    pregabalin (LYRICA) 50 mg capsule Take 1 Cap by mouth two (2) times a day. Max Daily Amount: 100 mg. (Patient not taking: Reported on 6/17/2021) 60 Cap 1    pregabalin (LYRICA) 50 mg capsule Take 1 Cap by mouth two (2) times a day. Max Daily Amount: 100 mg. (Patient not taking: Reported on 6/17/2021) 60 Cap 1    pravastatin (PRAVACHOL) 20 mg tablet  (Patient not taking: Reported on 6/17/2021)      gabapentin (NEURONTIN) 100 mg capsule 600 mg. (Patient not taking: Reported on 6/17/2021)      cyclobenzaprine (FLEXERIL) 5 mg tablet Take 5 mg by mouth three (3) times daily as needed for Muscle Spasm(s). (Patient not taking: Reported on 6/3/2021)      diazepam (VALIUM) 5 mg tablet Take 1 Tab by mouth two (2) times daily as needed for Anxiety. (Patient not taking: Reported on 6/17/2021) 60 Tab 2       Allergies   Allergen Reactions    Qvar [Beclomethasone Dipropionate] Shortness of Breath    Entex [Phenylephrine-Guaifenesin] Other (comments)     intolerance    Sulfa (Sulfonamide Antibiotics) Other (comments)          PHYSICAL EXAMINATION  Unable to perform examination secondary to COVID-19. CONSTITUTIONAL: NAD, A&O x 3    ASSESSMENT   Diagnoses and all orders for this visit:    1. Lumbosacral spondylosis without myelopathy    2. Spinal stenosis of lumbar region, unspecified whether neurogenic claudication present    3. Lumbar neuritis    4. DDD (degenerative disc disease), lumbar    5. Idiopathic peripheral neuropathy    6. Spondylolisthesis, acquired        IMPRESSION AND PLAN:  The patient consented to the tele health visit and was aware that there would be a charge.  During today's telephone visit, patient had c/o right sided low back pain that radiates posteriorly down to her calf with intermittent radiation to her foot x few months. Multiple treatment options were discussed. I will increase Lyrica from 225 mg to 300 mg BID. Pt appears to be neurologically intact. I will see the patient back in 1 month's time in office or earlier if needed. Written by Aury Rose, as dictated by Alfreda Villagran MD  I examined the patient, reviewed and agree with the note.

## 2021-06-23 NOTE — PROGRESS NOTES
[facility]  [address], [place]   Phone: [phone]  Fax: [phone]        PROGRESS NOTE      HISTORY OF PRESENT ILLNESS:  The patient is a 66 y.o. female and was seen today for follow up of right sided low back pain that radiates posteriorly down to her calf with intermittent radiation to her foot x few months. Previously, she was seen for right sided low back pain that radiates posteriorly down to her calf with intermittent radiation to her foot x a few months. Previously, she was seen for left-sided lower back pain radiating into the LLE in a S1 distribution to the ankle. Previously, she was seen for progressive, intermittent low back pain into the LLE radiating to the ankle x 8/2019 without trauma. Her pain is exacerbated with walking. Positive shopping cart sign. She has completed MDP without benefit. She is currently on Cymbalta 90 mg secondary to depression. Patient failed NEURONTIN 800 mg TID secondary to no relief. Pt underwent left L5 and S1 SNRB on 5/28/2020 with good relief. Pt underwent left-sided L5 and S1 SNRB on 1/21/2021 with relief. She underwent a left L5-S1 SNRB on 6/3/2021. She states that her left side is doing well. Pt denies change in bowel or bladder habits. Pt denies fever, weight loss, or skin changes. Pt denies any signs of weakness. Patient denies previous spinal surgery or physical therapy/chiropractic care. Pt denies h/o chemotherapy, gastric bypass, alcohol abuse, or DM. PmHx of depression. Note from Dr. Lakhwinder Fernandez patient was seen with c/o pain and burning in the left leg. Indicated patient was on Cymbalta 60 mg and Neurontin 300 mg BID at that time. Note from Dr. Yvonne Rios patient reports no change since last visit. Indicated she endorsed better benefit with the qhs dose of Neurontin. Indicated they increased her Neurontin to 600 mg TID and started her on a MDP at that time. Note from Dr. Radha Keith 7/9/2020 indicating patient was seen with c/o right shoulder pain. Her pain is worse with overhead activity and at night. Indicated she has OA of the right shoulder. L Spine MRI dated 2/10/2020 films reviewed. Per report, grade 1 anterolistheses at L4-5 and L5-S1. Mild spinal canal stenosis at L2-3. Moderate spinal canal stenoses from L3-4 to L5-S1. Mild foraminal stenoses at L3-4 and L4-5. Moderate to severe bilateral foraminal stenoses at L5-S1 with potential L5 nerve root compression. Complex left renal cyst with fluid-hemorrhage level. Other small simple renal cysts.  LLE EMG dated 1/17/2020 suggested: sensorimotor peripheral neuropathy and chronic L5 and S1 radiculopathy. At her last clinical appointment, I increased her Lyrica from 225 mg to 300 mg BID.     The patient returns today with pain and paraesthesias in the BLE (RLE>LLE) to the ankles. She rates her pain 2/10, previously 6/10. Her paraesthesias are worse in the distal aspect of the BLE. She is tolerating the Lyrica 300 mg BID at this time. Pt denies change in bowel or bladder habits.  reviewed. Body mass index is 29.32 kg/m².     PCP: Jose F Kerr MD      Past Medical History:   Diagnosis Date    Allergic rhinitis     on allergy shots    Anemia     Asthma     Borderline diabetic     Cataract     Chronic lung disease     Cigarette smoker     abuse quit 2006    COPD (chronic obstructive pulmonary disease) ([facility] [address]     Degenerative arthritis     both shoulders    Depression     Easy bruising     Hypertension     Nervousness     Osteoporosis 10/27/99    Personal history of smoking 9/10/2020    Pharyngoesophageal dysphagia 9/10/2020        Social History     Socioeconomic History    Marital status:      Spouse name: Not on file    Number of children: Not on file    Years of education: Not on file    Highest education level: Not on file   Occupational History    Not on file   Tobacco Use    Smoking status: Former Smoker     Packs/day: 2.50     Years: 45.00     Pack years: 112.50     Types: Cigarettes     Start date: 9/10/1959     Quit date: 1/1/2006     Years since quitting: 15.4    Smokeless tobacco: Never Used    Tobacco comment: smoked for 50 years   Substance and Sexual Activity    Alcohol use: Yes     Alcohol/week: 2.0 - 3.0 standard drinks     Types: 2 - 3 Standard drinks or equivalent per week     Comment: occ    Drug use: No    Sexual activity: Not Currently   Other Topics Concern    Not on file   Social History Narrative    Not on file     Social Determinants of Health     Financial Resource Strain:     Difficulty of Paying Living Expenses:    Food Insecurity:     Worried About Running Out of Food in the Last Year:     Ran Out of Food in the Last Year:    Transportation Needs:     Lack of Transportation (Medical):  Lack of Transportation (Non-Medical):    Physical Activity:     Days of Exercise per Week:     Minutes of Exercise per Session:    Stress:     Feeling of Stress :    Social Connections:     Frequency of Communication with Friends and Family:     Frequency of Social Gatherings with Friends and Family:     Attends [employer] Services:     Active Member of Clubs or Organizations:     Attends Club or Organization Meetings:     Marital Status:    Intimate Partner Violence:     Fear of Current or Ex-Partner:     Emotionally Abused:     Physically Abused:     Sexually Abused:        Current Outpatient Medications   Medication Sig Dispense Refill    pregabalin (LYRICA) 300 mg capsule Take 1 Capsule by mouth two (2) times a day.  Max Daily Amount: 600 mg. 60 Capsule 1    naproxen (Naprosyn) 500 mg tablet 1 tablet by mouth every 8-12 hours as needed for shoulder pain 20 Tab 0    arformoteroL (Brovana) 15 mcg/2 mL nebu neb solution INHALE CONTENTS OF 1 VIAL BY MOUTH TWICE A DAY, IDC code J44.9 file under Medicare Part B 120 Vial 3    buPROPion SR (WELLBUTRIN SR) 100 mg SR tablet       tiotropium bromide (Spiriva Respimat) 2.5 mcg/actuation inhaler Take 2 Puffs by inhalation daily. 2 Inhaler 0    albuterol (Ventolin HFA) 90 mcg/actuation inhaler Take 2 Puffs by inhalation every four (4) hours as needed for Wheezing. 1 Inhaler 2    tiotropium bromide (Spiriva Respimat) 2.5 mcg/actuation inhaler Take 2 Puffs by inhalation daily. 2 Inhaler 0    albuterol sulfate (PROVENTIL;VENTOLIN) 2.5 mg/0.5 mL nebu nebulizer solution 2.5 mg by Nebulization route every four (4) hours as needed for Wheezing or Shortness of Breath. [employer]      meloxicam (MOBIC) 15 mg tablet Take 15 mg by mouth as needed.  DULoxetine (CYMBALTA) 20 mg capsule Take 90 mg by mouth daily.  lisinopril (PRINIVIL, ZESTRIL) 10 mg tablet TAKE 1 TABLET BY MOUTH DAILY 30 Tab 0    sertraline (ZOLOFT) 100 mg tablet TAKE 1 TABLET BY MOUTH EVERY DAY 30 Tab 6    pregabalin (LYRICA) 225 mg capsule TAKE ONE CAPSULE BY MOUTH TWICE A DAY. MAX DAILY AMOUNT: 450 MG. (Patient not taking: Reported on 6/24/2021) 60 Capsule 1    doxycycline (VIBRAMYCIN) 100 mg capsule Take 1 Cap by mouth two (2) times a day. (Patient not taking: Reported on 6/17/2021) 20 Cap 0    pregabalin (Lyrica) 150 mg capsule Take 1 Cap by mouth two (2) times a day. Max Daily Amount: 300 mg. (Patient not taking: Reported on 6/17/2021) 60 Cap 1    pregabalin (LYRICA) 100 mg capsule TAKE ONE CAPSULE BY MOUTH TWICE A DAY. MAX DAILY AMOUNT: 200MG (Patient not taking: Reported on 6/17/2021) 60 Cap 0    pregabalin (LYRICA) 50 mg capsule Take 1 Cap by mouth two (2) times a day. Max Daily Amount: 100 mg. (Patient not taking: Reported on 6/17/2021) 180 Cap 0    gabapentin (Neurontin) 400 mg capsule Take 1 cap tid x 1 week, then 1 cap bid x 1 week, then 1 cap every day x 1 week and then stop (Patient not taking: Reported on 6/17/2021) 42 Cap 0    pregabalin (LYRICA) 50 mg capsule Take 1 Cap by mouth two (2) times a day. Max Daily Amount: 100 mg. (Patient not taking: Reported on 6/17/2021) 60 Cap 1    pregabalin (LYRICA) 50 mg capsule Take 1 Cap by mouth two (2) times a day. Max Daily Amount: 100 mg. (Patient not taking: Reported on 6/17/2021) 60 Cap 1    pravastatin (PRAVACHOL) 20 mg tablet  (Patient not taking: Reported on 6/17/2021)      gabapentin (NEURONTIN) 100 mg capsule 600 mg. (Patient not taking: Reported on 6/17/2021)      cyclobenzaprine (FLEXERIL) 5 mg tablet Take 5 mg by mouth three (3) times daily as needed for Muscle Spasm(s). (Patient not taking: Reported on 6/3/2021)      diazepam (VALIUM) 5 mg tablet Take 1 Tab by mouth two (2) times daily as needed for Anxiety. (Patient not taking: Reported on 6/17/2021) 60 Tab 2       Allergies   Allergen Reactions    Qvar [Beclomethasone Dipropionate] Shortness of Breath    Entex [Phenylephrine-Guaifenesin] Other (comments)     intolerance    Sulfa (Sulfonamide Antibiotics) Other (comments)          PHYSICAL EXAMINATION    Visit Vitals  /81 (BP 1 Location: Left arm, BP Patient Position: Sitting)   Pulse 95   Temp 98.1 °F (36.7 °C) (Temporal)   Ht 5' 5\" (1.651 m)   Wt 176 lb 3.2 oz (79.9 kg)   LMP 01/01/1974   SpO2 91%   BMI 29.32 kg/m²       CONSTITUTIONAL: NAD, A&O x 3  SENSATION: Decreased sensation to light touch on the RLE in a S1 distribution. Otherwise, intact to light touch throughout  RANGE OF MOTION: The patient has full passive range of motion in all four extremities. MOTOR:  Straight Leg Raise: Negative, bilateral                 Hip Flex Knee Ext Knee Flex Ankle DF GTE Ankle PF Tone   Right +4/5 +4/5 +4/5 +4/5 +4/5 +4/5 +4/5   Left +4/5 +4/5 +4/5 +4/5 +4/5 +4/5 +4/5       ASSESSMENT   Diagnoses and all orders for this visit:    1. Lumbosacral spondylosis without myelopathy    2. Spinal stenosis of lumbar region, unspecified whether neurogenic claudication present    3. Lumbar neuritis    4. DDD (degenerative disc disease), lumbar    5. Idiopathic peripheral neuropathy    6. Spondylolisthesis, acquired        IMPRESSION AND PLAN:  Patient returns to the office today with c/o pain and paraesthesias in the BLE (RLE>LLE) to the ankles. Multiple treatment options were discussed. Pt's BLE paraesthesias are worse in the distal aspect of the BLE. My sense is her symptoms are primarily related to neuropathy. I will refer her back to Dr. Brandon Pal for a BLE EMG. She should continue on the Lyrica 300 mg BID as it was too early to assess the full benefits of this dose or increase the dose at this time. Patient is neurologically intact. I will see the patient back following the EMG or earlier if needed. Written by Abby Jasso, as dictated by Doug Guzman MD  I examined the patient, reviewed and agree with the note.

## 2021-06-24 ENCOUNTER — OFFICE VISIT (OUTPATIENT)
Dept: ORTHOPEDIC SURGERY | Age: 76
End: 2021-06-24
Payer: MEDICARE

## 2021-06-24 VITALS
TEMPERATURE: 98.1 F | HEART RATE: 95 BPM | SYSTOLIC BLOOD PRESSURE: 136 MMHG | DIASTOLIC BLOOD PRESSURE: 81 MMHG | WEIGHT: 176.2 LBS | OXYGEN SATURATION: 91 % | BODY MASS INDEX: 29.36 KG/M2 | HEIGHT: 65 IN

## 2021-06-24 DIAGNOSIS — M47.817 LUMBOSACRAL SPONDYLOSIS WITHOUT MYELOPATHY: Primary | ICD-10-CM

## 2021-06-24 DIAGNOSIS — M54.16 LUMBAR NEURITIS: ICD-10-CM

## 2021-06-24 DIAGNOSIS — M43.10 SPONDYLOLISTHESIS, ACQUIRED: ICD-10-CM

## 2021-06-24 DIAGNOSIS — G60.9 IDIOPATHIC PERIPHERAL NEUROPATHY: ICD-10-CM

## 2021-06-24 DIAGNOSIS — M51.36 DDD (DEGENERATIVE DISC DISEASE), LUMBAR: ICD-10-CM

## 2021-06-24 DIAGNOSIS — M48.061 SPINAL STENOSIS OF LUMBAR REGION, UNSPECIFIED WHETHER NEUROGENIC CLAUDICATION PRESENT: ICD-10-CM

## 2021-06-24 PROCEDURE — G8419 CALC BMI OUT NRM PARAM NOF/U: HCPCS | Performed by: PHYSICAL MEDICINE & REHABILITATION

## 2021-06-24 PROCEDURE — 99213 OFFICE O/P EST LOW 20 MIN: CPT | Performed by: PHYSICAL MEDICINE & REHABILITATION

## 2021-06-24 PROCEDURE — 1090F PRES/ABSN URINE INCON ASSESS: CPT | Performed by: PHYSICAL MEDICINE & REHABILITATION

## 2021-06-24 PROCEDURE — G8427 DOCREV CUR MEDS BY ELIG CLIN: HCPCS | Performed by: PHYSICAL MEDICINE & REHABILITATION

## 2021-06-24 PROCEDURE — 1101F PT FALLS ASSESS-DOCD LE1/YR: CPT | Performed by: PHYSICAL MEDICINE & REHABILITATION

## 2021-06-24 PROCEDURE — G8432 DEP SCR NOT DOC, RNG: HCPCS | Performed by: PHYSICAL MEDICINE & REHABILITATION

## 2021-06-24 PROCEDURE — G8536 NO DOC ELDER MAL SCRN: HCPCS | Performed by: PHYSICAL MEDICINE & REHABILITATION

## 2021-06-24 NOTE — LETTER
6/24/2021    Patient: Jeana Sapp   YOB: 1945   Date of Visit: 6/24/2021     India Mayes MD  [address] [place] [phone]  Via Fax: [phone]    Dear India Mayes MD,      Thank you for referring Ms. Marilyn Andres to [facility] for evaluation. My notes for this consultation are attached. If you have questions, please do not hesitate to call me. I look forward to following your patient along with you.       Sincerely,    Ronda Laws MD

## 2021-06-28 ENCOUNTER — TRANSCRIBE ORDER (OUTPATIENT)
Dept: SLEEP MEDICINE | Age: 76
End: 2021-06-28

## 2021-06-28 DIAGNOSIS — G47.33 OSA (OBSTRUCTIVE SLEEP APNEA): Primary | ICD-10-CM

## 2021-06-28 DIAGNOSIS — G47.33 OBSTRUCTIVE SLEEP APNEA (ADULT) (PEDIATRIC): ICD-10-CM

## 2021-06-28 DIAGNOSIS — R40.0 SOMNOLENCE: ICD-10-CM

## 2021-07-06 ENCOUNTER — TELEPHONE (OUTPATIENT)
Dept: ORTHOPEDIC SURGERY | Age: 76
End: 2021-07-06

## 2021-07-06 NOTE — TELEPHONE ENCOUNTER
Called patients daughter Dior Navarro and let her know that this message has been sent to [place].

## 2021-07-06 NOTE — TELEPHONE ENCOUNTER
[address], daughter, called to inform Dr. Leach Masters office has not yet received our order and notes for referral.  Please resend.

## 2021-07-07 NOTE — TELEPHONE ENCOUNTER
EMG BLE is scheduled with Dr. Brianna Londono, [address] [place] [address], [place] [phone], on 07/26/21, arrive 10:45AM, test 11:00AM. Patient is aware.

## 2021-07-24 DIAGNOSIS — G60.9 IDIOPATHIC PERIPHERAL NEUROPATHY: ICD-10-CM

## 2021-07-24 DIAGNOSIS — M48.061 SPINAL STENOSIS OF LUMBAR REGION, UNSPECIFIED WHETHER NEUROGENIC CLAUDICATION PRESENT: ICD-10-CM

## 2021-07-24 DIAGNOSIS — M43.10 SPONDYLOLISTHESIS, ACQUIRED: ICD-10-CM

## 2021-07-24 DIAGNOSIS — M51.36 DDD (DEGENERATIVE DISC DISEASE), LUMBAR: ICD-10-CM

## 2021-07-24 DIAGNOSIS — M47.817 LUMBOSACRAL SPONDYLOSIS WITHOUT MYELOPATHY: ICD-10-CM

## 2021-07-24 DIAGNOSIS — M54.16 LUMBAR NEURITIS: ICD-10-CM

## 2021-08-12 ENCOUNTER — TRANSCRIBE ORDER (OUTPATIENT)
Dept: SCHEDULING | Age: 76
End: 2021-08-12

## 2021-08-12 DIAGNOSIS — R91.8 LUNG MASS: Primary | ICD-10-CM

## 2021-08-13 RX ORDER — ARFORMOTEROL TARTRATE 15 UG/2ML
SOLUTION RESPIRATORY (INHALATION)
Qty: 60 VIAL | Refills: 3 | Status: SHIPPED | OUTPATIENT
Start: 2021-08-13

## 2021-08-30 ENCOUNTER — TELEPHONE (OUTPATIENT)
Dept: PULMONOLOGY | Age: 76
End: 2021-08-30

## 2021-08-31 RX ORDER — UMECLIDINIUM 62.5 UG/1
1 AEROSOL, POWDER ORAL DAILY
Qty: 1 INHALER | Refills: 3 | Status: SHIPPED | OUTPATIENT
Start: 2021-08-31

## 2021-08-31 NOTE — PROGRESS NOTES
[facility]  [address], [place]   Phone: [phone]  Fax: [phone]        PROGRESS NOTE      HISTORY OF PRESENT ILLNESS:  The patient is a 66 y.o. female and was seen today for follow up of pain and paraesthesias in the BLE (RLE>LLE) to the ankles. Her paraesthesias is worse in the distal aspect of the BLE. Previously seen for right sided low back pain that radiates posteriorly down to her calf with intermittent radiation to her foot x few months. Previously, she was seen for right sided low back pain that radiates posteriorly down to her calf with intermittent radiation to her foot x a few months. Previously, she was seen for left-sided lower back pain radiating into the LLE in a S1 distribution to the ankle. Previously, she was seen for progressive, intermittent low back pain into the LLE radiating to the ankle x 8/2019 without trauma. Her pain is exacerbated with walking. Positive shopping cart sign. She has completed MDP without benefit. She is currently on Cymbalta 90 mg secondary to depression. Patient failed NEURONTIN 800 mg TID secondary to no relief. Pt underwent left L5 and S1 SNRB on 5/28/2020 with good relief. Pt underwent left-sided L5 and S1 SNRB on 1/21/2021 with relief. She underwent a left L5-S1 SNRB on 6/3/2021. She states that her left side is doing well. Pt denies change in bowel or bladder habits. Pt denies fever, weight loss, or skin changes. Pt denies any signs of weakness. Patient denies previous spinal surgery or physical therapy/chiropractic care. Pt denies h/o chemotherapy, gastric bypass, alcohol abuse, or DM. PmHx of depression. Note from Dr. Rony Lancaster patient was seen with c/o pain and burning in the left leg. Indicated patient was on Cymbalta 60 mg and Neurontin 300 mg BID at that time. Note from Dr. Vandana Liu patient reports no change since last visit.  Indicated she endorsed better benefit with the qhs dose of Neurontin. Indicated they increased her Neurontin to 600 mg TID and started her on a MDP at that time. Note from Dr. Andi Davidson 7/9/2020 indicating patient was seen with c/o right shoulder pain. Her pain is worse with overhead activity and at night. Indicated she has OA of the right shoulder. L Spine MRI dated 2/10/2020 films reviewed. Per report, grade 1 anterolistheses at L4-5 and L5-S1. Mild spinal canal stenosis at L2-3. Moderate spinal canal stenoses from L3-4 to L5-S1. Mild foraminal stenoses at L3-4 and L4-5. Moderate to severe bilateral foraminal stenoses at L5-S1 with potential L5 nerve root compression. Complex left renal cyst with fluid-hemorrhage level. Other small simple renal cysts.  LLE EMG dated 1/17/2020 suggested: sensorimotor peripheral neuropathy and chronic L5 and S1 radiculopathy. At her last clinical appointment, my sense is her symptoms are primarily related to neuropathy. I referred her back to Dr. Keisha Lamar for a BLE EMG. She should continue on the Lyrica 300 mg BID as it was too early to assess the full benefits of this dose.       The patient returns today and reports pain location and distribution remains unchanged. She rates her pain 0-9/10, previously 2/10. Despite her pain score she reports an improvement since last visit. Pt continues on Cymbalta 20 mg daily and Lyrica 300 mg BID. Patient denies history of glaucoma. A BLE EMG dated 7/26/2021 by Dr. Keisha Lamar was suggestive of Sensorimotor peripheral neuropathy, which appears to be unchanged from her last EMG of 1/2021. Chronic L5 and S1 radiculopathy bilaterally.  reviewed. Body mass index is 28.46 kg/m².     PCP: Juana Avila MD      Past Medical History:   Diagnosis Date    Allergic rhinitis     on allergy shots    Anemia     Asthma     Borderline diabetic     Cataract     Chronic lung disease     Cigarette smoker     abuse quit 2006    COPD (chronic obstructive pulmonary disease) ([facility]     Degenerative arthritis     both shoulders    Depression     Easy bruising     Hypertension     Nervousness     Osteoporosis 10/27/99    Personal history of smoking 9/10/2020    Pharyngoesophageal dysphagia 9/10/2020        Social History     Socioeconomic History    Marital status:      Spouse name: Not on file    Number of children: Not on file    Years of education: Not on file    Highest education level: Not on file   Occupational History    Not on file   Tobacco Use    Smoking status: Former Smoker     Packs/day: 2.50     Years: 45.00     Pack years: 112.50     Types: Cigarettes     Start date: 9/10/1959     Quit date: 1/1/2006     Years since quitting: 15.6    Smokeless tobacco: Never Used    Tobacco comment: smoked for 50 years   Substance and Sexual Activity    Alcohol use: Yes     Alcohol/week: 2.0 - 3.0 standard drinks     Types: 2 - 3 Standard drinks or equivalent per week     Comment: occ    Drug use: No    Sexual activity: Not Currently   Other Topics Concern    Not on file   Social History Narrative    Not on file     Social Determinants of Health     Financial Resource Strain:     Difficulty of Paying Living Expenses:    Food Insecurity:     Worried About Running Out of Food in the Last Year:     Ran Out of Food in the Last Year:    Transportation Needs:     Lack of Transportation (Medical):      Lack of Transportation (Non-Medical):    Physical Activity:     Days of Exercise per Week:     Minutes of Exercise per Session:    Stress:     Feeling of Stress :    Social Connections:     Frequency of Communication with Friends and Family:     Frequency of Social Gatherings with Friends and Family:     Attends Yarsani Services:     Active Member of Clubs or Organizations:     Attends Club or Organization Meetings:     Marital Status:    Intimate Partner Violence:     Fear of Current or Ex-Partner:     Emotionally Abused:     Physically Abused:  Sexually Abused:        Current Outpatient Medications   Medication Sig Dispense Refill    atorvastatin (LIPITOR) 20 mg tablet       umeclidinium (Incruse Ellipta) 62.5 mcg/actuation inhaler Take 1 Puff by inhalation daily. 1 Inhaler 3    pregabalin (LYRICA) 300 mg capsule TAKE ONE CAPSULE BY MOUTH TWICE A DAY. MAX TWO CAPSULES IN 24 HOURS. 60 Capsule 2    arformoteroL (Brovana) 15 mcg/2 mL nebu neb solution INHALE TWO MILLILITERS (ONE VIAL) BY MOUTH TWICE A DAY 60 Vial 3    buPROPion SR (WELLBUTRIN SR) 100 mg SR tablet       albuterol (Ventolin HFA) 90 mcg/actuation inhaler Take 2 Puffs by inhalation every four (4) hours as needed for Wheezing. 1 Inhaler 2    albuterol sulfate (PROVENTIL;VENTOLIN) 2.5 mg/0.5 mL nebu nebulizer solution 2.5 mg by Nebulization route every four (4) hours as needed for Wheezing or Shortness of Breath. [employer]      meloxicam (MOBIC) 15 mg tablet Take 15 mg by mouth as needed.  DULoxetine (CYMBALTA) 20 mg capsule Take 90 mg by mouth daily.  lisinopril (PRINIVIL, ZESTRIL) 10 mg tablet TAKE 1 TABLET BY MOUTH DAILY 30 Tab 0    sertraline (ZOLOFT) 100 mg tablet TAKE 1 TABLET BY MOUTH EVERY DAY 30 Tab 6    pregabalin (LYRICA) 225 mg capsule TAKE ONE CAPSULE BY MOUTH TWICE A DAY. MAX DAILY AMOUNT: 450 MG. (Patient not taking: Reported on 6/24/2021) 60 Capsule 1    naproxen (Naprosyn) 500 mg tablet 1 tablet by mouth every 8-12 hours as needed for shoulder pain (Patient not taking: Reported on 9/1/2021) 20 Tab 0    doxycycline (VIBRAMYCIN) 100 mg capsule Take 1 Cap by mouth two (2) times a day. (Patient not taking: Reported on 6/17/2021) 20 Cap 0    pregabalin (Lyrica) 150 mg capsule Take 1 Cap by mouth two (2) times a day. Max Daily Amount: 300 mg. (Patient not taking: Reported on 6/17/2021) 60 Cap 1    pregabalin (LYRICA) 100 mg capsule TAKE ONE CAPSULE BY MOUTH TWICE A DAY.  MAX DAILY AMOUNT: 200MG (Patient not taking: Reported on 6/17/2021) 60 Cap 0    pregabalin (LYRICA) 50 mg capsule Take 1 Cap by mouth two (2) times a day. Max Daily Amount: 100 mg. (Patient not taking: Reported on 6/17/2021) 180 Cap 0    gabapentin (Neurontin) 400 mg capsule Take 1 cap tid x 1 week, then 1 cap bid x 1 week, then 1 cap every day x 1 week and then stop (Patient not taking: Reported on 6/17/2021) 42 Cap 0    pregabalin (LYRICA) 50 mg capsule Take 1 Cap by mouth two (2) times a day. Max Daily Amount: 100 mg. (Patient not taking: Reported on 6/17/2021) 60 Cap 1    pregabalin (LYRICA) 50 mg capsule Take 1 Cap by mouth two (2) times a day. Max Daily Amount: 100 mg. (Patient not taking: Reported on 6/17/2021) 60 Cap 1    pravastatin (PRAVACHOL) 20 mg tablet  (Patient not taking: Reported on 6/17/2021)      gabapentin (NEURONTIN) 100 mg capsule 600 mg. (Patient not taking: Reported on 6/17/2021)      cyclobenzaprine (FLEXERIL) 5 mg tablet Take 5 mg by mouth three (3) times daily as needed for Muscle Spasm(s). (Patient not taking: Reported on 6/3/2021)      diazepam (VALIUM) 5 mg tablet Take 1 Tab by mouth two (2) times daily as needed for Anxiety. (Patient not taking: Reported on 6/17/2021) 60 Tab 2       Allergies   Allergen Reactions    Qvar [Beclomethasone Dipropionate] Shortness of Breath    Entex [Phenylephrine-Guaifenesin] Other (comments)     intolerance    Sulfa (Sulfonamide Antibiotics) Other (comments)          PHYSICAL EXAMINATION    Visit Vitals  BP (!) 142/68 (BP 1 Location: Left arm, BP Patient Position: Sitting)   Pulse 91   Temp 97.8 °F (36.6 °C) (Temporal)   Resp 18   Ht 5' 5\" (1.651 m)   Wt 171 lb (77.6 kg)   LMP 01/01/1974   SpO2 92%   BMI 28.46 kg/m²       CONSTITUTIONAL: NAD, A&O x 3  SENSATION: Intact to light touch throughout  RANGE OF MOTION: The patient has full passive range of motion in all four extremities.   MOTOR:  Straight Leg Raise: Negative, bilateral    Ambulates with a single point cane               Hip Flex Knee Ext Knee Flex Ankle DF GTE Ankle PF Tone Right +4/5 +4/5 +4/5 +4/5 +4/5 +4/5 +4/5   Left +4/5 +4/5 +4/5 +4/5 +4/5 +4/5 +4/5       ASSESSMENT   Diagnoses and all orders for this visit:    1. Lumbosacral spondylosis without myelopathy  -     SCHEDULE SURGERY    2. Spinal stenosis of lumbar region, unspecified whether neurogenic claudication present  -     SCHEDULE SURGERY    3. Lumbar neuritis  -     pregabalin (LYRICA) 300 mg capsule; Take 1 Capsule by mouth two (2) times a day. Max Daily Amount: 600 mg.  -     SCHEDULE SURGERY    4. DDD (degenerative disc disease), lumbar  -     SCHEDULE SURGERY    5. Idiopathic peripheral neuropathy  -     SCHEDULE SURGERY    6. Spondylolisthesis, acquired  -     SCHEDULE SURGERY      IMPRESSION AND PLAN:  Patient returns to the office today with c/o pain and paraesthesias in the BLE (RLE>LLE) to the ankles. Multiple treatment options were discussed. I discussed with the patient about a neurophathy workup with Dr. Hyun Landin , pt declined at this time. I offered to order a bilateral L5-S1 SNRB, pt wishes to proceed. Patient wished to continue her current treatment. I will provide her with refills of Lyrica 300 mg BID. Patient is neurologically intact. I will see the patient back following block or earlier if needed. Written by Dayday Strong, as dictated by Amanda Topete MD  I examined the patient, reviewed and agree with the note.

## 2021-09-01 ENCOUNTER — OFFICE VISIT (OUTPATIENT)
Dept: ORTHOPEDIC SURGERY | Age: 76
End: 2021-09-01
Payer: MEDICARE

## 2021-09-01 VITALS
TEMPERATURE: 97.8 F | HEART RATE: 91 BPM | OXYGEN SATURATION: 92 % | RESPIRATION RATE: 18 BRPM | SYSTOLIC BLOOD PRESSURE: 142 MMHG | WEIGHT: 171 LBS | BODY MASS INDEX: 28.49 KG/M2 | HEIGHT: 65 IN | DIASTOLIC BLOOD PRESSURE: 68 MMHG

## 2021-09-01 DIAGNOSIS — M54.16 LUMBAR NEURITIS: ICD-10-CM

## 2021-09-01 DIAGNOSIS — M43.10 SPONDYLOLISTHESIS, ACQUIRED: ICD-10-CM

## 2021-09-01 DIAGNOSIS — M48.061 SPINAL STENOSIS OF LUMBAR REGION, UNSPECIFIED WHETHER NEUROGENIC CLAUDICATION PRESENT: ICD-10-CM

## 2021-09-01 DIAGNOSIS — M47.817 LUMBOSACRAL SPONDYLOSIS WITHOUT MYELOPATHY: Primary | ICD-10-CM

## 2021-09-01 DIAGNOSIS — G60.9 IDIOPATHIC PERIPHERAL NEUROPATHY: ICD-10-CM

## 2021-09-01 DIAGNOSIS — M51.36 DDD (DEGENERATIVE DISC DISEASE), LUMBAR: ICD-10-CM

## 2021-09-01 PROCEDURE — 1101F PT FALLS ASSESS-DOCD LE1/YR: CPT | Performed by: PHYSICAL MEDICINE & REHABILITATION

## 2021-09-01 PROCEDURE — G8419 CALC BMI OUT NRM PARAM NOF/U: HCPCS | Performed by: PHYSICAL MEDICINE & REHABILITATION

## 2021-09-01 PROCEDURE — G8536 NO DOC ELDER MAL SCRN: HCPCS | Performed by: PHYSICAL MEDICINE & REHABILITATION

## 2021-09-01 PROCEDURE — G8427 DOCREV CUR MEDS BY ELIG CLIN: HCPCS | Performed by: PHYSICAL MEDICINE & REHABILITATION

## 2021-09-01 PROCEDURE — 1090F PRES/ABSN URINE INCON ASSESS: CPT | Performed by: PHYSICAL MEDICINE & REHABILITATION

## 2021-09-01 PROCEDURE — 99214 OFFICE O/P EST MOD 30 MIN: CPT | Performed by: PHYSICAL MEDICINE & REHABILITATION

## 2021-09-01 PROCEDURE — G8432 DEP SCR NOT DOC, RNG: HCPCS | Performed by: PHYSICAL MEDICINE & REHABILITATION

## 2021-09-01 RX ORDER — PREGABALIN 300 MG/1
300 CAPSULE ORAL 2 TIMES DAILY
Qty: 180 CAPSULE | Refills: 0 | Status: SHIPPED
Start: 2021-09-01 | End: 2022-05-27

## 2021-09-01 RX ORDER — ATORVASTATIN CALCIUM 20 MG/1
TABLET, FILM COATED ORAL
COMMUNITY
Start: 2021-06-22

## 2021-09-01 RX ORDER — PREGABALIN 300 MG/1
CAPSULE ORAL
Qty: 60 CAPSULE | Refills: 2 | Status: CANCELLED | OUTPATIENT
Start: 2021-09-01

## 2021-09-01 NOTE — LETTER
9/1/2021    Patient: Humaira Franco   YOB: 1945   Date of Visit: 9/1/2021     Najma Ferreira MD  [address] [place] [phone]  Via Fax: [phone]    Dear Najma Ferreira MD,      Thank you for referring Ms. Zacarias Siddiqui to [facility] for evaluation. My notes for this consultation are attached. If you have questions, please do not hesitate to call me. I look forward to following your patient along with you.       Sincerely,    Ranjana Goodman MD

## 2021-09-02 ENCOUNTER — TELEPHONE (OUTPATIENT)
Dept: ORTHOPEDIC SURGERY | Age: 76
End: 2021-09-02

## 2021-09-02 NOTE — TELEPHONE ENCOUNTER
PA is required for Pregabalin 300mg    Cover My Meds KeyTherese French [place]: [id number] - PA Case ID: [id number] - Rx #: [id number]

## 2021-09-09 ENCOUNTER — APPOINTMENT (OUTPATIENT)
Dept: GENERAL RADIOLOGY | Age: 76
End: 2021-09-09
Attending: PHYSICAL MEDICINE & REHABILITATION
Payer: MEDICARE

## 2021-09-09 ENCOUNTER — HOSPITAL ENCOUNTER (OUTPATIENT)
Age: 76
Setting detail: OUTPATIENT SURGERY
Discharge: HOME | End: 2021-09-09
Attending: PHYSICAL MEDICINE & REHABILITATION | Admitting: PHYSICAL MEDICINE & REHABILITATION
Payer: MEDICARE

## 2021-09-09 VITALS
HEART RATE: 69 BPM | TEMPERATURE: 98.6 F | SYSTOLIC BLOOD PRESSURE: 156 MMHG | RESPIRATION RATE: 16 BRPM | OXYGEN SATURATION: 97 % | DIASTOLIC BLOOD PRESSURE: 91 MMHG

## 2021-09-09 LAB — GLUCOSE BLD STRIP.AUTO-MCNC: 134 MG/DL (ref 70–110)

## 2021-09-09 PROCEDURE — 64483 NJX AA&/STRD TFRM EPI L/S 1: CPT | Performed by: PHYSICAL MEDICINE & REHABILITATION

## 2021-09-09 PROCEDURE — 2709999900 HC NON-CHARGEABLE SUPPLY: Performed by: PHYSICAL MEDICINE & REHABILITATION

## 2021-09-09 PROCEDURE — 76010000009 HC PAIN MGT 0 TO 30 MIN PROC: Performed by: PHYSICAL MEDICINE & REHABILITATION

## 2021-09-09 PROCEDURE — 64484 NJX AA&/STRD TFRM EPI L/S EA: CPT | Performed by: PHYSICAL MEDICINE & REHABILITATION

## 2021-09-09 PROCEDURE — 77030003676 HC NDL SPN MPRI -A: Performed by: PHYSICAL MEDICINE & REHABILITATION

## 2021-09-09 PROCEDURE — 74011000250 HC RX REV CODE- 250: Performed by: PHYSICAL MEDICINE & REHABILITATION

## 2021-09-09 PROCEDURE — 82962 GLUCOSE BLOOD TEST: CPT

## 2021-09-09 PROCEDURE — 74011250637 HC RX REV CODE- 250/637: Performed by: PHYSICAL MEDICINE & REHABILITATION

## 2021-09-09 PROCEDURE — 77030003669 HC NDL SPN COOK -B: Performed by: PHYSICAL MEDICINE & REHABILITATION

## 2021-09-09 PROCEDURE — 74011000636 HC RX REV CODE- 636: Performed by: PHYSICAL MEDICINE & REHABILITATION

## 2021-09-09 PROCEDURE — 74011250636 HC RX REV CODE- 250/636: Performed by: PHYSICAL MEDICINE & REHABILITATION

## 2021-09-09 PROCEDURE — 77030039433 HC TY MYLEOGRAM BD -B: Performed by: PHYSICAL MEDICINE & REHABILITATION

## 2021-09-09 RX ORDER — DIAZEPAM 5 MG/1
5-20 TABLET ORAL ONCE
Status: COMPLETED | OUTPATIENT
Start: 2021-09-09 | End: 2021-09-09

## 2021-09-09 RX ORDER — DEXAMETHASONE SODIUM PHOSPHATE 100 MG/10ML
INJECTION INTRAMUSCULAR; INTRAVENOUS AS NEEDED
Status: DISCONTINUED | OUTPATIENT
Start: 2021-09-09 | End: 2021-09-09 | Stop reason: HOSPADM

## 2021-09-09 RX ORDER — LIDOCAINE HYDROCHLORIDE 10 MG/ML
INJECTION, SOLUTION EPIDURAL; INFILTRATION; INTRACAUDAL; PERINEURAL AS NEEDED
Status: DISCONTINUED | OUTPATIENT
Start: 2021-09-09 | End: 2021-09-09 | Stop reason: HOSPADM

## 2021-09-09 RX ADMIN — DIAZEPAM 10 MG: 5 TABLET ORAL at 13:49

## 2021-09-09 NOTE — PROCEDURES
PROCEDURE NOTE      Patient Name: Kallie Edouard    Date of Procedure: September 9, 2021    Preoperative Diagnosis:  Spinal stenosis of lumbar region without neurogenic claudication [M48.061]    Post Operative Diagnosis:  Spinal stenosis of lumbar region without neurogenic claudication [M48.061]    Procedure :    bilateral  L5 Selective Nerve Root Block  bilateral  S1 Selective Nerve Root Block    Consent:  Informed consent was obtained prior to the procedure. The patient was given the opportunity to ask questions regarding the procedure and its associated risks. In addition to the potential risks associated with the procedure itself, the patient was informed both verbally and in writing of the potential side effects of the use of glucocorticoid. The patient appeared to comprehend the informed consent and desired to have the procedure performed. Procedure: The patient was placed in the prone position on the fluoroscopy table and the back was prepped and draped in the usual sterile manner. The exact spinal level was  identified using fluoroscopy, and Lidocaine 1 % was injected locally, a # 22 gauge spinal needle was passed to the transverse process. The depth was noted and the needle redirected to pass inferior and approximately one cm anterior to the transverse process. YES  1 cc of Isovue M-200 was used to verify positioning in the epidural and paravertebral space and outlined the course of the spinal nerve into the epidural space. The same procedure was repeated at each spinal level indicated above. A total of 10 mg of preservative free Dexamethasone and 4 cc of Lidocaine was slowly injected. The patient tolerated the procedure well. The injection area was cleaned and bandaids applied. Not excessive bleeding was noted. Patient dressed and discharged to home with instructions. Discussion: The patient tolerated the procedure well.                                               Reji TOUSSAINT Harry Isaacs MD  September 9, 2021

## 2021-09-09 NOTE — DISCHARGE INSTRUCTIONS
[facility]   ([facility])  Dr. Jennifer Pantoja, Dr. Diane Mccain, Dr. Kristine Philip Spinal Procedure (Block) Instructions    * Do not drive a car, operate heavy machinery or dangerous equipment, or make important decisions for 12-24 hours. * Light activity as tolerated; may rest for the remainder of the day. * Resume pre-block medications including those from your other doctors. * Do not drink alcoholic beverages for 24 hours. Alcohol and the medications you have received may interact and cause an adverse reaction. * You may feel better this evening and worse tomorrow, as the numbing medications wears off and the steroid has yet to begin to work. After 48-72 hrs the steroid should begin to release bringing you relief. If you had a medial branch block, no steroids were used. The medial branch block is a test to see if you are a candidate for radiofrequency ablation (RFA). The anesthetic (numbing medicine)  will wear off by the next day. * You may shower this evening and remove any bandages. * Avoid hot tubs/pools/tub soaks and heating pads for 24 hours. You may use cold packs on the procedure site as tolerated for the first 24 hours. * If a headache develops, drink plenty of fluids and rest.  Take over the counter medications for headache if needed. If the headache continues longer than 24 hours, call MD at the [address] [place] [address] Avenue. [phone]    * Continue taking pain medications as needed. * You may resume your regular diet if tolerated. Otherwise, start with sips of water and advance slowly. * If Diabetic: check your blood sugar three times a day for the next 3 days. If your sugar is greater than 300 call your family doctor. If your sugar is greater than 400, have someone transport you to the nearest Emergency Room. * If you experience any of the following problems, Please Call the [address] [place] [address] Avenue at [phone].         * Excessive pain, swelling, redness or odor at or around the surgical area    * Fever of 101 or higher    * Nausea / Vomiting lasting longer than 4 hours or if unable to take medications. * Severe Headache    * Weakness or numbness in arms or legs that is not      resolving   * Any NEW signs of decreased circulation or nerve impairment in leg: change in color, swelling, persistent numbness, tingling                    * Prolonged increase in pain greater than 4 days      PATIENT INSTRUCTIONS:    After oral sedation, for 12-24 hours or while taking prescription Narcotics:  · Limit your activities  · Do not drive and operate hazardous machinery  · Do not make important personal or business decisions  · Do  not drink alcoholic beverages  · If you have not urinated within 8 hours after discharge, please contact your surgeon on call. *  Please give a list of your current medications to your Primary Care Provider. *  Please update this list whenever your medications are discontinued, doses are      changed, or new medications (including over-the-counter products) are added. *  Please carry medication information at all times in case of emergency situations. These are general instructions for a healthy lifestyle:    No smoking/ No tobacco products/ Avoid exposure to second hand smoke    Surgeon General's Warning:  Quitting smoking now greatly reduces serious risk to your health. Obesity, smoking, and sedentary lifestyle greatly increases your risk for illness    A healthy diet, regular physical exercise & weight monitoring are important for maintaining a healthy lifestyle    You may be retaining fluid if you have a history of heart failure or if you experience any of the following symptoms:  Weight gain of 3 pounds or more overnight or 5 pounds in a week, increased swelling in our hands or feet or shortness of breath while lying flat in bed.   Please call your doctor as soon as you notice any of these symptoms; do not wait until your next office visit. Recognize signs and symptoms of STROKE:    F-face looks uneven    A-arms unable to move or move unevenly    S-speech slurred or non-existent    T-time-call 911 as soon as signs and symptoms begin-DO NOT go       Back to bed or wait to see if you get better-TIME IS BRAIN.

## 2022-02-10 NOTE — PROGRESS NOTES
[facility]  [address], [address]   Phone: [phone]  Fax: [phone]        PROGRESS NOTE      HISTORY OF PRESENT ILLNESS:  The patient is a 64 y.o. female and was seen today for follow up of pain and paraesthesias in the BLE (RLE>LLE) to the ankles. Her paraesthesias is worse in the distal aspect of the BLE. Previously seen for right sided low back pain that radiates posteriorly down to her calf with intermittent radiation to her foot x few months. Previously, she was seen for right sided low back pain that radiates posteriorly down to her calf with intermittent radiation to her foot x a few months. Previously, she was seen for left-sided lower back pain radiating into the LLE in a S1 distribution to the ankle. Previously, she was seen for progressive, intermittent low back pain into the LLE radiating to the ankle x 8/2019 without trauma. Her pain is exacerbated with walking. Positive shopping cart sign. She has completed MDP without benefit. She is currently on Cymbalta 90 mg secondary to depression. Patient failed NEURONTIN 800 mg TID secondary to no relief. Pt underwent left L5 and S1 SNRB on 5/28/2020 with good relief. Pt underwent left-sided L5 and S1 SNRB on 1/21/2021 with relief.  She underwent a left L5-S1 SNRB on 6/3/2021. She states that her left side is doing well. Pt denies change in bowel or bladder habits. Pt denies fever, weight loss, or skin changes. Pt denies any signs of weakness. Patient denies previous spinal surgery or physical therapy/chiropractic care. Pt denies h/o chemotherapy, gastric bypass, alcohol abuse, or DM. PmHx of depression. Note from Dr. Ascencion Bumpers patient was seen with c/o pain and burning in the left leg. Indicated patient was on Cymbalta 60 mg and Neurontin 300 mg BID at that time. Note from Dr. Derick Wilkes patient reports no change since last visit.  Indicated she endorsed better benefit with the qhs dose of Neurontin. Indicated they increased her Neurontin to 600 mg TID and started her on a MDP at that time. Note from Dr. Carlton Potts 7/9/2020 indicating patient was seen with c/o right shoulder pain. Her pain is worse with overhead activity and at night. Indicated she has OA of the right shoulder. L Spine MRI dated 2/10/2020 films reviewed. Per report, grade 1 anterolistheses at L4-5 and L5-S1. Mild spinal canal stenosis at L2-3. Moderate spinal canal stenoses from L3-4 to L5-S1. Mild foraminal stenoses at L3-4 and L4-5. Moderate to severe bilateral foraminal stenoses at L5-S1 with potential L5 nerve root compression. Complex left renal cyst with fluid-hemorrhage level. Other small simple renal cysts.  LLE EMG dated 1/17/2020 suggested: sensorimotor peripheral neuropathy and chronic L5 and S1 radiculopathy. A BLE EMG dated 7/26/2021 by Dr. Kristin Duran was suggestive of Sensorimotor peripheral neuropathy, which appears to be unchanged from her last EMG of 1/2021. Chronic L5 and S1 radiculopathy bilaterally. At her last clinical appointment, I discussed with the patient about a neurophathy workup with Dr. Kristin Duran , pt declined at the time. I offered to order a bilateral L5-S1 SNRB, pt wished to proceed. Patient wished to continue her current treatment. I provided her with refills of Lyrica 300 mg BID.        The patient returns today with low back pain into the bilateral buttocks, occasionally into the BLE in a S1 distribution to the knees, R>L. She rates her pain 8/10, previously 0-9/10. Her pain is not exacerbated positionally. Pt was last seen by me on 9/1/2021 and was set up to f/u after block but failed to do so. Pt underwent bilateral L5/S1 SNRB's on 9/9/2021 with good relief up until 12/2021. She denies any falls or injury. Pt continues taking Lyrica 300 mg BID. Pt continues taking Cymbalta due to depression. Denies previous use of Topamax. Denies previous PT.  Pt denies change in bowel or bladder habits.  reviewed. There is no height or weight on file to calculate BMI. PCP: Chriss Keene MD      Past Medical History:   Diagnosis Date    Allergic rhinitis     on allergy shots    Anemia     Asthma     Borderline diabetic     Cataract     Chronic lung disease     Cigarette smoker     abuse quit     COPD (chronic obstructive pulmonary disease) ([facility] [address]     Degenerative arthritis     both shoulders    Depression     Easy bruising     Hypertension     Nervousness     Osteoporosis 10/27/99    Personal history of smoking 9/10/2020    Pharyngoesophageal dysphagia 9/10/2020        Social History     Socioeconomic History    Marital status:      Spouse name: Not on file    Number of children: Not on file    Years of education: Not on file    Highest education level: Not on file   Occupational History    Not on file   Tobacco Use    Smoking status: Former Smoker     Packs/day: 2.50     Years: 45.00     Pack years: 112.50     Types: Cigarettes     Start date: 9/10/1959     Quit date: 2006     Years since quittin.1    Smokeless tobacco: Never Used    Tobacco comment: smoked for 50 years   Substance and Sexual Activity    Alcohol use: Yes     Alcohol/week: 2.0 - 3.0 standard drinks     Types: 2 - 3 Standard drinks or equivalent per week     Comment: occ    Drug use: No    Sexual activity: Not Currently   Other Topics Concern    Not on file   Social History Narrative    Not on file     Social Determinants of Health     Financial Resource Strain:     Difficulty of Paying Living Expenses: Not on file   Food Insecurity:     Worried About Running Out of Food in the Last Year: Not on file    Jyoti of Food in the Last Year: Not on file   Transportation Needs:     Lack of Transportation (Medical): Not on file    Lack of Transportation (Non-Medical):  Not on file   Physical Activity:     Days of Exercise per Week: Not on file    Minutes of Exercise per Session: Not on file   Stress:     Feeling of Stress : Not on file   Social Connections:     Frequency of Communication with Friends and Family: Not on file    Frequency of Social Gatherings with Friends and Family: Not on file    Attends Buddhist Services: Not on file    Active Member of [address] [place] [address] or Organizations: Not on file    Attends Club or Organization Meetings: Not on file    Marital Status: Not on file   Intimate Partner Violence:     Fear of Current or Ex-Partner: Not on file    Emotionally Abused: Not on file    Physically Abused: Not on file    Sexually Abused: Not on file   Housing Stability:     Unable to Pay for Housing in the Last Year: Not on file    Number of [place] in the Last Year: Not on file    Unstable Housing in the Last Year: Not on file       Current Outpatient Medications   Medication Sig Dispense Refill    atorvastatin (LIPITOR) 20 mg tablet       pregabalin (LYRICA) 300 mg capsule Take 1 Capsule by mouth two (2) times a day. Max Daily Amount: 600 mg. 180 Capsule 0    umeclidinium (Incruse Ellipta) 62.5 mcg/actuation inhaler Take 1 Puff by inhalation daily. 1 Inhaler 3    pregabalin (LYRICA) 300 mg capsule TAKE ONE CAPSULE BY MOUTH TWICE A DAY. MAX TWO CAPSULES IN 24 HOURS. (Patient not taking: Reported on 9/9/2021) 60 Capsule 2    arformoteroL (Brovana) 15 mcg/2 mL nebu neb solution INHALE TWO MILLILITERS (ONE VIAL) BY MOUTH TWICE A DAY 60 Vial 3    pregabalin (LYRICA) 225 mg capsule TAKE ONE CAPSULE BY MOUTH TWICE A DAY. MAX DAILY AMOUNT: 450 MG. (Patient not taking: Reported on 6/24/2021) 60 Capsule 1    naproxen (Naprosyn) 500 mg tablet 1 tablet by mouth every 8-12 hours as needed for shoulder pain (Patient not taking: Reported on 9/1/2021) 20 Tab 0    buPROPion SR (WELLBUTRIN SR) 100 mg SR tablet       albuterol (Ventolin HFA) 90 mcg/actuation inhaler Take 2 Puffs by inhalation every four (4) hours as needed for Wheezing.  1 Inhaler 2    doxycycline (VIBRAMYCIN) 100 mg capsule Take 1 Cap by mouth two (2) times a day. 20 Cap 0    pregabalin (Lyrica) 150 mg capsule Take 1 Cap by mouth two (2) times a day. Max Daily Amount: 300 mg. (Patient not taking: Reported on 6/17/2021) 60 Cap 1    pregabalin (LYRICA) 100 mg capsule TAKE ONE CAPSULE BY MOUTH TWICE A DAY. MAX DAILY AMOUNT: 200MG (Patient not taking: Reported on 6/17/2021) 60 Cap 0    pregabalin (LYRICA) 50 mg capsule Take 1 Cap by mouth two (2) times a day. Max Daily Amount: 100 mg. (Patient not taking: Reported on 6/17/2021) 180 Cap 0    albuterol sulfate (PROVENTIL;VENTOLIN) 2.5 mg/0.5 mL nebu nebulizer solution 2.5 mg by Nebulization route every four (4) hours as needed for Wheezing or Shortness of Breath. [employer]      gabapentin (Neurontin) 400 mg capsule Take 1 cap tid x 1 week, then 1 cap bid x 1 week, then 1 cap every day x 1 week and then stop (Patient not taking: Reported on 6/17/2021) 42 Cap 0    pregabalin (LYRICA) 50 mg capsule Take 1 Cap by mouth two (2) times a day. Max Daily Amount: 100 mg. (Patient not taking: Reported on 6/17/2021) 60 Cap 1    pregabalin (LYRICA) 50 mg capsule Take 1 Cap by mouth two (2) times a day. Max Daily Amount: 100 mg. (Patient not taking: Reported on 6/17/2021) 60 Cap 1    meloxicam (MOBIC) 15 mg tablet Take 15 mg by mouth as needed.  DULoxetine (CYMBALTA) 20 mg capsule Take 90 mg by mouth daily.  pravastatin (PRAVACHOL) 20 mg tablet  (Patient not taking: Reported on 6/17/2021)      gabapentin (NEURONTIN) 100 mg capsule 600 mg. (Patient not taking: Reported on 9/9/2021)      cyclobenzaprine (FLEXERIL) 5 mg tablet Take 5 mg by mouth three (3) times daily as needed for Muscle Spasm(s).  (Patient not taking: Reported on 6/3/2021)      lisinopril (PRINIVIL, ZESTRIL) 10 mg tablet TAKE 1 TABLET BY MOUTH DAILY 30 Tab 0    sertraline (ZOLOFT) 100 mg tablet TAKE 1 TABLET BY MOUTH EVERY DAY 30 Tab 6    diazepam (VALIUM) 5 mg tablet Take 1 Tab by mouth two (2) times daily as needed for Anxiety. 60 Tab 2       Allergies   Allergen Reactions    Qvar [Beclomethasone Dipropionate] Shortness of Breath    Entex [Phenylephrine-Guaifenesin] Other (comments)     intolerance    Sulfa (Sulfonamide Antibiotics) Other (comments)          PHYSICAL EXAMINATION    Visit Vitals  LMP 01/01/1974       CONSTITUTIONAL: NAD, A&O x 3  SENSATION: Intact to light touch throughout  RANGE OF MOTION: The patient has full passive range of motion in all four extremities. MOTOR:  Straight Leg Raise: Negative, bilateral               Hip Flex Knee Ext Knee Flex Ankle DF GTE Ankle PF Tone   Right +4/5 +4/5 +4/5 +4/5 +4/5 +4/5 +4/5   Left +4/5 +4/5 +4/5 +4/5 +4/5 +4/5 +4/5       ASSESSMENT   Diagnoses and all orders for this visit:    1. Lumbosacral spondylosis without myelopathy    2. Spinal stenosis of lumbar region, unspecified whether neurogenic claudication present    3. Lumbar neuritis    4. DDD (degenerative disc disease), lumbar    5. Idiopathic peripheral neuropathy    6. Spondylolisthesis, acquired      IMPRESSION AND PLAN:  Patient returns to the office today with c/o low back pain into the bilateral buttocks, occasionally into the BLE in a S1 distribution to the knees, R>L. Multiple treatment options were discussed. I offered a change in neuropathic medication, pt declined at this time as she wishes to proceed with a block prior to proceeding w/ new medication. I will order bilateral L5-S1 SNRB's. I provided her refills of Lyrica 300 mg BID. Patient is neurologically intact. I will see the patient back following block or earlier if needed. Written by Bing Baeza, as dictated by Soledad Rey MD  I examined the patient, reviewed and agree with the note.

## 2022-02-14 ENCOUNTER — OFFICE VISIT (OUTPATIENT)
Dept: ORTHOPEDIC SURGERY | Age: 77
End: 2022-02-14
Payer: MEDICARE

## 2022-02-14 VITALS
TEMPERATURE: 98.6 F | BODY MASS INDEX: 30.7 KG/M2 | HEIGHT: 64 IN | DIASTOLIC BLOOD PRESSURE: 65 MMHG | HEART RATE: 90 BPM | OXYGEN SATURATION: 94 % | WEIGHT: 179.8 LBS | SYSTOLIC BLOOD PRESSURE: 129 MMHG

## 2022-02-14 DIAGNOSIS — M48.061 SPINAL STENOSIS OF LUMBAR REGION, UNSPECIFIED WHETHER NEUROGENIC CLAUDICATION PRESENT: ICD-10-CM

## 2022-02-14 DIAGNOSIS — M43.10 SPONDYLOLISTHESIS, ACQUIRED: ICD-10-CM

## 2022-02-14 DIAGNOSIS — M51.36 DDD (DEGENERATIVE DISC DISEASE), LUMBAR: ICD-10-CM

## 2022-02-14 DIAGNOSIS — G60.9 IDIOPATHIC PERIPHERAL NEUROPATHY: ICD-10-CM

## 2022-02-14 DIAGNOSIS — M47.817 LUMBOSACRAL SPONDYLOSIS WITHOUT MYELOPATHY: Primary | ICD-10-CM

## 2022-02-14 DIAGNOSIS — M54.16 LUMBAR NEURITIS: ICD-10-CM

## 2022-02-14 PROCEDURE — 99213 OFFICE O/P EST LOW 20 MIN: CPT | Performed by: PHYSICAL MEDICINE & REHABILITATION

## 2022-02-14 PROCEDURE — G8427 DOCREV CUR MEDS BY ELIG CLIN: HCPCS | Performed by: PHYSICAL MEDICINE & REHABILITATION

## 2022-02-14 PROCEDURE — G8536 NO DOC ELDER MAL SCRN: HCPCS | Performed by: PHYSICAL MEDICINE & REHABILITATION

## 2022-02-14 PROCEDURE — 1101F PT FALLS ASSESS-DOCD LE1/YR: CPT | Performed by: PHYSICAL MEDICINE & REHABILITATION

## 2022-02-14 PROCEDURE — G8419 CALC BMI OUT NRM PARAM NOF/U: HCPCS | Performed by: PHYSICAL MEDICINE & REHABILITATION

## 2022-02-14 PROCEDURE — G8432 DEP SCR NOT DOC, RNG: HCPCS | Performed by: PHYSICAL MEDICINE & REHABILITATION

## 2022-02-14 PROCEDURE — 1090F PRES/ABSN URINE INCON ASSESS: CPT | Performed by: PHYSICAL MEDICINE & REHABILITATION

## 2022-02-14 RX ORDER — BUDESONIDE 0.5 MG/2ML
INHALANT ORAL
COMMUNITY
Start: 2022-02-12

## 2022-02-14 RX ORDER — TOPIRAMATE 25 MG/1
25 TABLET ORAL DAILY
Qty: 30 TABLET | Refills: 1 | Status: CANCELLED | OUTPATIENT
Start: 2022-02-14

## 2022-02-14 RX ORDER — PREGABALIN 300 MG/1
300 CAPSULE ORAL 2 TIMES DAILY
Qty: 60 CAPSULE | Refills: 2 | Status: SHIPPED | OUTPATIENT
Start: 2022-02-14 | End: 2022-05-27

## 2022-02-14 NOTE — LETTER
2/14/2022    Patient: Kalin Steinberg   YOB: 1945   Date of Visit: 2/14/2022     Bree Hillman MD  [address] [place] [id number]  Via Fax: [phone]    Dear Bree Hillman MD,      Thank you for referring Ms. Frances Amanda to [facility] [address] for evaluation. My notes for this consultation are attached. If you have questions, please do not hesitate to call me. I look forward to following your patient along with you.       Sincerely,    Sarah Veliz MD

## 2022-03-08 ENCOUNTER — TELEPHONE (OUTPATIENT)
Dept: ORTHOPEDIC SURGERY | Age: 77
End: 2022-03-08

## 2022-03-08 NOTE — TELEPHONE ENCOUNTER
Patients block level changed . She is only having the Left side done L5 and S1- because she can only have 2 injections per date of service.  Everything changed on paperwork and faxed over to block suite

## 2022-03-10 ENCOUNTER — APPOINTMENT (OUTPATIENT)
Dept: GENERAL RADIOLOGY | Age: 77
End: 2022-03-10
Attending: PHYSICAL MEDICINE & REHABILITATION
Payer: MEDICARE

## 2022-03-10 ENCOUNTER — HOSPITAL ENCOUNTER (OUTPATIENT)
Age: 77
Setting detail: OUTPATIENT SURGERY
Discharge: HOME | End: 2022-03-10
Attending: PHYSICAL MEDICINE & REHABILITATION | Admitting: PHYSICAL MEDICINE & REHABILITATION
Payer: MEDICARE

## 2022-03-10 VITALS
SYSTOLIC BLOOD PRESSURE: 162 MMHG | HEART RATE: 95 BPM | TEMPERATURE: 98.6 F | RESPIRATION RATE: 16 BRPM | OXYGEN SATURATION: 95 % | DIASTOLIC BLOOD PRESSURE: 88 MMHG

## 2022-03-10 PROCEDURE — 64484 NJX AA&/STRD TFRM EPI L/S EA: CPT | Performed by: PHYSICAL MEDICINE & REHABILITATION

## 2022-03-10 PROCEDURE — 77030003669 HC NDL SPN COOK -B: Performed by: PHYSICAL MEDICINE & REHABILITATION

## 2022-03-10 PROCEDURE — 77030039433 HC TY MYLEOGRAM BD -B: Performed by: PHYSICAL MEDICINE & REHABILITATION

## 2022-03-10 PROCEDURE — 76010000009 HC PAIN MGT 0 TO 30 MIN PROC: Performed by: PHYSICAL MEDICINE & REHABILITATION

## 2022-03-10 PROCEDURE — 2709999900 HC NON-CHARGEABLE SUPPLY: Performed by: PHYSICAL MEDICINE & REHABILITATION

## 2022-03-10 PROCEDURE — 74011250637 HC RX REV CODE- 250/637: Performed by: PHYSICAL MEDICINE & REHABILITATION

## 2022-03-10 PROCEDURE — 64483 NJX AA&/STRD TFRM EPI L/S 1: CPT | Performed by: PHYSICAL MEDICINE & REHABILITATION

## 2022-03-10 PROCEDURE — 74011250636 HC RX REV CODE- 250/636: Performed by: PHYSICAL MEDICINE & REHABILITATION

## 2022-03-10 PROCEDURE — 74011000636 HC RX REV CODE- 636: Performed by: PHYSICAL MEDICINE & REHABILITATION

## 2022-03-10 PROCEDURE — 74011000250 HC RX REV CODE- 250: Performed by: PHYSICAL MEDICINE & REHABILITATION

## 2022-03-10 PROCEDURE — 77030003676 HC NDL SPN MPRI -A: Performed by: PHYSICAL MEDICINE & REHABILITATION

## 2022-03-10 RX ORDER — DEXAMETHASONE SODIUM PHOSPHATE 100 MG/10ML
INJECTION INTRAMUSCULAR; INTRAVENOUS AS NEEDED
Status: DISCONTINUED | OUTPATIENT
Start: 2022-03-10 | End: 2022-03-10 | Stop reason: HOSPADM

## 2022-03-10 RX ORDER — LIDOCAINE HYDROCHLORIDE 10 MG/ML
INJECTION, SOLUTION EPIDURAL; INFILTRATION; INTRACAUDAL; PERINEURAL AS NEEDED
Status: DISCONTINUED | OUTPATIENT
Start: 2022-03-10 | End: 2022-03-10 | Stop reason: HOSPADM

## 2022-03-10 RX ORDER — DIAZEPAM 5 MG/1
5-20 TABLET ORAL ONCE
Status: COMPLETED | OUTPATIENT
Start: 2022-03-10 | End: 2022-03-10

## 2022-03-10 RX ADMIN — DIAZEPAM 10 MG: 5 TABLET ORAL at 11:58

## 2022-03-10 NOTE — PERIOP NOTES
Patient consented verbally to HIPAA, and verbalized understanding of discharge instructions. Signature pad not working.

## 2022-03-10 NOTE — PERIOP NOTES
Patient tolerated procedure well. No complications noted. VSS. No redness, swelling, or bleeding from injection site. Dressing dry and intact. Armband removed and shredded. Patient wheeled to main entrance by RN, assisted into vehicle, and discharged alive and well, in stable condition.

## 2022-03-10 NOTE — PROCEDURES
PROCEDURE NOTE      Patient Name: Mignon Jamil    Date of Procedure: March 10, 2022    Preoperative Diagnosis:  Lumbosacral spondylosis without myelopathy [M47.817]    Post Operative Diagnosis:  Lumbosacral spondylosis without myelopathy [M47.817]    Procedure :    bilateral  L5 Selective Nerve Root Block  bilateral  S1 Selective Nerve Root Block      Consent:  Informed consent was obtained prior to the procedure. The patient was given the opportunity to ask questions regarding the procedure and its associated risks. In addition to the potential risks associated with the procedure itself, the patient was informed both verbally and in writing of the potential side effects of the use of glucocorticoid. The patient appeared to comprehend the informed consent and desired to have the procedure performed. Procedure: The patient was placed in the prone position on the fluoroscopy table and the back was prepped and draped in the usual sterile manner. The exact spinal level was  identified using fluoroscopy, and Lidocaine 1 % was injected locally, a # 22 gauge spinal needle was passed to the transverse process. The depth was noted and the needle redirected to pass inferior and approximately one cm anterior to the transverse process. YES  1 cc of Isovue M-200 was used to verify positioning in the epidural and paravertebral space and outlined the course of the spinal nerve into the epidural space. The same procedure was repeated at each spinal level indicated above. A total of 10 mg of preservative free Dexamethasone and 4 cc of Lidocaine was slowly injected. The patient tolerated the procedure well. The injection area was cleaned and bandaids applied. Not excessive bleeding was noted. Patient dressed and discharged to home with instructions. Discussion: The patient tolerated the procedure well.                                               José Miguel Rajan MD  March 10, 2022

## 2022-03-18 PROBLEM — R13.14 PHARYNGOESOPHAGEAL DYSPHAGIA: Status: ACTIVE | Noted: 2020-09-10

## 2022-03-19 PROBLEM — R91.8 LUNG NODULES: Status: ACTIVE | Noted: 2021-03-09

## 2022-03-19 PROBLEM — J98.11 ATELECTASIS: Status: ACTIVE | Noted: 2020-06-18

## 2022-03-19 PROBLEM — R06.02 SOB (SHORTNESS OF BREATH): Status: ACTIVE | Noted: 2020-06-18

## 2022-03-19 PROBLEM — Z87.891 PERSONAL HISTORY OF SMOKING: Status: ACTIVE | Noted: 2020-09-10

## 2022-05-27 ENCOUNTER — OFFICE VISIT (OUTPATIENT)
Dept: ORTHOPEDIC SURGERY | Age: 77
End: 2022-05-27
Payer: MEDICARE

## 2022-05-27 VITALS
OXYGEN SATURATION: 92 % | WEIGHT: 177 LBS | HEART RATE: 96 BPM | HEIGHT: 64 IN | RESPIRATION RATE: 14 BRPM | TEMPERATURE: 98.3 F | BODY MASS INDEX: 30.22 KG/M2

## 2022-05-27 DIAGNOSIS — M54.16 LUMBAR NEURITIS: ICD-10-CM

## 2022-05-27 DIAGNOSIS — M48.061 SPINAL STENOSIS OF LUMBAR REGION, UNSPECIFIED WHETHER NEUROGENIC CLAUDICATION PRESENT: ICD-10-CM

## 2022-05-27 PROCEDURE — G8510 SCR DEP NEG, NO PLAN REQD: HCPCS | Performed by: PHYSICAL MEDICINE & REHABILITATION

## 2022-05-27 PROCEDURE — G8427 DOCREV CUR MEDS BY ELIG CLIN: HCPCS | Performed by: PHYSICAL MEDICINE & REHABILITATION

## 2022-05-27 PROCEDURE — 99213 OFFICE O/P EST LOW 20 MIN: CPT | Performed by: PHYSICAL MEDICINE & REHABILITATION

## 2022-05-27 PROCEDURE — 1123F ACP DISCUSS/DSCN MKR DOCD: CPT | Performed by: PHYSICAL MEDICINE & REHABILITATION

## 2022-05-27 PROCEDURE — 1090F PRES/ABSN URINE INCON ASSESS: CPT | Performed by: PHYSICAL MEDICINE & REHABILITATION

## 2022-05-27 PROCEDURE — 1101F PT FALLS ASSESS-DOCD LE1/YR: CPT | Performed by: PHYSICAL MEDICINE & REHABILITATION

## 2022-05-27 PROCEDURE — G8417 CALC BMI ABV UP PARAM F/U: HCPCS | Performed by: PHYSICAL MEDICINE & REHABILITATION

## 2022-05-27 PROCEDURE — G8536 NO DOC ELDER MAL SCRN: HCPCS | Performed by: PHYSICAL MEDICINE & REHABILITATION

## 2022-05-27 RX ORDER — PREGABALIN 300 MG/1
300 CAPSULE ORAL 2 TIMES DAILY
Qty: 180 CAPSULE | Refills: 0 | Status: SHIPPED | OUTPATIENT
Start: 2022-05-27 | End: 2022-08-25 | Stop reason: SDUPTHER

## 2022-05-27 NOTE — LETTER
5/27/2022    Patient: Efrain Lei   YOB: 1945   Date of Visit: 5/27/2022     Nicolas Renner MD  [address] [place] [phone]  Via Fax: [phone]    Dear Nicolas Renner MD,      Thank you for referring Ms. Marie Newman to [facility] [address] for evaluation. My notes for this consultation are attached. If you have questions, please do not hesitate to call me. I look forward to following your patient along with you.       Sincerely,    Lynn Fuentes MD

## 2022-05-27 NOTE — PROGRESS NOTES
[facility]  [address], [place]   Phone: [phone]  Fax: [phone]        PROGRESS NOTE      HISTORY OF PRESENT ILLNESS:  The patient is a 64 y.o. female and was seen today for follow up of pain and paraesthesias in the BLE (RLE>LLE) to the ankles. Her paraesthesias is worse in the distal aspect of the BLE. Previously seen for right sided low back pain that radiates posteriorly down to her calf with intermittent radiation to her foot x few months. Previously, she was seen for right sided low back pain that radiates posteriorly down to her calf with intermittent radiation to her foot x a few months. Previously, she was seen for left-sided lower back pain radiating into the LLE in a S1 distribution to the ankle. Previously, she was seen for progressive, intermittent low back pain into the LLE radiating to the ankle x 8/2019 without trauma. Her pain is exacerbated with walking. Positive shopping cart sign. She has completed MDP without benefit. She is currently on Cymbalta 90 mg secondary to depression. Patient failed NEURONTIN 800 mg TID secondary to no relief. Pt underwent left L5 and S1 SNRB on 5/28/2020 with good relief. Pt underwent left-sided L5 and S1 SNRB on 1/21/2021 with relief.  She underwent a left L5-S1 SNRB on 6/3/2021. She states that her left side is doing well. Pt denies change in bowel or bladder habits. Pt denies fever, weight loss, or skin changes. Pt denies any signs of weakness. Patient denies previous spinal surgery or physical therapy/chiropractic care. Pt denies h/o chemotherapy, gastric bypass, alcohol abuse, or DM. PmHx of depression. Note from Dr. Vitaliy Sosa patient was seen with c/o pain and burning in the left leg. Indicated patient was on Cymbalta 60 mg and Neurontin 300 mg BID at that time. Note from Dr. Katie August patient reports no change since last visit.  Indicated she endorsed better benefit with the qhs dose of Neurontin. Indicated they increased her Neurontin to 600 mg TID and started her on a MDP at that time. Note from Dr. Pankaj Smith 7/9/2020 indicating patient was seen with c/o right shoulder pain. Her pain is worse with overhead activity and at night. Indicated she has OA of the right shoulder. L Spine MRI dated 2/10/2020 films reviewed. Per report, grade 1 anterolistheses at L4-5 and L5-S1. Mild spinal canal stenosis at L2-3. Moderate spinal canal stenoses from L3-4 to L5-S1. Mild foraminal stenoses at L3-4 and L4-5. Moderate to severe bilateral foraminal stenoses at L5-S1 with potential L5 nerve root compression. Complex left renal cyst with fluid-hemorrhage level. Other small simple renal cysts.  LLE EMG dated 1/17/2020 suggested: sensorimotor peripheral neuropathy and chronic L5 and S1 radiculopathy. A BLE EMG dated 7/26/2021 by Dr. Cheo West suggestive of Sensorimotor peripheral neuropathy, which appears to be unchanged from her last EMG of 1/2021. Chronic L5 and S1 radiculopathy bilaterally.  I discussed with the patient about a neurophathy workup with Dr. Leo Mckay , pt declined at the time. I offered to order a bilateral L5-S1 SNRB, pt wished to proceed. Patient wished to continue her current treatment. I provided her with refills of Lyrica 300 mg. BID. At her last clinical appointment, I offered a change in neuropathic medication, pt declined at this time as she wished to proceed with a block prior to proceeding w/ new medication. I ordered bilateral L5-S1 SNRB's. I provided her refills of Lyrica 300 mg BID. The patient returns today with low back pain that intermittently radiates into her BLE in the S1 distribution. She rates her pain 0-3/10, previously 8/10. Patient underwent B/L L5, B/L S1 SNRB 3/10/2022 with benefit. She tolerates Lyrica 300 mg BID with benefit. Pt continues to take Cymbalta for depression. Denies physical therapy.  reviewed.  Body mass index is 30.38 kg/m². PCP: Kenna Pickard MD      Past Medical History:   Diagnosis Date    Allergic rhinitis     on allergy shots    Anemia     Asthma     Borderline diabetic     Cataract     Chronic lung disease     Cigarette smoker     abuse quit     COPD (chronic obstructive pulmonary disease) ([facility] [address]     Degenerative arthritis     both shoulders    Depression     Easy bruising     Hypertension     Nervousness     Osteoporosis 10/27/99    Personal history of smoking 9/10/2020    Pharyngoesophageal dysphagia 9/10/2020        Social History     Socioeconomic History    Marital status:      Spouse name: Not on file    Number of children: Not on file    Years of education: Not on file    Highest education level: Not on file   Occupational History    Not on file   Tobacco Use    Smoking status: Former Smoker     Packs/day: 2.50     Years: 45.00     Pack years: 112.50     Types: Cigarettes     Start date: 9/10/1959     Quit date: 2006     Years since quittin.4    Smokeless tobacco: Never Used    Tobacco comment: smoked for 50 years   Vaping Use    Vaping Use: Never used   Substance and Sexual Activity    Alcohol use: Not Currently     Alcohol/week: 2.0 - 3.0 standard drinks     Types: 2 - 3 Standard drinks or equivalent per week     Comment: occ    Drug use: No    Sexual activity: Not Currently   Other Topics Concern    Not on file   Social History Narrative    Not on file     Social Determinants of Health     Financial Resource Strain:     Difficulty of Paying Living Expenses: Not on file   Food Insecurity:     Worried About Running Out of Food in the Last Year: Not on file    Jyoti of Food in the Last Year: Not on file   Transportation Needs:     Lack of Transportation (Medical): Not on file    Lack of Transportation (Non-Medical):  Not on file   Physical Activity:     Days of Exercise per Week: Not on file    Minutes of Exercise per Session: Not on file Stress:     Feeling of Stress : Not on file   Social Connections:     Frequency of Communication with Friends and Family: Not on file    Frequency of Social Gatherings with Friends and Family: Not on file    Attends Baptism Services: Not on file    Active Member of [address] [place] [address] or Organizations: Not on file    Attends Club or Organization Meetings: Not on file    Marital Status: Not on file   Intimate Partner Violence:     Fear of Current or Ex-Partner: Not on file    Emotionally Abused: Not on file    Physically Abused: Not on file    Sexually Abused: Not on file   Housing Stability:     Unable to Pay for Housing in the Last Year: Not on file    Number of [place] in the Last Year: Not on file    Unstable Housing in the Last Year: Not on file       Current Outpatient Medications   Medication Sig Dispense Refill    pregabalin (LYRICA) 300 mg capsule Take 1 Capsule by mouth two (2) times a day. Max Daily Amount: 600 mg. 180 Capsule 0    tiotropium bromide (Spiriva Respimat) 2.5 mcg/actuation inhaler 2 puffs      budesonide (PULMICORT) 0.5 mg/2 mL nbsp       atorvastatin (LIPITOR) 20 mg tablet       umeclidinium (Incruse Ellipta) 62.5 mcg/actuation inhaler Take 1 Puff by inhalation daily. 1 Inhaler 3    arformoteroL (Brovana) 15 mcg/2 mL nebu neb solution INHALE TWO MILLILITERS (ONE VIAL) BY MOUTH TWICE A DAY 60 Vial 3    buPROPion SR (WELLBUTRIN SR) 100 mg SR tablet       albuterol (Ventolin HFA) 90 mcg/actuation inhaler Take 2 Puffs by inhalation every four (4) hours as needed for Wheezing. 1 Inhaler 2    doxycycline (VIBRAMYCIN) 100 mg capsule Take 1 Cap by mouth two (2) times a day. 20 Cap 0    albuterol sulfate (PROVENTIL;VENTOLIN) 2.5 mg/0.5 mL nebu nebulizer solution 2.5 mg by Nebulization route every four (4) hours as needed for Wheezing or Shortness of Breath. [employer]      meloxicam (MOBIC) 15 mg tablet Take 15 mg by mouth as needed.       DULoxetine (CYMBALTA) 20 mg capsule Take 90 mg by mouth daily.  cyclobenzaprine (FLEXERIL) 5 mg tablet Take 5 mg by mouth three (3) times daily as needed for Muscle Spasm(s).  lisinopril (PRINIVIL, ZESTRIL) 10 mg tablet TAKE 1 TABLET BY MOUTH DAILY 30 Tab 0    sertraline (ZOLOFT) 100 mg tablet TAKE 1 TABLET BY MOUTH EVERY DAY 30 Tab 6    diazepam (VALIUM) 5 mg tablet Take 1 Tab by mouth two (2) times daily as needed for Anxiety. 60 Tab 2    naproxen (Naprosyn) 500 mg tablet 1 tablet by mouth every 8-12 hours as needed for shoulder pain (Patient not taking: Reported on 9/1/2021) 20 Tab 0    gabapentin (Neurontin) 400 mg capsule Take 1 cap tid x 1 week, then 1 cap bid x 1 week, then 1 cap every day x 1 week and then stop (Patient not taking: Reported on 6/17/2021) 42 Cap 0    pravastatin (PRAVACHOL) 20 mg tablet  (Patient not taking: Reported on 6/17/2021)      gabapentin (NEURONTIN) 100 mg capsule 600 mg. (Patient not taking: Reported on 9/9/2021)         Allergies   Allergen Reactions    Qvar [Beclomethasone Dipropionate] Shortness of Breath    Entex [Phenylephrine-Guaifenesin] Other (comments)     intolerance    Sulfa (Sulfonamide Antibiotics) Other (comments)          PHYSICAL EXAMINATION    Visit Vitals  Pulse 96   Temp 98.3 °F (36.8 °C) (Temporal)   Resp 14   Ht 5' 4\" (1.626 m)   Wt 177 lb (80.3 kg)   LMP 01/01/1974   SpO2 92%   BMI 30.38 kg/m²       CONSTITUTIONAL: NAD, A&O x 3  SENSATION: Intact to light touch throughout  NEURO: Hemal's is negative bilaterally. RANGE OF MOTION: The patient has full passive range of motion in all four extremities.   MOTOR:  Straight Leg Raise: Negative, bilateral       Shoulder AB/Flex Elbow Flex Wrist Ext Elbow Ext Wrist Flex Hand Intrin Tone   Right +4/5 +4/5 +4/5 +4/5 +4/5 +4/5 +4/5   Left +4/5 +4/5 +4/5 +4/5 +4/5 +4/5 +4/5              Hip Flex Knee Ext Knee Flex Ankle DF GTE Ankle PF Tone   Right +4/5 +4/5 +4/5 +4/5 +4/5 +4/5 +4/5   Left +4/5 +4/5 +4/5 +4/5 +4/5 +4/5 +4/5       ASSESSMENT Diagnoses and all orders for this visit:    1. Spinal stenosis of lumbar region, unspecified whether neurogenic claudication present  -     pregabalin (LYRICA) 300 mg capsule; Take 1 Capsule by mouth two (2) times a day. Max Daily Amount: 600 mg.    2. Lumbar neuritis  -     pregabalin (LYRICA) 300 mg capsule; Take 1 Capsule by mouth two (2) times a day. Max Daily Amount: 600 mg. IMPRESSION AND PLAN:  Patient returns to the office today with c/o low back pain that intermittently radiates into her BLE in the S1 distribution . Multiple treatment options were discussed. Pt would like to continue to current course of treatment. I refilled her Lyrica. Patient is neurologically intact. I will see the patient back in 3 month's time or earlier if needed. Written by Edgar Whiting, as dictated by Danish Santos MD  I examined the patient, reviewed and agree with the note.

## 2022-06-02 ENCOUNTER — OFFICE VISIT (OUTPATIENT)
Dept: NEUROLOGY | Age: 77
End: 2022-06-02
Payer: MEDICARE

## 2022-06-02 DIAGNOSIS — Z86.59 HISTORY OF DEPRESSION: ICD-10-CM

## 2022-06-02 DIAGNOSIS — R41.3 MEMORY LOSS: Primary | ICD-10-CM

## 2022-06-02 DIAGNOSIS — F41.8 ANXIETY ABOUT HEALTH: ICD-10-CM

## 2022-06-02 PROCEDURE — 90791 PSYCH DIAGNOSTIC EVALUATION: CPT | Performed by: PSYCHOLOGIST

## 2022-06-02 PROCEDURE — G8427 DOCREV CUR MEDS BY ELIG CLIN: HCPCS | Performed by: PSYCHOLOGIST

## 2022-06-02 PROCEDURE — 1123F ACP DISCUSS/DSCN MKR DOCD: CPT | Performed by: PSYCHOLOGIST

## 2022-06-02 PROCEDURE — G8432 DEP SCR NOT DOC, RNG: HCPCS | Performed by: PSYCHOLOGIST

## 2022-06-02 PROCEDURE — G8536 NO DOC ELDER MAL SCRN: HCPCS | Performed by: PSYCHOLOGIST

## 2022-06-02 PROCEDURE — G8417 CALC BMI ABV UP PARAM F/U: HCPCS | Performed by: PSYCHOLOGIST

## 2022-06-02 NOTE — PROGRESS NOTES
[facility]   [address] [place] [address]. [facility] [place], [address]  Office:  [phone]  Fax: [phone]                  Initial Office Exam  Patient Name: Efrain Lei  Age: 64 y.o. Gender: female   Handedness: right handed   Presenting Concern: memory loss  Primary Care Physician: Roseline Caldera MD  Referring Provider: Wilder Figueroa MD      REASON FOR REFERRAL:  This comprehensive and medically necessary neuropsychological assessment was requested to assist a differential diagnosis of memory complaints. The use and purpose of this examination, as well as the extent and limitations of confidentiality, were explained prior to obtaining permission to participate. Instructions were provided regarding the necessity to put forth optimal effort and answer questions truthfully in order to obtain reliable and accurate test results. REVIEW OF RECORDS:  Ms. Scherrie Nyhan was referred by her PCP for a workup of memory loss. A history of mixed hyperlipidemia, hypertension, COPD, and anxiety are noted. Cymbalta was recently increased for anxiety and depression; Diazepam is prescribed for panic attacks. I did not see any neuroimaging in records. Current Outpatient Medications   Medication Sig    pregabalin (LYRICA) 300 mg capsule Take 1 Capsule by mouth two (2) times a day. Max Daily Amount: 600 mg.  tiotropium bromide (Spiriva Respimat) 2.5 mcg/actuation inhaler 2 puffs    budesonide (PULMICORT) 0.5 mg/2 mL nbsp     atorvastatin (LIPITOR) 20 mg tablet     umeclidinium (Incruse Ellipta) 62.5 mcg/actuation inhaler Take 1 Puff by inhalation daily.     arformoteroL (Brovana) 15 mcg/2 mL nebu neb solution INHALE TWO MILLILITERS (ONE VIAL) BY MOUTH TWICE A DAY    naproxen (Naprosyn) 500 mg tablet 1 tablet by mouth every 8-12 hours as needed for shoulder pain (Patient not taking: Reported on 9/1/2021)    buPROPion SR (WELLBUTRIN SR) 100 mg SR tablet     albuterol (Ventolin HFA) 90 mcg/actuation inhaler Take 2 Puffs by inhalation every four (4) hours as needed for Wheezing.  doxycycline (VIBRAMYCIN) 100 mg capsule Take 1 Cap by mouth two (2) times a day.  albuterol sulfate (PROVENTIL;VENTOLIN) 2.5 mg/0.5 mL nebu nebulizer solution 2.5 mg by Nebulization route every four (4) hours as needed for Wheezing or Shortness of Breath. [employer]    gabapentin (Neurontin) 400 mg capsule Take 1 cap tid x 1 week, then 1 cap bid x 1 week, then 1 cap every day x 1 week and then stop (Patient not taking: Reported on 6/17/2021)    meloxicam (MOBIC) 15 mg tablet Take 15 mg by mouth as needed.  DULoxetine (CYMBALTA) 20 mg capsule Take 90 mg by mouth daily.  pravastatin (PRAVACHOL) 20 mg tablet  (Patient not taking: Reported on 6/17/2021)    gabapentin (NEURONTIN) 100 mg capsule 600 mg. (Patient not taking: Reported on 9/9/2021)    cyclobenzaprine (FLEXERIL) 5 mg tablet Take 5 mg by mouth three (3) times daily as needed for Muscle Spasm(s).  lisinopril (PRINIVIL, ZESTRIL) 10 mg tablet TAKE 1 TABLET BY MOUTH DAILY    sertraline (ZOLOFT) 100 mg tablet TAKE 1 TABLET BY MOUTH EVERY DAY    diazepam (VALIUM) 5 mg tablet Take 1 Tab by mouth two (2) times daily as needed for Anxiety. No current facility-administered medications for this visit.        Past Medical History:   Diagnosis Date    Allergic rhinitis     on allergy shots    Anemia     Asthma     Borderline diabetic     Cataract     Chronic lung disease     Cigarette smoker     abuse quit 2006    COPD (chronic obstructive pulmonary disease) ([facility] [address]     Degenerative arthritis     both shoulders    Depression     Easy bruising     Hypertension     Nervousness     Osteoporosis 10/27/99    Personal history of smoking 9/10/2020    Pharyngoesophageal dysphagia 9/10/2020       Past Surgical History:   Procedure Laterality Date    HX BACK SURGERY      nerve block    HX GYN  1974    hysterectomy    HX GYN  [id number]    D & C    HX HEENT  age 3    tonsillectomy    HX ORTHOPAEDIC  age 21    right leg tumor removed benign       CLINICAL INTERVIEW:  Ms. Teresa Garcia was accompanied by her daughter for the initial interview. Consistent with records, they reported memory loss which first emerged 1-2 years ago. Ms. Teresa Garcia believes that memory loss has remained stable, her daughter believes that it is somewhat worsened. Neurologic history is negative for seizures, stroke, syncope, and syncopal and head trauma. There was sleep study has previously been recommended, Ms. Teresa Garcia has not pursued this. She sleeps with oxygen due to her COPD. Pain complaints include leg, back, and shoulder pain due to rotator cuff injuries, spinal stenosis, and neuropathy. There is no alcohol or illicit substance use. Tobacco use ceased in 2005. Family history of neurologic illness is significant for suspected dementia in the mother and stroke in the father. With regard to emotional functioning, Ms. Teresa Garcia reported a history of anxiety and depression which first emerged in adolescence. She denied a history of psychiatric hospitalization, suicidal ideation, self-harm behaviors, psychotic symptoms, and psychological trauma. She has a history of benzodiazepine use and was recently started back on diazepam for panic attacks. She uses it infrequently. She has seen psychiatry in the past and did therapy several decades ago. Family history is significant for depression and anxiety in the mother and father. Socially, Ms. Teresa Garcia has been  since 1984 following 13 years of marriage. She has 1 daughter with whom she lives. She reported having good friend and family support but reported having limited hobbies and no exercise regimen. Academically, Ms. Teresa Garcia completed 11 years of education and later earned her GED. She denied a history of LD and ADHD.   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    Functionally, Ms. Lester Merlos receives assistance from her daughter for medication management and bill payment. She continues to drive. There are no POA designations but Ms. Lester Merlos intends to do this with an  soon. MENTAL STATUS:    Sensorium  Awake, Aware, Alert   Orientation person, place, time/date, situation, day of week and month of year   Relations cooperative   Eye Contact appropriate   Appearance:  age appropriate   Motor Behavior:  within normal limits   Speech:  normal pitch and normal volume   Vocabulary average   Thought Process: within normal limits   Thought Content free of delusions and free of hallucinations   Suicidal ideations none   Homicidal ideations none   Mood:  euthymic   Affect:  mood-congruent   Memory recent  impaired   Memory remote:  adequate   Concentration:  adequate   Abstraction:  abstract   Insight:  fair   Reliability fair   Judgment:  fair         DIAGNOSTIC IMPRESSIONS:  1. Cognitive Decline: R/O Major Neurocognitive Disorder  2. H/O Depressed Mood  3. Anxiety about health      PLAN:  1. Complete a comprehensive neuropsychological assessment to provide a differential diagnosis of presenting concerns as well as to assist with disposition and treatment planning as appropriate. 2. Consider compensatory and remedial cognitive training. 3. Consider nonpharmacological interventions for mood disorder. 4. Consider an adaptive driving evaluation. 5. Consider referral for elder health nurse to provide an in-home functional assessment. 6. Consider placement issues to provide greater structure and supervision to ensure safety, health and well-being. [address] x 1 Review of records. Face to face interview w/ patient. Determine test protocol: 60 minutes. Total 1 unit      Sasha Carbone, PHD  Licensed Clinical Psychologist    This note was created using voice recognition software. Despite editing, there may be syntax errors.

## 2022-08-24 NOTE — PROGRESS NOTES
[facility]  [address], [place]   Phone: [phone]  Fax: [phone]        PROGRESS NOTE      HISTORY OF PRESENT ILLNESS:  The patient is a 64 y.o. female and was seen today for follow up of low back pain that intermittently radiates into her BLE in the S1 distribution. Previously seen for pain and paraesthesias in the BLE (RLE>LLE) to the ankles. Her paraesthesias is worse in the distal aspect of the BLE. Previously seen for right sided low back pain that radiates posteriorly down to her calf with intermittent radiation to her foot x few months. Previously, she was seen for right sided low back pain that radiates posteriorly down to her calf with intermittent radiation to her foot x a few months. Previously, she was seen for left-sided lower back pain radiating into the LLE in a S1 distribution to the ankle. Previously, she was seen for progressive, intermittent low back pain into the LLE radiating to the ankle x 8/2019 without trauma. Her pain is exacerbated with walking. Positive shopping cart sign. She has completed MDP without benefit. She is currently on Cymbalta 90 mg secondary to depression. Patient failed NEURONTIN 800 mg TID secondary to no relief. Pt underwent left L5 and S1 SNRB on 5/28/2020 with good relief. Pt underwent left-sided L5 and S1 SNRB on 1/21/2021 with relief. She underwent a left L5-S1 SNRB on 6/3/2021 with benefit, her left side was doing well. Patient underwent B/L L5, B/L S1 SNRB 3/10/2022 with benefit. Pt denies change in bowel or bladder habits. Pt denies fever, weight loss, or skin changes. Pt denies any signs of weakness. Patient denies previous spinal surgery or physical therapy/chiropractic care. Pt denies h/o chemotherapy, gastric bypass, alcohol abuse, or DM. PmHx of depression. Note from Dr. Kristi Paz dated 1/8/20 indicating patient was seen with c/o pain and burning in the left leg.  Indicated patient was on Cymbalta 60 mg and Neurontin 300 mg BID at that time. Note from Dr. Neil Still dated 2/19/2020 indicating patient reports no change since last visit. Indicated she endorsed better benefit with the qhs dose of Neurontin. Indicated they increased her Neurontin to 600 mg TID and started her on a MDP at that time. Note from Dr. Ankur Ambrocio dated 7/9/2020 indicating patient was seen with c/o right shoulder pain. Her pain is worse with overhead activity and at night. Indicated she has OA of the right shoulder. L Spine MRI dated 2/10/2020 films reviewed. Per report, grade 1 anterolistheses at L4-5 and L5-S1. Mild spinal canal stenosis at L2-3. Moderate spinal canal stenoses from L3-4 to L5-S1. Mild foraminal stenoses at L3-4 and L4-5. Moderate to severe bilateral foraminal stenoses at L5-S1 with potential L5 nerve root compression. Complex left renal cyst with fluid-hemorrhage level. Other small simple renal cysts. LLE EMG dated 1/17/2020 suggested: sensorimotor peripheral neuropathy and chronic L5 and S1 radiculopathy. A BLE EMG dated 7/26/2021 by Dr. Neil Still was suggestive of Sensorimotor peripheral neuropathy, which appears to be unchanged from her last EMG of 1/2021. Chronic L5 and S1 radiculopathy bilaterally. At her last clinical appointment, pt wished continue current course of treatment. I refilled her Lyrica 300 mg BID. The patient returns today with low back pain radiating into the LLE in a S1 distribution to the knee, occasionally to the ankle. Right sided sxs resolved. She rates her pain 0-8/10, previously 0-3/10. Her pain is aggravated with standing and walking, alleviated with sitting. The pt reports her pain is overall more intense in nature, increase in pain has been gradual..She is complaint with her Lyrica 300 mg BID and continues taking Cymbalta 90 mg daily. Pt denies change in bowel or bladder habits.  reviewed. There is no height or weight on file to calculate BMI.     PCP: Severiano Vasquez MD      Past Medical History:   Diagnosis Date    Allergic rhinitis     on allergy shots    Anemia     Asthma     Borderline diabetic     Cataract     Chronic lung disease     Cigarette smoker     abuse quit     COPD (chronic obstructive pulmonary disease) ([facility])     Degenerative arthritis     both shoulders    Depression     Easy bruising     Hypertension     Nervousness     Osteoporosis 10/27/99    Personal history of smoking 9/10/2020    Pharyngoesophageal dysphagia 9/10/2020        Social History     Socioeconomic History    Marital status:      Spouse name: Not on file    Number of children: Not on file    Years of education: Not on file    Highest education level: Not on file   Occupational History    Not on file   Tobacco Use    Smoking status: Former     Packs/day: 2.50     Years: 45.00     Pack years: 112.50     Types: Cigarettes     Start date: 9/10/1959     Quit date: 2006     Years since quittin.6    Smokeless tobacco: Never    Tobacco comments:     smoked for 50 years   Vaping Use    Vaping Use: Never used   Substance and Sexual Activity    Alcohol use: Not Currently     Alcohol/week: 2.0 - 3.0 standard drinks     Types: 2 - 3 Standard drinks or equivalent per week     Comment: occ    Drug use: No    Sexual activity: Not Currently   Other Topics Concern    Not on file   Social History Narrative    Not on file     Social Determinants of Health     Financial Resource Strain: Not on file   Food Insecurity: Not on file   Transportation Needs: Not on file   Physical Activity: Not on file   Stress: Not on file   Social Connections: Not on file   Intimate Partner Violence: Not on file   Housing Stability: Not on file       Current Outpatient Medications   Medication Sig Dispense Refill    pregabalin (LYRICA) 300 mg capsule Take 1 Capsule by mouth two (2) times a day.  Max Daily Amount: 600 mg. 180 Capsule 0    tiotropium bromide (Spiriva Respimat) 2.5 mcg/actuation inhaler 2 puffs      budesonide (PULMICORT) 0.5 mg/2 mL nbsp       atorvastatin (LIPITOR) 20 mg tablet       umeclidinium (Incruse Ellipta) 62.5 mcg/actuation inhaler Take 1 Puff by inhalation daily. 1 Inhaler 3    arformoteroL (Brovana) 15 mcg/2 mL nebu neb solution INHALE TWO MILLILITERS (ONE VIAL) BY MOUTH TWICE A DAY 60 Vial 3    naproxen (Naprosyn) 500 mg tablet 1 tablet by mouth every 8-12 hours as needed for shoulder pain (Patient not taking: Reported on 9/1/2021) 20 Tab 0    buPROPion SR (WELLBUTRIN SR) 100 mg SR tablet       albuterol (Ventolin HFA) 90 mcg/actuation inhaler Take 2 Puffs by inhalation every four (4) hours as needed for Wheezing. 1 Inhaler 2    doxycycline (VIBRAMYCIN) 100 mg capsule Take 1 Cap by mouth two (2) times a day. 20 Cap 0    albuterol sulfate (PROVENTIL;VENTOLIN) 2.5 mg/0.5 mL nebu nebulizer solution 2.5 mg by Nebulization route every four (4) hours as needed for Wheezing or Shortness of Breath. [employer]      gabapentin (Neurontin) 400 mg capsule Take 1 cap tid x 1 week, then 1 cap bid x 1 week, then 1 cap every day x 1 week and then stop (Patient not taking: Reported on 6/17/2021) 42 Cap 0    meloxicam (MOBIC) 15 mg tablet Take 15 mg by mouth as needed. DULoxetine (CYMBALTA) 20 mg capsule Take 90 mg by mouth daily. pravastatin (PRAVACHOL) 20 mg tablet  (Patient not taking: Reported on 6/17/2021)      gabapentin (NEURONTIN) 100 mg capsule 600 mg. (Patient not taking: Reported on 9/9/2021)      cyclobenzaprine (FLEXERIL) 5 mg tablet Take 5 mg by mouth three (3) times daily as needed for Muscle Spasm(s). lisinopril (PRINIVIL, ZESTRIL) 10 mg tablet TAKE 1 TABLET BY MOUTH DAILY 30 Tab 0    sertraline (ZOLOFT) 100 mg tablet TAKE 1 TABLET BY MOUTH EVERY DAY 30 Tab 6    diazepam (VALIUM) 5 mg tablet Take 1 Tab by mouth two (2) times daily as needed for Anxiety.  60 Tab 2       Allergies   Allergen Reactions    Qvar [Beclomethasone Dipropionate] Shortness of Breath    Entex [Phenylephrine-Guaifenesin] Other (comments)     intolerance    Sulfa (Sulfonamide Antibiotics) Other (comments)          REVIEW OF SYSTEMS  Constitutional symptoms: Negative  Eyes: Negative  Ears, Nose, Throat, and Mouth: Negative  Cardiovascular: Negative  Respiratory: Negative  Genitourinary: Negative  Integumentary (Skin and/or breast): Negative  Musculoskeletal: Positive for low back pain radiating into the LLE in a S1 distribution to the knee, occasionally to the ankle  Extremities: Negative for edema. Endocrine/Rheumatologic: Negative  Hematologic/Lymphatic: Negative  Allergic/Immunologic: Negative  Psychiatric: Negative     PHYSICAL EXAMINATION    Visit Vitals  LMP 01/01/1974       CONSTITUTIONAL: NAD, A&O x 3  SENSATION: Decreased sensation to light touch on the RLE in a L$ distribution. Otherwise, intact to light touch throughout  RANGE OF MOTION: The patient has full passive range of motion in all four extremities. MOTOR:  Straight Leg Raise: Negative, bilateral               Hip Flex Knee Ext Knee Flex Ankle DF GTE Ankle PF Tone   Right +4/5 +4/5 +4/5 +4/5 +4/5 +4/5 +4/5   Left +4/5 +4/5 +4/5 +4/5 +4/5 +4/5 +4/5     RADIOGRAPHS  Lumbar spine plain films dated 8/25/2022. 2 views: AP and lateral. Revealed: Mild scoliosis. Slight anterolisthesis of L4-5. Pan lumbar degenerative disc dsease. ASSESSMENT   Diagnoses and all orders for this visit:    1. Lumbar neuritis    2. Spinal stenosis of lumbar region, unspecified whether neurogenic claudication present    3. DDD (degenerative disc disease), lumbar    4. Lumbosacral spondylosis without myelopathy    5. Spondylolisthesis, acquired      IMPRESSION AND PLAN:  Patient returns to the office today with c/o low back pain radiating into the LLE in a S1 distribution to the knee, occasionally to the ankle. Multiple treatment options were discussed. Patient elected to proceed with blocks.  Prior to this I will order a new open L spine MRI to evaluate for any changes as her last MRI was done about 2 years ago. I advised patient to bring copies of films to next visit. Pt wished to continue on Lyrica. I provided her refills of Lyrica 300 mg BID. Declined PT. Patient is neurologically intact. I will see the patient back following MRI or earlier if needed. Written by Trang Slade, as dictated by Krystal Jennings MD  I examined the patient, reviewed and agree with the note.

## 2022-08-25 ENCOUNTER — OFFICE VISIT (OUTPATIENT)
Dept: ORTHOPEDIC SURGERY | Age: 77
End: 2022-08-25
Payer: MEDICARE

## 2022-08-25 VITALS
BODY MASS INDEX: 30.73 KG/M2 | WEIGHT: 180 LBS | HEIGHT: 64 IN | RESPIRATION RATE: 18 BRPM | TEMPERATURE: 97 F | HEART RATE: 98 BPM

## 2022-08-25 DIAGNOSIS — M54.16 LUMBAR NEURITIS: Primary | ICD-10-CM

## 2022-08-25 DIAGNOSIS — M54.50 LUMBAR PAIN: ICD-10-CM

## 2022-08-25 DIAGNOSIS — M48.061 SPINAL STENOSIS OF LUMBAR REGION, UNSPECIFIED WHETHER NEUROGENIC CLAUDICATION PRESENT: ICD-10-CM

## 2022-08-25 DIAGNOSIS — M47.817 LUMBOSACRAL SPONDYLOSIS WITHOUT MYELOPATHY: ICD-10-CM

## 2022-08-25 DIAGNOSIS — M43.10 SPONDYLOLISTHESIS, ACQUIRED: ICD-10-CM

## 2022-08-25 DIAGNOSIS — M51.36 DDD (DEGENERATIVE DISC DISEASE), LUMBAR: ICD-10-CM

## 2022-08-25 PROCEDURE — 1090F PRES/ABSN URINE INCON ASSESS: CPT | Performed by: PHYSICAL MEDICINE & REHABILITATION

## 2022-08-25 PROCEDURE — G8432 DEP SCR NOT DOC, RNG: HCPCS | Performed by: PHYSICAL MEDICINE & REHABILITATION

## 2022-08-25 PROCEDURE — 99213 OFFICE O/P EST LOW 20 MIN: CPT | Performed by: PHYSICAL MEDICINE & REHABILITATION

## 2022-08-25 PROCEDURE — G8427 DOCREV CUR MEDS BY ELIG CLIN: HCPCS | Performed by: PHYSICAL MEDICINE & REHABILITATION

## 2022-08-25 PROCEDURE — 1101F PT FALLS ASSESS-DOCD LE1/YR: CPT | Performed by: PHYSICAL MEDICINE & REHABILITATION

## 2022-08-25 PROCEDURE — 72100 X-RAY EXAM L-S SPINE 2/3 VWS: CPT | Performed by: PHYSICAL MEDICINE & REHABILITATION

## 2022-08-25 PROCEDURE — 1123F ACP DISCUSS/DSCN MKR DOCD: CPT | Performed by: PHYSICAL MEDICINE & REHABILITATION

## 2022-08-25 PROCEDURE — G8536 NO DOC ELDER MAL SCRN: HCPCS | Performed by: PHYSICAL MEDICINE & REHABILITATION

## 2022-08-25 PROCEDURE — G8417 CALC BMI ABV UP PARAM F/U: HCPCS | Performed by: PHYSICAL MEDICINE & REHABILITATION

## 2022-08-25 RX ORDER — PREGABALIN 300 MG/1
300 CAPSULE ORAL 2 TIMES DAILY
Qty: 180 CAPSULE | Refills: 0 | Status: SHIPPED | OUTPATIENT
Start: 2022-08-25 | End: 2022-09-29 | Stop reason: SDUPTHER

## 2022-08-25 NOTE — LETTER
8/25/2022    Patient: Brianna Patel   YOB: 1945   Date of Visit: 8/25/2022     Marleny Boswell MD  [address] [place] [id number]  Via Fax: [phone]    Dear Marleny Boswell MD,      Thank you for referring Ms. Otto Alexandre to [facility] for evaluation. My notes for this consultation are attached. If you have questions, please do not hesitate to call me. I look forward to following your patient along with you.       Sincerely,    Patrizia Mcgovern MD

## 2022-09-01 DIAGNOSIS — M54.50 LUMBAR PAIN: ICD-10-CM

## 2022-09-01 DIAGNOSIS — M48.061 SPINAL STENOSIS OF LUMBAR REGION, UNSPECIFIED WHETHER NEUROGENIC CLAUDICATION PRESENT: ICD-10-CM

## 2022-09-01 DIAGNOSIS — M54.16 LUMBAR NEURITIS: ICD-10-CM

## 2022-09-12 ENCOUNTER — HOSPITAL ENCOUNTER (OUTPATIENT)
Age: 77
Discharge: HOME | End: 2022-09-12
Attending: PHYSICAL MEDICINE & REHABILITATION
Payer: MEDICARE

## 2022-09-12 PROCEDURE — 72148 MRI LUMBAR SPINE W/O DYE: CPT

## 2022-09-29 ENCOUNTER — OFFICE VISIT (OUTPATIENT)
Dept: ORTHOPEDIC SURGERY | Age: 77
End: 2022-09-29
Payer: MEDICARE

## 2022-09-29 VITALS
BODY MASS INDEX: 31.07 KG/M2 | HEIGHT: 64 IN | HEART RATE: 99 BPM | WEIGHT: 182 LBS | OXYGEN SATURATION: 94 % | RESPIRATION RATE: 16 BRPM | TEMPERATURE: 97.2 F

## 2022-09-29 DIAGNOSIS — M48.061 SPINAL STENOSIS OF LUMBAR REGION, UNSPECIFIED WHETHER NEUROGENIC CLAUDICATION PRESENT: ICD-10-CM

## 2022-09-29 DIAGNOSIS — M43.10 SPONDYLOLISTHESIS, ACQUIRED: ICD-10-CM

## 2022-09-29 DIAGNOSIS — M54.50 LUMBAR PAIN: ICD-10-CM

## 2022-09-29 DIAGNOSIS — M47.817 LUMBOSACRAL SPONDYLOSIS WITHOUT MYELOPATHY: ICD-10-CM

## 2022-09-29 DIAGNOSIS — M51.36 DDD (DEGENERATIVE DISC DISEASE), LUMBAR: ICD-10-CM

## 2022-09-29 DIAGNOSIS — G60.9 IDIOPATHIC PERIPHERAL NEUROPATHY: ICD-10-CM

## 2022-09-29 DIAGNOSIS — M54.16 LUMBAR NEURITIS: Primary | ICD-10-CM

## 2022-09-29 DIAGNOSIS — M19.011 GLENOHUMERAL ARTHRITIS, RIGHT: ICD-10-CM

## 2022-09-29 PROCEDURE — G8417 CALC BMI ABV UP PARAM F/U: HCPCS | Performed by: PHYSICAL MEDICINE & REHABILITATION

## 2022-09-29 PROCEDURE — G8536 NO DOC ELDER MAL SCRN: HCPCS | Performed by: PHYSICAL MEDICINE & REHABILITATION

## 2022-09-29 PROCEDURE — 1090F PRES/ABSN URINE INCON ASSESS: CPT | Performed by: PHYSICAL MEDICINE & REHABILITATION

## 2022-09-29 PROCEDURE — G8432 DEP SCR NOT DOC, RNG: HCPCS | Performed by: PHYSICAL MEDICINE & REHABILITATION

## 2022-09-29 PROCEDURE — 1101F PT FALLS ASSESS-DOCD LE1/YR: CPT | Performed by: PHYSICAL MEDICINE & REHABILITATION

## 2022-09-29 PROCEDURE — 99214 OFFICE O/P EST MOD 30 MIN: CPT | Performed by: PHYSICAL MEDICINE & REHABILITATION

## 2022-09-29 PROCEDURE — G8427 DOCREV CUR MEDS BY ELIG CLIN: HCPCS | Performed by: PHYSICAL MEDICINE & REHABILITATION

## 2022-09-29 PROCEDURE — 1123F ACP DISCUSS/DSCN MKR DOCD: CPT | Performed by: PHYSICAL MEDICINE & REHABILITATION

## 2022-09-29 RX ORDER — PREGABALIN 300 MG/1
300 CAPSULE ORAL 2 TIMES DAILY
Qty: 180 CAPSULE | Refills: 0 | Status: SHIPPED | OUTPATIENT
Start: 2022-09-29

## 2022-09-29 NOTE — LETTER
9/29/2022    Patient: Lizett Sandhu   YOB: 1945   Date of Visit: 9/29/2022     Flakito Woods MD  [address] [place], [place] [id number]  Via Fax: [phone]    Dear Flakito Woods MD,      Thank you for referring Ms. Caitlin Coto to [facility] for evaluation. My notes for this consultation are attached. If you have questions, please do not hesitate to call me. I look forward to following your patient along with you.       Sincerely,    Candelaria Calle MD

## 2022-09-29 NOTE — PROGRESS NOTES
[facility]  [address], [address]  Phone: [phone]  Fax: [phone]        PROGRESS NOTE      HISTORY OF PRESENT ILLNESS:  The patient is a 64 y.o. female and was seen today for follow up of low back pain radiating into the LLE in a S1 distribution to the knee, occasionally to the ankle. Previously seen for low back pain that intermittently radiates into her BLE in the S1 distribution. Previously seen for pain and paraesthesias in the BLE (RLE>LLE) to the ankles. Her paraesthesias is worse in the distal aspect of the BLE. Previously seen for right sided low back pain that radiates posteriorly down to her calf with intermittent radiation to her foot x few months. Previously, she was seen for right sided low back pain that radiates posteriorly down to her calf with intermittent radiation to her foot x a few months. Previously, she was seen for left-sided lower back pain radiating into the LLE in a S1 distribution to the ankle. Previously, she was seen for progressive, intermittent low back pain into the LLE radiating to the ankle x 8/2019 without trauma. Her pain is exacerbated with walking. Positive shopping cart sign. She has completed MDP without benefit. She is currently on Cymbalta 90 mg secondary to depression. Patient failed NEURONTIN 800 mg TID secondary to no relief. Pt underwent left L5 and S1 SNRB on 5/28/2020 with good relief. Pt underwent left-sided L5 and S1 SNRB on 1/21/2021 with relief. She underwent a left L5-S1 SNRB on 6/3/2021 with benefit, her left side was doing well. Patient underwent B/L L5, B/L S1 SNRB 3/10/2022 with benefit. Pt denies change in bowel or bladder habits. Pt denies fever, weight loss, or skin changes. Pt denies any signs of weakness. Patient denies previous spinal surgery or physical therapy/chiropractic care. Pt denies h/o chemotherapy, gastric bypass, alcohol abuse, or DM. PmHx of depression.  Note from Dr. Kristin Mckinley dated 1/8/20 indicating patient was seen with c/o pain and burning in the left leg. Indicated patient was on Cymbalta 60 mg and Neurontin 300 mg BID at that time. Note from Dr. Beau Valle dated 2/19/2020 indicating patient reports no change since last visit. Indicated she endorsed better benefit with the qhs dose of Neurontin. Indicated they increased her Neurontin to 600 mg TID and started her on a MDP at that time. Note from Dr. Lynne Capps dated 7/9/2020 indicating patient was seen with c/o right shoulder pain. Her pain is worse with overhead activity and at night. Indicated she has OA of the right shoulder. L Spine MRI dated 2/10/2020 films reviewed. Per report, grade 1 anterolistheses at L4-5 and L5-S1. Mild spinal canal stenosis at L2-3. Moderate spinal canal stenoses from L3-4 to L5-S1. Mild foraminal stenoses at L3-4 and L4-5. Moderate to severe bilateral foraminal stenoses at L5-S1 with potential L5 nerve root compression. Complex left renal cyst with fluid-hemorrhage level. Other small simple renal cysts. LLE EMG dated 1/17/2020 suggested: sensorimotor peripheral neuropathy and chronic L5 and S1 radiculopathy. A BLE EMG dated 7/26/2021 by Dr. Beau Valle was suggestive of Sensorimotor peripheral neuropathy, which appears to be unchanged from her last EMG of 1/2021. Chronic L5 and S1 radiculopathy bilaterally. At her last clinical appointment, patient elected to proceed with blocks. Prior to this I ordered a new open L spine MRI to evaluate for any changes as her last MRI was done about 2 years ago. I advised patient to bring copies of films to next visit. Pt wished to continue on Lyrica. I provided her refills of Lyrica 300 mg BID. Declined PT. The patient returns today with low back pain radiating into the BLE in a S1 distribution down to the foot (LLE > RLE). She rates her pain 3-7/10, previously 0-8/10. Pain is holding steady and is aggravated by walking and standing.  Pt continues to take Lyrica 300 mg BID Pt denies change in bowel or bladder habits. L spine MRI dated 2022 films independently reviewed. Per report, moderately pronounced multilevel degenerative findings along lumbar spine. Most pronounced at L4-5 with a moderate to severe spinal stenosis. A more moderate stenosis at L2-3. The distribution is similar to prior, with some progression particularly at L2-3. These and other levels as detailed above         reviewed. Body mass index is 31.24 kg/m².     PCP: Claude Randy, MD      Past Medical History:   Diagnosis Date    Allergic rhinitis     on allergy shots    Anemia     Asthma     Borderline diabetic     Cataract     Chronic lung disease     Cigarette smoker     abuse quit     COPD [facility]chronic obstructive [facility] disease) ([facility])     Degenerative arthritis     both shoulders    Depression     Easy bruising     Hypertension     Nervousness     Osteoporosis 10/27/99    Personal history of smoking 9/10/2020    Pharyngoesophageal dysphagia 9/10/2020        Social History     Socioeconomic History    Marital status:      Spouse name: Not on file    Number of children: Not on file    Years of education: Not on file    Highest education level: Not on file   Occupational History    Not on file   Tobacco Use    Smoking status: Former     Packs/day: 2.50     Years: 45.00     Pack years: 112.50     Types: Cigarettes     Start date: 9/10/1959     Quit date: 2006     Years since quittin.7    Smokeless tobacco: Never    Tobacco comments:     smoked for 50 years   Vaping Use    Vaping Use: Never used   Substance and Sexual Activity    Alcohol use: Not Currently     Alcohol/week: 2.0 - 3.0 standard drinks     Types: 2 - 3 Standard drinks or equivalent per week     Comment: occ    Drug use: No    Sexual activity: Not Currently   Other Topics Concern    Not on file   Social History Narrative    Not on file     Social Determinants of Health     Financial Resource Strain: Not on file   Food Insecurity: Not on file   Transportation Needs: Not on file   Physical Activity: Not on file   Stress: Not on file   Social Connections: Not on file   Intimate Partner Violence: Not on file   Housing Stability: Not on file       Current Outpatient Medications   Medication Sig Dispense Refill    pregabalin (LYRICA) 300 mg capsule Take 1 Capsule by mouth two (2) times a day. Max Daily Amount: 600 mg. 180 Capsule 0    tiotropium bromide (SPIRIVA RESPIMAT) 2.5 mcg/actuation inhaler 2 puffs      atorvastatin (LIPITOR) 20 mg tablet       umeclidinium (Incruse Ellipta) 62.5 mcg/actuation inhaler Take 1 Puff by inhalation daily. 1 Inhaler 3    arformoteroL (Brovana) 15 mcg/2 mL nebu neb solution INHALE TWO MILLILITERS (ONE VIAL) BY MOUTH TWICE A DAY 60 Vial 3    albuterol (Ventolin HFA) 90 mcg/actuation inhaler Take 2 Puffs by inhalation every four (4) hours as needed for Wheezing. 1 Inhaler 2    albuterol sulfate (PROVENTIL;VENTOLIN) 2.5 mg/0.5 mL nebu nebulizer solution 2.5 mg by Nebulization route every four (4) hours as needed for Wheezing or Shortness of Breath. [employer]      meloxicam (MOBIC) 15 mg tablet Take 15 mg by mouth as needed. DULoxetine (CYMBALTA) 20 mg capsule Take 90 mg by mouth daily. pravastatin (PRAVACHOL) 20 mg tablet       lisinopril (PRINIVIL, ZESTRIL) 10 mg tablet TAKE 1 TABLET BY MOUTH DAILY 30 Tab 0    diazepam (VALIUM) 5 mg tablet Take 1 Tab by mouth two (2) times daily as needed for Anxiety. 60 Tab 2    budesonide (PULMICORT) 0.5 mg/2 mL nbsp  (Patient not taking: Reported on 9/29/2022)      naproxen (Naprosyn) 500 mg tablet 1 tablet by mouth every 8-12 hours as needed for shoulder pain (Patient not taking: No sig reported) 20 Tab 0    buPROPion SR (WELLBUTRIN SR) 100 mg SR tablet  (Patient not taking: No sig reported)      doxycycline (VIBRAMYCIN) 100 mg capsule Take 1 Cap by mouth two (2) times a day.  (Patient not taking: No sig reported) 20 Cap 0    gabapentin (Neurontin) 400 mg capsule Take 1 cap tid x 1 week, then 1 cap bid x 1 week, then 1 cap every day x 1 week and then stop (Patient not taking: No sig reported) 42 Cap 0    gabapentin (NEURONTIN) 100 mg capsule 600 mg. (Patient not taking: No sig reported)      cyclobenzaprine (FLEXERIL) 5 mg tablet Take 5 mg by mouth three (3) times daily as needed for Muscle Spasm(s). (Patient not taking: No sig reported)      sertraline (ZOLOFT) 100 mg tablet TAKE 1 TABLET BY MOUTH EVERY DAY (Patient not taking: Reported on 9/29/2022) 30 Tab 6       Allergies   Allergen Reactions    Qvar [Beclomethasone Dipropionate] Shortness of Breath    Entex [Phenylephrine-Guaifenesin] Other (comments)     intolerance    Sulfa (Sulfonamide Antibiotics) Other (comments)          REVIEW OF SYSTEMS  Constitutional symptoms: Negative  Eyes: Negative  Ears, Nose, Throat, and Mouth: Negative  Cardiovascular: Negative  Respiratory: Negative  Genitourinary: Negative  Integumentary (Skin and/or breast): Negative  Musculoskeletal: Positive for low back pain radiating into the BLE in a S1 distribution down to the foot (LLE > RLE)  Extremities: Negative for edema. Endocrine/Rheumatologic: Negative  Hematologic/Lymphatic: Negative  Allergic/Immunologic: Negative  Psychiatric: Negative     PHYSICAL EXAMINATION    Visit Vitals  Pulse 99   Temp 97.2 °F (36.2 °C) (Tympanic)   Resp 16   Ht 5' 4\" (1.626 m)   Wt 182 lb (82.6 kg)   LMP 01/01/1974   SpO2 94% Comment: on 2 lO2   BMI 31.24 kg/m²       CONSTITUTIONAL: NAD, A&O x 3  SENSATION: Decreased sensation to light touch on the LLE in a L4 distribution. Otherwise, Intact to light touch throughout  RANGE OF MOTION: The patient has full passive range of motion in all four extremities.   MOTOR:  Straight Leg Raise: Negative, bilateral               Hip Flex Knee Ext Knee Flex Ankle DF GTE Ankle PF Tone   Right +4/5 +4/5 +4/5 +4/5 +4/5 +4/5 +4/5   Left +4/5 +4/5 +4/5 +4/5 +4/5 +4/5 +4/5       ASSESSMENT   Diagnoses and all orders for this visit:    1. Lumbar neuritis    2. Spinal stenosis of lumbar region, unspecified whether neurogenic claudication present    3. DDD (degenerative disc disease), lumbar    4. Lumbosacral spondylosis without myelopathy    5. Spondylolisthesis, acquired    6. Lumbar pain    7. Idiopathic peripheral neuropathy    8. Glenohumeral arthritis, right        IMPRESSION AND PLAN:  Patient returns to the office today with c/o low back pain radiating into the BLE in a S1 distribution down to the foot (LLE > RLE). Multiple treatment options were discussed. I will order refills of Lyrica 300 mg BID. Patient elected to proceed with blocks. I will order an L3-L4 epidural. Patient is neurologically intact. I will see the patient back following block or earlier if needed. Written by Abel Erickson, as dictated by Kennedi Cortés MD  I examined the patient, reviewed and agree with the note.

## 2022-10-27 ENCOUNTER — HOSPITAL ENCOUNTER (OUTPATIENT)
Age: 77
Setting detail: OUTPATIENT SURGERY
Discharge: HOME | End: 2022-10-27
Attending: PHYSICAL MEDICINE & REHABILITATION | Admitting: PHYSICAL MEDICINE & REHABILITATION
Payer: MEDICARE

## 2022-10-27 ENCOUNTER — APPOINTMENT (OUTPATIENT)
Dept: GENERAL RADIOLOGY | Age: 77
End: 2022-10-27
Attending: PHYSICAL MEDICINE & REHABILITATION
Payer: MEDICARE

## 2022-10-27 VITALS
OXYGEN SATURATION: 95 % | SYSTOLIC BLOOD PRESSURE: 143 MMHG | DIASTOLIC BLOOD PRESSURE: 78 MMHG | RESPIRATION RATE: 16 BRPM | TEMPERATURE: 97.9 F | HEART RATE: 91 BPM

## 2022-10-27 PROCEDURE — 74011000250 HC RX REV CODE- 250: Performed by: PHYSICAL MEDICINE & REHABILITATION

## 2022-10-27 PROCEDURE — 74011250637 HC RX REV CODE- 250/637: Performed by: PHYSICAL MEDICINE & REHABILITATION

## 2022-10-27 PROCEDURE — 74011250636 HC RX REV CODE- 250/636: Performed by: PHYSICAL MEDICINE & REHABILITATION

## 2022-10-27 PROCEDURE — 62323 NJX INTERLAMINAR LMBR/SAC: CPT | Performed by: PHYSICAL MEDICINE & REHABILITATION

## 2022-10-27 PROCEDURE — 77030014124 HC TY EPDRL BBMI -A: Performed by: PHYSICAL MEDICINE & REHABILITATION

## 2022-10-27 PROCEDURE — 2709999900 HC NON-CHARGEABLE SUPPLY: Performed by: PHYSICAL MEDICINE & REHABILITATION

## 2022-10-27 PROCEDURE — 76010000009 HC PAIN MGT 0 TO 30 MIN PROC: Performed by: PHYSICAL MEDICINE & REHABILITATION

## 2022-10-27 RX ORDER — DEXAMETHASONE SODIUM PHOSPHATE 100 MG/10ML
INJECTION INTRAMUSCULAR; INTRAVENOUS AS NEEDED
Status: DISCONTINUED | OUTPATIENT
Start: 2022-10-27 | End: 2022-10-27 | Stop reason: HOSPADM

## 2022-10-27 RX ORDER — DIAZEPAM 5 MG/1
5-20 TABLET ORAL ONCE
Status: COMPLETED | OUTPATIENT
Start: 2022-10-27 | End: 2022-10-27

## 2022-10-27 RX ORDER — LIDOCAINE HYDROCHLORIDE 10 MG/ML
INJECTION, SOLUTION EPIDURAL; INFILTRATION; INTRACAUDAL; PERINEURAL AS NEEDED
Status: DISCONTINUED | OUTPATIENT
Start: 2022-10-27 | End: 2022-10-27 | Stop reason: HOSPADM

## 2022-10-27 RX ADMIN — DIAZEPAM 10 MG: 5 TABLET ORAL at 12:03

## 2022-10-27 NOTE — PERIOP NOTES
Patient verbalized understanding of discharge instructions and verbally consented to [facility] Patient Consent. Signature pad not working.

## 2022-10-27 NOTE — PROCEDURES
Intralaminar Epidural Steroid Block Procedure Note      Patient Name: Pedro Dangelo    Date of Procedure: October 27, 2022    Preoperative Diagnosis: Lumbar radiculopathy [M54.16]    Post Operative Diagnosis: Lumbar radiculopathy [M54.16]    Procedure: L3-L4 Epidural Block     PROCEDURE:  Lumbar Epidural Block    Consent:  Informed  Consent was obtained prior to the procedure. The patient was given the opportunity to ask questions regarding the procedure and its associated risks. In addition to the potential risks associated with the procedure itself, the patient was informed both verbally and in writing of potential side effects of the use glucocorticoids. The patient appeared to comprehend the informed consent and desired to have the procedure performed. The patient was placed in the prone position on the fluoroscopy table and the back prepped and draped in the usual sterile manner. The interlaminar space was identified using fluoroscopy and the skin anesthetized with 1% Lidocaine. A #18 Tuohy Epidural needle was advanced under fluoroscopic control from a paramedian approach to the  epidural space as noted above, using the loss of resistance to fluid technique. Then, 10 mg of preservative free Dexamethasone and 2 cc of Lidocaine was slowly injected. The patient tolerated the procedure well. The injection area was cleaned and band aids applied. No excessive bleeding was noted. Patient dressed and was discharged to home with instructions.

## 2022-11-17 ENCOUNTER — OFFICE VISIT (OUTPATIENT)
Dept: ORTHOPEDIC SURGERY | Age: 77
End: 2022-11-17
Payer: MEDICARE

## 2022-11-17 VITALS
HEIGHT: 64 IN | BODY MASS INDEX: 30.73 KG/M2 | TEMPERATURE: 96.7 F | RESPIRATION RATE: 16 BRPM | HEART RATE: 89 BPM | OXYGEN SATURATION: 95 % | WEIGHT: 180 LBS

## 2022-11-17 DIAGNOSIS — M47.817 LUMBOSACRAL SPONDYLOSIS WITHOUT MYELOPATHY: ICD-10-CM

## 2022-11-17 DIAGNOSIS — M54.16 LUMBAR NEURITIS: Primary | ICD-10-CM

## 2022-11-17 DIAGNOSIS — M43.10 SPONDYLOLISTHESIS, ACQUIRED: ICD-10-CM

## 2022-11-17 DIAGNOSIS — M54.50 LUMBAR PAIN: ICD-10-CM

## 2022-11-17 DIAGNOSIS — M51.36 DDD (DEGENERATIVE DISC DISEASE), LUMBAR: ICD-10-CM

## 2022-11-17 DIAGNOSIS — M48.061 SPINAL STENOSIS OF LUMBAR REGION, UNSPECIFIED WHETHER NEUROGENIC CLAUDICATION PRESENT: ICD-10-CM

## 2022-11-17 PROCEDURE — 1090F PRES/ABSN URINE INCON ASSESS: CPT | Performed by: PHYSICAL MEDICINE & REHABILITATION

## 2022-11-17 PROCEDURE — 1123F ACP DISCUSS/DSCN MKR DOCD: CPT | Performed by: PHYSICAL MEDICINE & REHABILITATION

## 2022-11-17 PROCEDURE — G8432 DEP SCR NOT DOC, RNG: HCPCS | Performed by: PHYSICAL MEDICINE & REHABILITATION

## 2022-11-17 PROCEDURE — G8427 DOCREV CUR MEDS BY ELIG CLIN: HCPCS | Performed by: PHYSICAL MEDICINE & REHABILITATION

## 2022-11-17 PROCEDURE — 99213 OFFICE O/P EST LOW 20 MIN: CPT | Performed by: PHYSICAL MEDICINE & REHABILITATION

## 2022-11-17 PROCEDURE — G8417 CALC BMI ABV UP PARAM F/U: HCPCS | Performed by: PHYSICAL MEDICINE & REHABILITATION

## 2022-11-17 PROCEDURE — G8536 NO DOC ELDER MAL SCRN: HCPCS | Performed by: PHYSICAL MEDICINE & REHABILITATION

## 2022-11-17 PROCEDURE — 1101F PT FALLS ASSESS-DOCD LE1/YR: CPT | Performed by: PHYSICAL MEDICINE & REHABILITATION

## 2022-11-17 NOTE — LETTER
11/17/2022    Patient: Timmy Leigh   YOB: 1945   Date of Visit: 11/17/2022     Rhonda Pelayo MD  [address] [id number]  Via Fax: [phone]    Dear Rhonda Pelayo MD,      Thank you for referring Ms. Jordan Rodriguez to [address] [place] [address] [facility] for evaluation. My notes for this consultation are attached. If you have questions, please do not hesitate to call me. I look forward to following your patient along with you.       Sincerely,    Rocky Clark MD

## 2022-11-17 NOTE — PROGRESS NOTES
[facility]  [address]  [facility], [address]  Phone: [phone]  Fax: [phone]        PROGRESS NOTE      HISTORY OF PRESENT ILLNESS:  The patient is a 76 y.o. female and was seen today for follow up of  low back pain radiating into the LLE in a S1 distribution to the knee, occasionally to the ankle. Previously seen for low back pain that intermittently radiates into her BLE in the S1 distribution. Previously seen for pain and paraesthesias in the BLE (RLE>LLE) to the ankles. Her paraesthesias is worse in the distal aspect of the BLE. Previously seen for right sided low back pain that radiates posteriorly down to her calf with intermittent radiation to her foot x few months. Previously, she was seen for right sided low back pain that radiates posteriorly down to her calf with intermittent radiation to her foot x a few months. Previously, she was seen for left-sided lower back pain radiating into the LLE in a S1 distribution to the ankle. Previously, she was seen for progressive, intermittent low back pain into the LLE radiating to the ankle x 8/2019 without trauma. Her pain is exacerbated with walking. Positive shopping cart sign. She has completed MDP without benefit. She is currently on Cymbalta 90 mg secondary to depression. Patient failed NEURONTIN 800 mg TID secondary to no relief. Pt underwent left L5 and S1 SNRB on 5/28/2020 with good relief. Pt underwent left-sided L5 and S1 SNRB on 1/21/2021 with relief. She underwent a left L5-S1 SNRB on 6/3/2021 with benefit, her left side was doing well. Patient underwent B/L L5, B/L S1 SNRB 3/10/2022 with benefit. Pt denies change in bowel or bladder habits. Pt denies fever, weight loss, or skin changes. Pt denies any signs of weakness. Patient denies previous spinal surgery or physical therapy/chiropractic care. Pt denies h/o chemotherapy, gastric bypass, alcohol abuse, or DM. PmHx of depression.  Note from Dr. Purvis Reading dated 1/8/20 indicating patient was seen with c/o pain and burning in the left leg. Indicated patient was on Cymbalta 60 mg and Neurontin 300 mg BID at that time. Note from Dr. Purvis Reading dated 2/19/2020 indicating patient reports no change since last visit. Indicated she endorsed better benefit with the qhs dose of Neurontin. Indicated they increased her Neurontin to 600 mg TID and started her on a MDP at that time. Note from Dr. Beatriz Ayala dated 7/9/2020 indicating patient was seen with c/o right shoulder pain. Her pain is worse with overhead activity and at night. Indicated she has OA of the right shoulder. LLE EMG dated 1/17/2020 suggested: sensorimotor peripheral neuropathy and chronic L5 and S1 radiculopathy. A BLE EMG dated 7/26/2021 by Dr. Purvis Reading was suggestive of Sensorimotor peripheral neuropathy, which appears to be unchanged from her last EMG of 1/2021. Chronic L5 and S1 radiculopathy bilaterally. L spine MRI dated 9/12/2022 films independently reviewed. Per report, moderately pronounced multilevel degenerative findings along lumbar spine. Most pronounced at L4-5 with a moderate to severe spinal stenosis. A more moderate stenosis at L2-3. The distribution is similar to prior, with some progression particularly at L2-3. These and other levels as detailed above. At her last clinical appointment,  I ordered refills of Lyrica 300 mg BID. Patient elected to proceed with blocks. I ordered an L3-L4 epidural.    The patient returns today with pain location and distribution remains unchanged. She rates her pain 4/10, previously 3-7/10. Patient notes she has new RLE pain in the thigh. Patient says we did an L3-L4 epidural dated 10/27/2022 with some relief. Patient says she is 50% better since the epidural. Patient says that the shot on 10/27/2022 provided more relief than the one on 3/10/2022. Patient says that overall her pain is improved when compared to last office visit.  Pt denies change in bowel or bladder habits. Patient say she is taking the Lyrica 300 mg BID. Patient comes in to the office today with 2 liters of nasal oxygen.  reviewed. Lyrica me Body mass index is 30.9 kg/m².     PCP: Mali Tenorio MD      Past Medical History:   Diagnosis Date    Allergic rhinitis     on allergy shots    Anemia     Asthma     Borderline diabetic     Cataract     Chronic lung disease     Cigarette smoker     abuse quit     COPD (chronic obstructive pulmonary disease) ([facility])     Degenerative arthritis     both shoulders    Depression     Easy bruising     Hypertension     Nervousness     Osteoporosis 10/27/99    Personal history of smoking 9/10/2020    Pharyngoesophageal dysphagia 9/10/2020        Social History     Socioeconomic History    Marital status:      Spouse name: Not on file    Number of children: Not on file    Years of education: Not on file    Highest education level: Not on file   Occupational History    Not on file   Tobacco Use    Smoking status: Former     Packs/day: 2.50     Years: 45.00     Pack years: 112.50     Types: Cigarettes     Start date: 9/10/1959     Quit date: 2006     Years since quittin.8    Smokeless tobacco: Never    Tobacco comments:     smoked for 50 years   Vaping Use    Vaping Use: Never used   Substance and Sexual Activity    Alcohol use: Not Currently     Alcohol/week: 2.0 - 3.0 standard drinks     Types: 2 - 3 Standard drinks or equivalent per week     Comment: occ    Drug use: No    Sexual activity: Not Currently   Other Topics Concern    Not on file   Social History Narrative    Not on file     Social Determinants of Health     Financial Resource Strain: Not on file   Food Insecurity: Not on file   Transportation Needs: Not on file   Physical Activity: Not on file   Stress: Not on file   Social Connections: Not on file   Intimate Partner Violence: Not on file   Housing Stability: Not on file       Current Outpatient Medications   Medication Sig Dispense Refill    pregabalin (LYRICA) 300 mg capsule Take 1 Capsule by mouth two (2) times a day. Max Daily Amount: 600 mg. 180 Capsule 0    tiotropium bromide (SPIRIVA RESPIMAT) 2.5 mcg/actuation inhaler 2 puffs      atorvastatin (LIPITOR) 20 mg tablet       umeclidinium (Incruse Ellipta) 62.5 mcg/actuation inhaler Take 1 Puff by inhalation daily. 1 Inhaler 3    arformoteroL (Brovana) 15 mcg/2 mL nebu neb solution INHALE TWO MILLILITERS (ONE VIAL) BY MOUTH TWICE A DAY 60 Vial 3    albuterol (Ventolin HFA) 90 mcg/actuation inhaler Take 2 Puffs by inhalation every four (4) hours as needed for Wheezing. 1 Inhaler 2    albuterol sulfate (PROVENTIL;VENTOLIN) 2.5 mg/0.5 mL nebu nebulizer solution 2.5 mg by Nebulization route every four (4) hours as needed for Wheezing or Shortness of Breath. [employer]      meloxicam (MOBIC) 15 mg tablet Take 15 mg by mouth as needed. DULoxetine (CYMBALTA) 20 mg capsule Take 90 mg by mouth daily. lisinopril (PRINIVIL, ZESTRIL) 10 mg tablet TAKE 1 TABLET BY MOUTH DAILY 30 Tab 0    budesonide (PULMICORT) 0.5 mg/2 mL nbsp  (Patient not taking: Reported on 11/17/2022)      naproxen (Naprosyn) 500 mg tablet 1 tablet by mouth every 8-12 hours as needed for shoulder pain (Patient not taking: Reported on 11/17/2022) 20 Tab 0    buPROPion SR (WELLBUTRIN SR) 100 mg SR tablet  (Patient not taking: Reported on 11/17/2022)      doxycycline (VIBRAMYCIN) 100 mg capsule Take 1 Cap by mouth two (2) times a day. (Patient not taking: No sig reported) 20 Cap 0    gabapentin (Neurontin) 400 mg capsule Take 1 cap tid x 1 week, then 1 cap bid x 1 week, then 1 cap every day x 1 week and then stop (Patient not taking: No sig reported) 42 Cap 0    pravastatin (PRAVACHOL) 20 mg tablet  (Patient not taking: Reported on 11/17/2022)      gabapentin (NEURONTIN) 100 mg capsule 600 mg.  (Patient not taking: No sig reported)      cyclobenzaprine (FLEXERIL) 5 mg tablet Take 5 mg by mouth three (3) times daily as needed for Muscle Spasm(s). (Patient not taking: Reported on 11/17/2022)      sertraline (ZOLOFT) 100 mg tablet TAKE 1 TABLET BY MOUTH EVERY DAY (Patient not taking: No sig reported) 30 Tab 6    diazepam (VALIUM) 5 mg tablet Take 1 Tab by mouth two (2) times daily as needed for Anxiety. (Patient not taking: Reported on 11/17/2022) 60 Tab 2       Allergies   Allergen Reactions    Qvar [Beclomethasone Dipropionate] Shortness of Breath    Entex [Phenylephrine-Guaifenesin] Other (comments)     intolerance    Sulfa (Sulfonamide Antibiotics) Other (comments)          PHYSICAL EXAMINATION    Visit Vitals  Pulse 89   Temp (!) 96.7 °F (35.9 °C) (Temporal)   Resp 16   Ht 5' 4\" (1.626 m)   Wt 180 lb (81.6 kg)   LMP 01/01/1974   SpO2 95%   BMI 30.90 kg/m²       CONSTITUTIONAL: NAD, A&O x 3  SENSATION: Intact to light touch throughout  RANGE OF MOTION: The patient has full passive range of motion in all four extremities. MOTOR:  Straight Leg Raise: Negative, bilateral    Ambulates without assistive device. Hip Flex Knee Ext Knee Flex Ankle DF GTE Ankle PF Tone   Right +4/5 +4/5 +4/5 +4/5 +4/5 +4/5 +4/5   Left +4/5 +4/5 +4/5 +4/5 +4/5 +4/5 +4/5       ASSESSMENT   Diagnoses and all orders for this visit:    1. Lumbar neuritis    2. Spinal stenosis of lumbar region, unspecified whether neurogenic claudication present    3. DDD (degenerative disc disease), lumbar    4. Lumbar pain    5. Spondylolisthesis, acquired    6. Lumbosacral spondylosis without myelopathy        IMPRESSION AND PLAN:  Patient returns to the office today with c/o pain location and distribution remains unchanged. Multiple treatment options were discussed. I advised the patient that she has one more injection until march of 2023. Patient wished to hold off on the injection at this time. I offered to try a different neuropathic medication, pt declined. Patient wished to continue with the Lyrcia 300 mg BID.  Patient did not need a refill at this time. Patient is neurologically intact. I will see the patient back in 3 month's time or earlier if needed. Written by Shanel Stern, as dictated by Rickie Cueto MD  I examined the patient, reviewed and agree with the note.

## 2023-03-21 RX ORDER — PREGABALIN 300 MG/1
CAPSULE ORAL
Qty: 180 CAPSULE | OUTPATIENT
Start: 2023-03-21

## 2023-03-30 ENCOUNTER — TELEPHONE (OUTPATIENT)
Age: 78
End: 2023-03-30

## 2023-03-30 DIAGNOSIS — M48.061 SPINAL STENOSIS, LUMBAR REGION WITHOUT NEUROGENIC CLAUDICATION: ICD-10-CM

## 2023-03-30 DIAGNOSIS — M54.16 RADICULOPATHY, LUMBAR REGION: Primary | ICD-10-CM

## 2023-03-30 RX ORDER — PREGABALIN 300 MG/1
300 CAPSULE ORAL 2 TIMES DAILY
Qty: 60 CAPSULE | Refills: 0 | Status: SHIPPED | OUTPATIENT
Start: 2023-05-03 | End: 2023-06-02

## 2023-04-29 DIAGNOSIS — M54.16 RADICULOPATHY, LUMBAR REGION: ICD-10-CM

## 2023-04-29 DIAGNOSIS — M48.061 SPINAL STENOSIS, LUMBAR REGION WITHOUT NEUROGENIC CLAUDICATION: ICD-10-CM

## 2023-05-01 RX ORDER — PREGABALIN 300 MG/1
CAPSULE ORAL
Qty: 60 CAPSULE | OUTPATIENT
Start: 2023-05-01

## 2023-05-05 ENCOUNTER — TELEPHONE (OUTPATIENT)
Age: 78
End: 2023-05-05

## 2023-05-05 NOTE — TELEPHONE ENCOUNTER
I gave this pt enough medication to get to her next appt and then she cancelled the appt , but it says for illness/hospitalization. Now her appt is June.   See if she can be seen next week by Dr. Ernesto Herrera on a VV

## 2023-05-05 NOTE — TELEPHONE ENCOUNTER
Patients daughter, Mike Casas, was told to contact us to request authorization to fill pregabalin (LYRICA) 300 MG capsule. This was sent to [facility] or [address]

## 2023-05-08 ENCOUNTER — TELEPHONE (OUTPATIENT)
Age: 78
End: 2023-05-08

## 2023-05-08 DIAGNOSIS — M48.061 SPINAL STENOSIS, LUMBAR REGION WITHOUT NEUROGENIC CLAUDICATION: ICD-10-CM

## 2023-05-08 DIAGNOSIS — M54.16 RADICULOPATHY, LUMBAR REGION: ICD-10-CM

## 2023-05-08 RX ORDER — PREGABALIN 300 MG/1
300 CAPSULE ORAL 2 TIMES DAILY
Qty: 60 CAPSULE | Refills: 0 | OUTPATIENT
Start: 2023-05-08 | End: 2023-06-07

## 2023-05-09 DIAGNOSIS — M54.16 RADICULOPATHY, LUMBAR REGION: ICD-10-CM

## 2023-05-09 DIAGNOSIS — M48.061 SPINAL STENOSIS, LUMBAR REGION WITHOUT NEUROGENIC CLAUDICATION: ICD-10-CM

## 2023-05-09 RX ORDER — PREGABALIN 300 MG/1
CAPSULE ORAL
Qty: 180 CAPSULE | OUTPATIENT
Start: 2023-05-09

## 2023-05-10 ENCOUNTER — TELEPHONE (OUTPATIENT)
Age: 78
End: 2023-05-10

## 2023-05-10 ENCOUNTER — TELEMEDICINE (OUTPATIENT)
Age: 78
End: 2023-05-10
Payer: MEDICARE

## 2023-05-10 DIAGNOSIS — M48.062 SPINAL STENOSIS OF LUMBAR REGION WITH NEUROGENIC CLAUDICATION: Primary | ICD-10-CM

## 2023-05-10 DIAGNOSIS — M47.817 LUMBOSACRAL SPONDYLOSIS WITHOUT MYELOPATHY: ICD-10-CM

## 2023-05-10 DIAGNOSIS — M43.16 SPONDYLOLISTHESIS, LUMBAR REGION: ICD-10-CM

## 2023-05-10 DIAGNOSIS — M51.36 DDD (DEGENERATIVE DISC DISEASE), LUMBAR: ICD-10-CM

## 2023-05-10 DIAGNOSIS — M54.16 LUMBAR NEURITIS: ICD-10-CM

## 2023-05-10 PROCEDURE — 99213 OFFICE O/P EST LOW 20 MIN: CPT | Performed by: PHYSICAL MEDICINE & REHABILITATION

## 2023-05-10 PROCEDURE — 1123F ACP DISCUSS/DSCN MKR DOCD: CPT | Performed by: PHYSICAL MEDICINE & REHABILITATION

## 2023-05-10 RX ORDER — PREGABALIN 300 MG/1
300 CAPSULE ORAL 2 TIMES DAILY
Qty: 60 CAPSULE | Refills: 0 | OUTPATIENT
Start: 2023-05-10 | End: 2023-06-09

## 2023-05-10 RX ORDER — PREGABALIN 300 MG/1
300 CAPSULE ORAL 2 TIMES DAILY
Qty: 180 CAPSULE | Refills: 0 | Status: SHIPPED | OUTPATIENT
Start: 2023-05-10 | End: 2023-08-08

## 2023-05-10 ASSESSMENT — PATIENT HEALTH QUESTIONNAIRE - PHQ9
1. LITTLE INTEREST OR PLEASURE IN DOING THINGS: 0
SUM OF ALL RESPONSES TO PHQ QUESTIONS 1-9: 0
2. FEELING DOWN, DEPRESSED OR HOPELESS: 0
SUM OF ALL RESPONSES TO PHQ QUESTIONS 1-9: 0
SUM OF ALL RESPONSES TO PHQ9 QUESTIONS 1 & 2: 0
SUM OF ALL RESPONSES TO PHQ QUESTIONS 1-9: 0
SUM OF ALL RESPONSES TO PHQ QUESTIONS 1-9: 0

## 2023-05-10 NOTE — PROGRESS NOTES
[facility]  [address], [address]  Phone: [phone]  Fax: [phone]        PROGRESS NOTE    CONSENT:  Pursuant to the emergency declaration under the [address] and [facility] 1135 waiver authority and the [employer] Act, this Virtual Visit was conducted, with patient's consent, to reduce the patient's risk of exposure to COVID-19 and provide continuity of care for an established patient. Services were provided through a video synchronous discussion virtually to substitute for in-person appointment. ENCOUNTER (minutes): 13 minutes 33 seconds. HISTORY OF PRESENT ILLNESS:  The patient is a 76 y.o. female. Saundra Clemente is being seen 5/10/2023 by me via [address] at the [facility] office for follow up of low back pain radiating down the BLE(L=R) in a S1 distribution to the knees. Previously seen for low back pain radiating into the LLE in a S1 distribution to the knee,  occasionally to the ankle. Previously seen for low back pain that intermittently radiates into her BLE in the S1 distribution. Previously seen  for pain and paraesthesias in the BLE (RLE>LLE) to the ankles. Her paraesthesias is worse in the distal aspect of the BLE. Previously seen for right sided low back pain that radiates posteriorly down to her calf with intermittent radiation to her  foot x few months. Previously, she was seen for right sided low back pain that radiates posteriorly down to her calf with intermittent radiation to her foot x a few months. Previously, she was seen  for left-sided lower back pain radiating into the LLE in a S1 distribution to the ankle. Previously, she was seen for progressive, intermittent low back pain into the LLE radiating to the ankle x 8/2019 without trauma. Her pain is exacerbated with walking. Positive shopping cart sign. She has completed MDP without benefit.  She

## 2023-06-06 ENCOUNTER — TELEPHONE (OUTPATIENT)
Age: 78
End: 2023-06-06

## 2023-06-06 NOTE — TELEPHONE ENCOUNTER
Patient's daughter called stating the patient would like to move forward with the injections. She is also currently scheduled for 6/8 OV, is that still needed since she was seen two weeks ago. Please advise.

## 2023-07-12 ENCOUNTER — TELEPHONE (OUTPATIENT)
Age: 78
End: 2023-07-12

## 2023-07-12 NOTE — TELEPHONE ENCOUNTER
Patient's daughter, Brien Castillo, called for a refill of Pregabalin for her mother. I made her aware the current rx end date is 8/8, so she does not know if her mother dropped medication or lost some, but she is completely out. Brien Castillo would like a call back to see if her mother is able to get this refill at [phone].

## 2023-07-14 NOTE — TELEPHONE ENCOUNTER
If Farooq Summers calls back please transfer her to [facility] office.

## 2023-07-17 ENCOUNTER — OFFICE VISIT (OUTPATIENT)
Age: 78
End: 2023-07-17
Payer: MEDICARE

## 2023-07-17 VITALS — WEIGHT: 186 LBS | BODY MASS INDEX: 31.76 KG/M2 | HEIGHT: 64 IN

## 2023-07-17 DIAGNOSIS — M19.012 PRIMARY OSTEOARTHRITIS OF LEFT SHOULDER: ICD-10-CM

## 2023-07-17 DIAGNOSIS — M19.011 PRIMARY OSTEOARTHRITIS OF RIGHT SHOULDER: ICD-10-CM

## 2023-07-17 DIAGNOSIS — M25.512 LEFT SHOULDER PAIN, UNSPECIFIED CHRONICITY: Primary | ICD-10-CM

## 2023-07-17 DIAGNOSIS — M25.511 RIGHT SHOULDER PAIN, UNSPECIFIED CHRONICITY: ICD-10-CM

## 2023-07-17 PROCEDURE — 1123F ACP DISCUSS/DSCN MKR DOCD: CPT | Performed by: ORTHOPAEDIC SURGERY

## 2023-07-17 PROCEDURE — 20611 DRAIN/INJ JOINT/BURSA W/US: CPT | Performed by: ORTHOPAEDIC SURGERY

## 2023-07-17 PROCEDURE — 99214 OFFICE O/P EST MOD 30 MIN: CPT | Performed by: ORTHOPAEDIC SURGERY

## 2023-07-17 PROCEDURE — 73030 X-RAY EXAM OF SHOULDER: CPT | Performed by: ORTHOPAEDIC SURGERY

## 2023-07-17 RX ORDER — TRIAMCINOLONE ACETONIDE 40 MG/ML
40 INJECTION, SUSPENSION INTRA-ARTICULAR; INTRAMUSCULAR ONCE
Status: COMPLETED | OUTPATIENT
Start: 2023-07-17 | End: 2023-07-17

## 2023-07-17 RX ADMIN — TRIAMCINOLONE ACETONIDE 40 MG: 40 INJECTION, SUSPENSION INTRA-ARTICULAR; INTRAMUSCULAR at 16:44

## 2023-07-17 NOTE — PROGRESS NOTES
shoulder pain, unspecified chronicity  M25.512 [62070] Shoulder 2V or more      2. Right shoulder pain, unspecified chronicity  M25.511 [32678] Shoulder 2V or more      3. Primary osteoarthritis of right shoulder  M19.011       4. Primary osteoarthritis of left shoulder  M19.012            PLAN:   1. Pt presents today with right shoulder pain due to a distal clavicle fracture. Risk factors include: HTN  2. Yes cortisone injection indicated today   3. No Physical/Occupational Therapy indicated today  4. No diagnostic test indicated today:   5. No durable medical equipment indicated today  6. No referral indicated today   7. No medications indicated today:   8. No Narcotic indicated today    RTC 4 weeks    Scribed by Vicenta Tabor [facility]) as dictated by Marah Wise MD    I, Dr. Marah Wise, confirm that all documentation is accurate.     Marah Wise M.D.   [facility]

## 2023-07-27 ENCOUNTER — OFFICE VISIT (OUTPATIENT)
Age: 78
End: 2023-07-27
Payer: MEDICARE

## 2023-07-27 VITALS
BODY MASS INDEX: 28.68 KG/M2 | OXYGEN SATURATION: 93 % | WEIGHT: 168 LBS | HEART RATE: 82 BPM | SYSTOLIC BLOOD PRESSURE: 125 MMHG | HEIGHT: 64 IN | TEMPERATURE: 97.5 F | DIASTOLIC BLOOD PRESSURE: 66 MMHG

## 2023-07-27 DIAGNOSIS — M51.36 DDD (DEGENERATIVE DISC DISEASE), LUMBAR: ICD-10-CM

## 2023-07-27 DIAGNOSIS — M47.817 LUMBOSACRAL SPONDYLOSIS WITHOUT MYELOPATHY: ICD-10-CM

## 2023-07-27 DIAGNOSIS — M43.16 SPONDYLOLISTHESIS, LUMBAR REGION: ICD-10-CM

## 2023-07-27 DIAGNOSIS — M54.16 LUMBAR NEURITIS: ICD-10-CM

## 2023-07-27 DIAGNOSIS — M48.062 SPINAL STENOSIS OF LUMBAR REGION WITH NEUROGENIC CLAUDICATION: Primary | ICD-10-CM

## 2023-07-27 DIAGNOSIS — M54.16 RADICULOPATHY, LUMBAR REGION: ICD-10-CM

## 2023-07-27 PROCEDURE — 1123F ACP DISCUSS/DSCN MKR DOCD: CPT | Performed by: PHYSICAL MEDICINE & REHABILITATION

## 2023-07-27 PROCEDURE — 99214 OFFICE O/P EST MOD 30 MIN: CPT | Performed by: PHYSICAL MEDICINE & REHABILITATION

## 2023-07-27 RX ORDER — DULOXETIN HYDROCHLORIDE 60 MG/1
1 CAPSULE, DELAYED RELEASE ORAL 2 TIMES DAILY
COMMUNITY
Start: 2023-05-10

## 2023-07-27 RX ORDER — PREGABALIN 300 MG/1
300 CAPSULE ORAL 2 TIMES DAILY
Qty: 180 CAPSULE | Refills: 0 | Status: SHIPPED | OUTPATIENT
Start: 2023-07-27 | End: 2023-10-25

## 2023-07-27 RX ORDER — CELECOXIB 200 MG/1
200 CAPSULE ORAL DAILY
COMMUNITY

## 2023-07-27 NOTE — PROGRESS NOTES
[facility]  [address]  [address]  [place], [place]  Tel: [phone]  Fax: [phone]          PROGRESS NOTE      HISTORY OF PRESENT ILLNESS:  The patient is a 76 y.o. female and was seen today for follow up of low back pain radiating down the BLE(L=R) in a S1 distribution to the knees. Previously seen for low back pain radiating into the LLE in a S1 distribution to the knee,  occasionally to the ankle. Previously seen for low back pain that intermittently radiates into her BLE in the S1 distribution. Previously seen  for pain and paraesthesias in the BLE (RLE>LLE) to the ankles. Her paraesthesias is worse in the distal aspect of the BLE. Previously seen for right sided low back pain that radiates posteriorly down to her calf with intermittent radiation to her  foot x few months. Previously, she was seen for right sided low back pain that radiates posteriorly down to her calf with intermittent radiation to her foot x a few months. Previously, she was seen  for left-sided lower back pain radiating into the LLE in a S1 distribution to the ankle. Previously, she was seen for progressive, intermittent low back pain into the LLE radiating to the ankle x 8/2019 without trauma. Her pain is exacerbated with walking. Positive shopping cart sign. She has completed MDP without benefit. She is currently on  Cymbalta 90 mg secondary to depression. Patient failed NEURONTIN  800 mg TID secondary to no relief. Pt underwent left L5 and S1 SNRB on 5/28/2020 with good relief. Pt underwent left-sided L5 and S1 SNRB on 1/21/2021 with relief. She underwent a left L5-S1 SNRB on 6/3/2021 with benefit, her left side was  doing well. Patient underwent B/L L5, B/L S1 SNRB 3/10/2022 with benefit. Patient says we did an L3-L4 epidural dated 10/27/2022 with some relief. Patient  says she is 50% better since the epidural. Pt denies change in bowel or bladder habits.  Pt denies fever, weight

## 2023-09-29 ENCOUNTER — TELEPHONE (OUTPATIENT)
Age: 78
End: 2023-09-29

## 2023-09-29 NOTE — TELEPHONE ENCOUNTER
Erma Cadena was contacted and advised appt is needed for new order and patient has been scheduled for 11/9 @ [facility]. Prescription for Lyrica is due to run out 10/25, are we able to call another prescription to last patient until seen again? Erma Cadena is asking if we can confirm this with her, [phone].

## 2023-09-29 NOTE — TELEPHONE ENCOUNTER
Patient's daughter Aide Loyd) called to get her injection scheduled again being that her insurance is back in network. Please contact her daughter Beverleymirela Camargo at [phone].

## 2023-11-03 ENCOUNTER — OFFICE VISIT (OUTPATIENT)
Age: 78
End: 2023-11-03

## 2023-11-03 VITALS — HEIGHT: 64 IN | BODY MASS INDEX: 28.84 KG/M2

## 2023-11-03 DIAGNOSIS — R10.31 RIGHT GROIN PAIN: ICD-10-CM

## 2023-11-03 DIAGNOSIS — I73.9 PAD (PERIPHERAL ARTERY DISEASE) (HCC): ICD-10-CM

## 2023-11-03 DIAGNOSIS — M17.11 PATELLOFEMORAL ARTHRITIS OF RIGHT KNEE: ICD-10-CM

## 2023-11-03 DIAGNOSIS — M25.561 CHRONIC PAIN OF RIGHT KNEE: Primary | ICD-10-CM

## 2023-11-03 DIAGNOSIS — M70.50 PES ANSERINE BURSITIS: ICD-10-CM

## 2023-11-03 DIAGNOSIS — M76.891 ENTHESOPATHY OF RIGHT HIP: ICD-10-CM

## 2023-11-03 DIAGNOSIS — M47.816 PRIMARY OSTEOARTHRITIS OF LUMBAR SPINE: ICD-10-CM

## 2023-11-03 DIAGNOSIS — M17.11 PRIMARY OSTEOARTHRITIS OF RIGHT KNEE: ICD-10-CM

## 2023-11-03 DIAGNOSIS — G89.29 CHRONIC PAIN OF RIGHT KNEE: Primary | ICD-10-CM

## 2023-11-03 DIAGNOSIS — M16.11 PRIMARY OSTEOARTHRITIS OF RIGHT HIP: ICD-10-CM

## 2023-11-03 DIAGNOSIS — M25.551 CHRONIC RIGHT HIP PAIN: ICD-10-CM

## 2023-11-03 DIAGNOSIS — Z91.81 HISTORY OF FALL: ICD-10-CM

## 2023-11-03 DIAGNOSIS — G89.29 CHRONIC RIGHT HIP PAIN: ICD-10-CM

## 2023-11-03 RX ORDER — BETAMETHASONE SODIUM PHOSPHATE AND BETAMETHASONE ACETATE 3; 3 MG/ML; MG/ML
6 INJECTION, SUSPENSION INTRA-ARTICULAR; INTRALESIONAL; INTRAMUSCULAR; SOFT TISSUE ONCE
Status: COMPLETED | OUTPATIENT
Start: 2023-11-03 | End: 2023-11-03

## 2023-11-03 RX ADMIN — BETAMETHASONE SODIUM PHOSPHATE AND BETAMETHASONE ACETATE 6 MG: 3; 3 INJECTION, SUSPENSION INTRA-ARTICULAR; INTRALESIONAL; INTRAMUSCULAR; SOFT TISSUE at 11:14

## 2023-11-03 RX ADMIN — BETAMETHASONE SODIUM PHOSPHATE AND BETAMETHASONE ACETATE 6 MG: 3; 3 INJECTION, SUSPENSION INTRA-ARTICULAR; INTRALESIONAL; INTRAMUSCULAR; SOFT TISSUE at 11:13

## 2023-11-09 ENCOUNTER — OFFICE VISIT (OUTPATIENT)
Age: 78
End: 2023-11-09
Payer: MEDICARE

## 2023-11-09 VITALS — BODY MASS INDEX: 28.84 KG/M2 | HEIGHT: 64 IN

## 2023-11-09 DIAGNOSIS — M51.36 DDD (DEGENERATIVE DISC DISEASE), LUMBAR: Primary | ICD-10-CM

## 2023-11-09 DIAGNOSIS — M48.062 SPINAL STENOSIS OF LUMBAR REGION WITH NEUROGENIC CLAUDICATION: ICD-10-CM

## 2023-11-09 DIAGNOSIS — M54.16 LUMBAR RADICULOPATHY: ICD-10-CM

## 2023-11-09 DIAGNOSIS — M43.16 SPONDYLOLISTHESIS, LUMBAR REGION: ICD-10-CM

## 2023-11-09 DIAGNOSIS — M54.16 LUMBAR NEURITIS: ICD-10-CM

## 2023-11-09 PROCEDURE — 99214 OFFICE O/P EST MOD 30 MIN: CPT | Performed by: PHYSICAL MEDICINE & REHABILITATION

## 2023-11-09 PROCEDURE — 1123F ACP DISCUSS/DSCN MKR DOCD: CPT | Performed by: PHYSICAL MEDICINE & REHABILITATION

## 2023-11-09 RX ORDER — PREGABALIN 300 MG/1
300 CAPSULE ORAL 2 TIMES DAILY
Qty: 180 CAPSULE | Refills: 0 | Status: SHIPPED | OUTPATIENT
Start: 2023-11-09 | End: 2024-02-07

## 2023-11-27 ENCOUNTER — OFFICE VISIT (OUTPATIENT)
Age: 78
End: 2023-11-27
Payer: MEDICARE

## 2023-11-27 VITALS — BODY MASS INDEX: 28.68 KG/M2 | HEIGHT: 64 IN | WEIGHT: 168 LBS

## 2023-11-27 DIAGNOSIS — M25.512 LEFT SHOULDER PAIN, UNSPECIFIED CHRONICITY: Primary | ICD-10-CM

## 2023-11-27 DIAGNOSIS — M19.011 PRIMARY OSTEOARTHRITIS OF RIGHT SHOULDER: ICD-10-CM

## 2023-11-27 DIAGNOSIS — M19.012 PRIMARY OSTEOARTHRITIS OF LEFT SHOULDER: ICD-10-CM

## 2023-11-27 DIAGNOSIS — M25.511 RIGHT SHOULDER PAIN, UNSPECIFIED CHRONICITY: ICD-10-CM

## 2023-11-27 PROCEDURE — 20611 DRAIN/INJ JOINT/BURSA W/US: CPT | Performed by: ORTHOPAEDIC SURGERY

## 2023-11-27 PROCEDURE — 99214 OFFICE O/P EST MOD 30 MIN: CPT | Performed by: ORTHOPAEDIC SURGERY

## 2023-11-27 PROCEDURE — 1123F ACP DISCUSS/DSCN MKR DOCD: CPT | Performed by: ORTHOPAEDIC SURGERY

## 2023-11-27 RX ORDER — TRIAMCINOLONE ACETONIDE 40 MG/ML
40 INJECTION, SUSPENSION INTRA-ARTICULAR; INTRAMUSCULAR ONCE
Status: COMPLETED | OUTPATIENT
Start: 2023-11-27 | End: 2023-11-27

## 2023-11-27 RX ADMIN — TRIAMCINOLONE ACETONIDE 40 MG: 40 INJECTION, SUSPENSION INTRA-ARTICULAR; INTRAMUSCULAR at 16:04

## 2023-12-07 ENCOUNTER — HOSPITAL ENCOUNTER (OUTPATIENT)
Facility: HOSPITAL | Age: 78
Discharge: HOME | End: 2023-12-07
Attending: ORTHOPAEDIC SURGERY
Payer: MEDICARE

## 2023-12-07 DIAGNOSIS — G89.29 CHRONIC RIGHT HIP PAIN: ICD-10-CM

## 2023-12-07 DIAGNOSIS — M16.11 PRIMARY OSTEOARTHRITIS OF RIGHT HIP: ICD-10-CM

## 2023-12-07 DIAGNOSIS — M25.551 CHRONIC RIGHT HIP PAIN: ICD-10-CM

## 2023-12-07 PROCEDURE — 6360000002 HC RX W HCPCS: Performed by: ORTHOPAEDIC SURGERY

## 2023-12-07 PROCEDURE — 20610 DRAIN/INJ JOINT/BURSA W/O US: CPT

## 2023-12-07 PROCEDURE — 6360000004 HC RX CONTRAST MEDICATION: Performed by: ORTHOPAEDIC SURGERY

## 2023-12-07 PROCEDURE — 2500000003 HC RX 250 WO HCPCS: Performed by: ORTHOPAEDIC SURGERY

## 2023-12-07 PROCEDURE — 77002 NEEDLE LOCALIZATION BY XRAY: CPT

## 2023-12-07 RX ORDER — LIDOCAINE HYDROCHLORIDE 10 MG/ML
10 INJECTION, SOLUTION EPIDURAL; INFILTRATION; INTRACAUDAL; PERINEURAL ONCE
OUTPATIENT
Start: 2023-12-07 | End: 2023-12-07

## 2023-12-07 RX ORDER — TRIAMCINOLONE ACETONIDE 40 MG/ML
40 INJECTION, SUSPENSION INTRA-ARTICULAR; INTRAMUSCULAR ONCE
OUTPATIENT
Start: 2023-12-07 | End: 2023-12-07

## 2023-12-07 RX ORDER — IOPAMIDOL 408 MG/ML
10 INJECTION, SOLUTION INTRATHECAL
Status: COMPLETED | OUTPATIENT
Start: 2023-12-07 | End: 2023-12-07

## 2023-12-07 RX ORDER — IOPAMIDOL 408 MG/ML
10 INJECTION, SOLUTION INTRATHECAL ONCE
OUTPATIENT
Start: 2023-12-07 | End: 2023-12-07

## 2023-12-07 RX ORDER — TRIAMCINOLONE ACETONIDE 40 MG/ML
40 INJECTION, SUSPENSION INTRA-ARTICULAR; INTRAMUSCULAR ONCE
Status: COMPLETED | OUTPATIENT
Start: 2023-12-07 | End: 2023-12-07

## 2023-12-07 RX ORDER — LIDOCAINE HYDROCHLORIDE 10 MG/ML
10 INJECTION, SOLUTION EPIDURAL; INFILTRATION; INTRACAUDAL; PERINEURAL ONCE
Status: COMPLETED | OUTPATIENT
Start: 2023-12-07 | End: 2023-12-07

## 2023-12-07 RX ADMIN — TRIAMCINOLONE ACETONIDE 40 MG: 40 INJECTION, SUSPENSION INTRA-ARTICULAR; INTRAMUSCULAR at 11:15

## 2023-12-07 RX ADMIN — LIDOCAINE HYDROCHLORIDE 10 ML: 10 INJECTION, SOLUTION EPIDURAL; INFILTRATION; INTRACAUDAL; PERINEURAL at 11:15

## 2023-12-07 RX ADMIN — IOPAMIDOL 10 ML: 408 INJECTION, SOLUTION INTRATHECAL at 11:15

## 2023-12-14 ENCOUNTER — HOSPITAL ENCOUNTER (OUTPATIENT)
Facility: HOSPITAL | Age: 78
Discharge: HOME | End: 2023-12-14
Payer: MEDICARE

## 2023-12-14 ENCOUNTER — HOSPITAL ENCOUNTER (OUTPATIENT)
Facility: HOSPITAL | Age: 78
End: 2023-12-14
Payer: MEDICARE

## 2023-12-14 VITALS
TEMPERATURE: 98 F | SYSTOLIC BLOOD PRESSURE: 125 MMHG | DIASTOLIC BLOOD PRESSURE: 77 MMHG | OXYGEN SATURATION: 93 % | HEART RATE: 89 BPM | RESPIRATION RATE: 16 BRPM

## 2023-12-14 PROBLEM — M48.062 SPINAL STENOSIS, LUMBAR REGION, WITH NEUROGENIC CLAUDICATION: Status: ACTIVE | Noted: 2023-12-14

## 2023-12-14 PROCEDURE — 6360000002 HC RX W HCPCS: Performed by: PHYSICAL MEDICINE & REHABILITATION

## 2023-12-14 PROCEDURE — 6370000000 HC RX 637 (ALT 250 FOR IP): Performed by: PHYSICAL MEDICINE & REHABILITATION

## 2023-12-14 PROCEDURE — 2500000003 HC RX 250 WO HCPCS: Performed by: PHYSICAL MEDICINE & REHABILITATION

## 2023-12-14 PROCEDURE — 62323 NJX INTERLAMINAR LMBR/SAC: CPT | Performed by: PHYSICAL MEDICINE & REHABILITATION

## 2023-12-14 PROCEDURE — 62323 NJX INTERLAMINAR LMBR/SAC: CPT

## 2023-12-14 RX ORDER — BUDESONIDE 1 MG/2ML
INHALANT ORAL
COMMUNITY
Start: 2023-11-28

## 2023-12-14 RX ORDER — DEXAMETHASONE SODIUM PHOSPHATE 10 MG/ML
10 INJECTION, SOLUTION INTRAMUSCULAR; INTRAVENOUS ONCE
Status: COMPLETED | OUTPATIENT
Start: 2023-12-14 | End: 2023-12-14

## 2023-12-14 RX ORDER — DIAZEPAM 5 MG/1
5 TABLET ORAL ONCE
Status: COMPLETED | OUTPATIENT
Start: 2023-12-14 | End: 2023-12-14

## 2023-12-14 RX ORDER — DIAZEPAM 5 MG/1
2.5 TABLET ORAL ONCE
Status: COMPLETED | OUTPATIENT
Start: 2023-12-14 | End: 2023-12-14

## 2023-12-14 RX ORDER — LIDOCAINE HYDROCHLORIDE 10 MG/ML
30 INJECTION, SOLUTION EPIDURAL; INFILTRATION; INTRACAUDAL; PERINEURAL ONCE
Status: COMPLETED | OUTPATIENT
Start: 2023-12-14 | End: 2023-12-14

## 2023-12-14 RX ORDER — DIAZEPAM 5 MG/1
10 TABLET ORAL ONCE
Status: COMPLETED | OUTPATIENT
Start: 2023-12-14 | End: 2023-12-14

## 2023-12-14 RX ADMIN — DIAZEPAM 5 MG: 5 TABLET ORAL at 13:48

## 2023-12-14 RX ADMIN — LIDOCAINE HYDROCHLORIDE 12 ML: 10 INJECTION, SOLUTION EPIDURAL; INFILTRATION; INTRACAUDAL; PERINEURAL at 14:09

## 2023-12-14 RX ADMIN — DEXAMETHASONE SODIUM PHOSPHATE 10 MG: 10 INJECTION, SOLUTION INTRAMUSCULAR; INTRAVENOUS at 14:11

## 2023-12-14 ASSESSMENT — PAIN DESCRIPTION - DESCRIPTORS: DESCRIPTORS: SHARP

## 2023-12-14 ASSESSMENT — PAIN - FUNCTIONAL ASSESSMENT: PAIN_FUNCTIONAL_ASSESSMENT: 0-10

## 2023-12-14 ASSESSMENT — PAIN SCALES - GENERAL: PAINLEVEL_OUTOF10: 0

## 2023-12-14 NOTE — OP NOTE
Intralaminar Epidural Steroid Block Procedure Note      Patient Name: Ashley Mistry    Date of Procedure: December 14, 2023    Preoperative Diagnosis: M48.062    Post Operative Diagnosis: same  Procedure: L5-S1 Epidural Block     PROCEDURE:  Lumbar Epidural Block    Consent:  Informed  Consent was obtained prior to the procedure. The patient was given the opportunity to ask questions regarding the procedure and its associated risks. In addition to the potential risks associated with the procedure itself, the patient was informed both verbally and in writing of potential side effects of the use glucocorticoids. The patient appeared to comprehend the informed consent and desired to have the procedure performed. The patient was placed in the prone position on the fluoroscopy table and the back prepped and draped in the usual sterile manner. The interlaminar space was identified using fluoroscopy and the skin anesthetized with 1% Lidocaine. A #18 Tuohy Epidural needle was advanced under fluoroscopic control from a paramedian approach to the  epidural space as noted above, using the loss of resistance to fluid technique. Then, 10 mg of preservative free Dexamethasone and 2 cc of Lidocaine was slowly injected. The patient tolerated the procedure well. The injection area was cleaned and band aids applied. No excessive bleeding was noted. Patient dressed and was discharged to home with instructions.

## 2023-12-14 NOTE — PERIOP NOTE
Contacted Pt as to if she would be at her appt. She said that she would be and that she was unaware that she was to arrive an hour early. I advised her that there may be a slight delay as she is not her an hour prior to her appt. But that she will be able to be seen today. She thanked me and voiced understanding.

## 2024-02-02 ENCOUNTER — OFFICE VISIT (OUTPATIENT)
Age: 79
End: 2024-02-02
Payer: MEDICARE

## 2024-02-02 VITALS — WEIGHT: 168 LBS | HEIGHT: 64 IN | BODY MASS INDEX: 28.68 KG/M2

## 2024-02-02 DIAGNOSIS — G89.29 CHRONIC PAIN OF RIGHT KNEE: ICD-10-CM

## 2024-02-02 DIAGNOSIS — M16.11 PRIMARY OSTEOARTHRITIS OF RIGHT HIP: Primary | ICD-10-CM

## 2024-02-02 DIAGNOSIS — M81.0 OSTEOPOROSIS, UNSPECIFIED OSTEOPOROSIS TYPE, UNSPECIFIED PATHOLOGICAL FRACTURE PRESENCE: ICD-10-CM

## 2024-02-02 DIAGNOSIS — I73.9 PAD (PERIPHERAL ARTERY DISEASE) (HCC): ICD-10-CM

## 2024-02-02 DIAGNOSIS — M17.11 PATELLOFEMORAL ARTHRITIS OF RIGHT KNEE: ICD-10-CM

## 2024-02-02 DIAGNOSIS — G89.29 CHRONIC PAIN OF RIGHT HIP: ICD-10-CM

## 2024-02-02 DIAGNOSIS — M76.891 ENTHESOPATHY OF RIGHT HIP: ICD-10-CM

## 2024-02-02 DIAGNOSIS — M25.551 CHRONIC PAIN OF RIGHT HIP: ICD-10-CM

## 2024-02-02 DIAGNOSIS — M47.816 PRIMARY OSTEOARTHRITIS OF LUMBAR SPINE: ICD-10-CM

## 2024-02-02 DIAGNOSIS — M17.11 PRIMARY OSTEOARTHRITIS OF RIGHT KNEE: ICD-10-CM

## 2024-02-02 DIAGNOSIS — M25.561 CHRONIC PAIN OF RIGHT KNEE: ICD-10-CM

## 2024-02-02 PROCEDURE — 99214 OFFICE O/P EST MOD 30 MIN: CPT | Performed by: ORTHOPAEDIC SURGERY

## 2024-02-02 PROCEDURE — 1123F ACP DISCUSS/DSCN MKR DOCD: CPT | Performed by: ORTHOPAEDIC SURGERY

## 2024-02-02 NOTE — PROGRESS NOTES
Patient: Karyn Thomas                MRN: [id number]       SSN:   YOB: 1945        AGE: 78 y.o.        SEX: female  Body mass index is 28.84 kg/m².    PCP: Vicky Watkins MD  02/02/24    Karyn is here today for reevaluation of right hip and right knee pain she is got severe arthritis involving the right hip and the intra-articular injection was not very helpful when I rotate her hip it actually hurts her little bit more in the knee she is slightly short of breath on exertion she does use oxygen full-time and review of her x-rays does confirm likely some osteoporosis I would like her to have a DEXA scan would also like her to have an MRI of the right hip just to make sure were not dealing with with a insufficiency fracture or AVN.    They are very happy with the game plan and we did discuss social determinants of health they do having somewhat limited ambulation and mobility I like her to have a cane for the left hand which we will arrange for her and working to see her back after these investigations                  I have personally reviewed the 4-view x-rays of the right knee and the AP pelvis x-ray, obtained 1/03/23. Shows advanced to severe arthritis of the right hip, with chronic enthesopathy right hip, and significant to severe arthritis of the lumbar spine. Shows moderate arthritis of the right knee, mild arterial calcifications, and significant patellofemoral arthritis of the right knee.     REVIEW OF SYSTEMS:      CON: negative  EYE: negative   ENT: negative  RESP: negative  GI:    negative   :  negative  MSK: Positive  A twelve point review of systems was completed, positives noted and all other systems were reviewed and are negative           Past Medical History:   Diagnosis Date    Allergic rhinitis     on allergy shots    Anemia     Asthma     Borderline diabetic     Cataract     Chronic lung disease     Cigarette smoker     abuse quit 2006    COPD (chronic

## 2024-02-29 ENCOUNTER — OFFICE VISIT (OUTPATIENT)
Age: 79
End: 2024-02-29
Payer: MEDICARE

## 2024-02-29 VITALS
TEMPERATURE: 97.8 F | SYSTOLIC BLOOD PRESSURE: 134 MMHG | HEART RATE: 78 BPM | HEIGHT: 64 IN | WEIGHT: 177 LBS | DIASTOLIC BLOOD PRESSURE: 82 MMHG | BODY MASS INDEX: 30.22 KG/M2 | OXYGEN SATURATION: 94 %

## 2024-02-29 DIAGNOSIS — M54.16 LUMBAR RADICULOPATHY: ICD-10-CM

## 2024-02-29 DIAGNOSIS — M48.062 SPINAL STENOSIS OF LUMBAR REGION WITH NEUROGENIC CLAUDICATION: ICD-10-CM

## 2024-02-29 DIAGNOSIS — M54.16 LUMBAR NEURITIS: ICD-10-CM

## 2024-02-29 DIAGNOSIS — M43.16 SPONDYLOLISTHESIS, LUMBAR REGION: ICD-10-CM

## 2024-02-29 DIAGNOSIS — M51.36 DDD (DEGENERATIVE DISC DISEASE), LUMBAR: Primary | ICD-10-CM

## 2024-02-29 PROCEDURE — 1123F ACP DISCUSS/DSCN MKR DOCD: CPT | Performed by: PHYSICAL MEDICINE & REHABILITATION

## 2024-02-29 PROCEDURE — 99213 OFFICE O/P EST LOW 20 MIN: CPT | Performed by: PHYSICAL MEDICINE & REHABILITATION

## 2024-02-29 RX ORDER — FLUTICASONE FUROATE, UMECLIDINIUM BROMIDE AND VILANTEROL TRIFENATATE 200; 62.5; 25 UG/1; UG/1; UG/1
1 POWDER RESPIRATORY (INHALATION) DAILY
COMMUNITY

## 2024-02-29 RX ORDER — PREGABALIN 300 MG/1
300 CAPSULE ORAL 2 TIMES DAILY
Qty: 180 CAPSULE | Refills: 0 | Status: SHIPPED | OUTPATIENT
Start: 2024-02-29 | End: 2024-05-29

## 2024-02-29 NOTE — PROGRESS NOTES
DULoxetine (CYMBALTA) 60 MG extended release capsule Take 1 capsule by mouth in the morning and at bedtime      celecoxib (CELEBREX) 200 MG capsule Take 1 capsule by mouth daily      albuterol sulfate HFA (PROVENTIL;VENTOLIN;PROAIR) 108 (90 Base) MCG/ACT inhaler Inhale 2 puffs into the lungs every 4 hours as needed      albuterol (PROVENTIL) (5 MG/ML) 0.5% nebulizer solution Inhale 0.5 mLs into the lungs every 4 hours as needed      lisinopril (PRINIVIL;ZESTRIL) 10 MG tablet TAKE 1 TABLET BY MOUTH DAILY      naproxen (NAPROSYN) 500 MG tablet 1 tablet by mouth every 8-12 hours as needed for shoulder pain      sertraline (ZOLOFT) 100 MG tablet Take 1 tablet by mouth daily      umeclidinium bromide (INCRUSE ELLIPTA) 62.5 MCG/ACT inhaler Inhale 1 puff into the lungs daily      budesonide (PULMICORT) 1 MG/2ML nebulizer suspension       pregabalin (LYRICA) 300 MG capsule Take 1 capsule by mouth 2 times daily for 90 days. Max Daily Amount: 600 mg 180 capsule 0    pregabalin (LYRICA) 300 MG capsule Take 1 capsule by mouth 2 times daily for 30 days. Max Daily Amount: 600 mg 60 capsule 0    pregabalin (LYRICA) 300 MG capsule Take 1 capsule by mouth 2 times daily for 90 days. Max Daily Amount: 600 mg 180 capsule 0    pregabalin (LYRICA) 300 MG capsule Take 1 capsule by mouth 2 times daily for 30 days. Max Daily Amount: 600 mg 60 capsule 0    arformoterol tartrate (BROVANA) 15 MCG/2ML NEBU INHALE TWO MILLILITERS (ONE VIAL) BY MOUTH TWICE A DAY      atorvastatin (LIPITOR) 20 MG tablet Take 1 tablet by mouth daily      buPROPion (WELLBUTRIN SR) 100 MG extended release tablet ceived the following from [facility]: Outside name: buPROPion SR (WELLBUTRIN SR) 100 mg SR tablet      cyclobenzaprine (FLEXERIL) 5 MG tablet Take 1 tablet by mouth 3 times daily as needed      diazePAM (VALIUM) 5 MG tablet Take 1 tablet by mouth 2 times daily as needed.      doxycycline hyclate (VIBRAMYCIN) 100 MG capsule Take 1 capsule by

## 2024-03-04 ENCOUNTER — HOSPITAL ENCOUNTER (EMERGENCY)
Facility: HOSPITAL | Age: 79
Discharge: HOME | End: 2024-03-04
Attending: STUDENT IN AN ORGANIZED HEALTH CARE EDUCATION/TRAINING PROGRAM
Payer: MEDICARE

## 2024-03-04 ENCOUNTER — APPOINTMENT (OUTPATIENT)
Facility: HOSPITAL | Age: 79
End: 2024-03-04
Payer: MEDICARE

## 2024-03-04 VITALS
SYSTOLIC BLOOD PRESSURE: 141 MMHG | WEIGHT: 177 LBS | TEMPERATURE: 98 F | BODY MASS INDEX: 30.22 KG/M2 | DIASTOLIC BLOOD PRESSURE: 59 MMHG | OXYGEN SATURATION: 98 % | HEART RATE: 85 BPM | HEIGHT: 64 IN | RESPIRATION RATE: 16 BRPM

## 2024-03-04 DIAGNOSIS — M25.469 SUPRAPATELLAR EFFUSION OF KNEE: Primary | ICD-10-CM

## 2024-03-04 PROCEDURE — 73562 X-RAY EXAM OF KNEE 3: CPT

## 2024-03-04 PROCEDURE — 99283 EMERGENCY DEPT VISIT LOW MDM: CPT

## 2024-03-04 PROCEDURE — 20610 DRAIN/INJ JOINT/BURSA W/O US: CPT

## 2024-03-04 ASSESSMENT — ENCOUNTER SYMPTOMS
CONSTIPATION: 0
SHORTNESS OF BREATH: 0
COUGH: 0
VOMITING: 0
NAUSEA: 0
ABDOMINAL PAIN: 0
DIARRHEA: 0
CHEST TIGHTNESS: 0

## 2024-03-04 ASSESSMENT — PAIN - FUNCTIONAL ASSESSMENT
PAIN_FUNCTIONAL_ASSESSMENT: NONE - DENIES PAIN
PAIN_FUNCTIONAL_ASSESSMENT: 0-10

## 2024-03-04 ASSESSMENT — PAIN SCALES - GENERAL: PAINLEVEL_OUTOF10: 4

## 2024-03-04 NOTE — ED PROVIDER NOTES
days in order to be reevaluated.  Educated on signs and symptoms to monitor for given strict return precautions.  Patient displays understanding and is in agreement with plan and discharge at this time.      Diagnosis     Clinical Impression:   1. Suprapatellar effusion of knee        Disposition: Discharge home with PCP and orthopedic follow-up    Vicky Watkins MD  [address] [facility] [address] [phone]      Follow up from ER    [facility]  [address] [facility] [id number] [phone]    As needed, If symptoms worsen          Medication List        ASK your doctor about these medications      * albuterol (5 MG/ML) 0.5% nebulizer solution  Commonly known as: PROVENTIL     * albuterol sulfate  (90 Base) MCG/ACT inhaler  Commonly known as: PROVENTIL;VENTOLIN;PROAIR     arformoterol tartrate 15 MCG/2ML Nebu  Commonly known as: BROVANA     atorvastatin 20 MG tablet  Commonly known as: LIPITOR     budesonide 1 MG/2ML nebulizer suspension  Commonly known as: PULMICORT     buPROPion 100 MG extended release tablet  Commonly known as: WELLBUTRIN SR     celecoxib 200 MG capsule  Commonly known as: CELEBREX     cyclobenzaprine 5 MG tablet  Commonly known as: FLEXERIL     diazePAM 5 MG tablet  Commonly known as: VALIUM     doxycycline hyclate 100 MG capsule  Commonly known as: VIBRAMYCIN     * DULoxetine 20 MG extended release capsule  Commonly known as: CYMBALTA     * DULoxetine 60 MG extended release capsule  Commonly known as: CYMBALTA     * gabapentin 100 MG capsule  Commonly known as: NEURONTIN     * gabapentin 400 MG capsule  Commonly known as: NEURONTIN     Incruse Ellipta 62.5 MCG/ACT inhaler  Generic drug: umeclidinium bromide     lisinopril 10 MG tablet  Commonly known as: PRINIVIL;ZESTRIL     naproxen 500 MG tablet  Commonly known as: NAPROSYN     pravastatin 20 MG tablet  Commonly known as: PRAVACHOL     * pregabalin 300 MG capsule  Commonly known as: LYRICA  Take 1

## 2024-03-04 NOTE — ED TRIAGE NOTES
Patient arrived to ER with right knee pain and swelling, thinks she needs it drained. Patient unable to see orthopedist today or tomorrow and was told to come to ER to have it drained.

## 2024-03-04 NOTE — DISCHARGE INSTRUCTIONS
Continue keeping compression on the area to help decrease pain and swelling.  Rest, ice, compress, elevate the affected knee.  Please follow-up with orthopedic within the next 2 days in order to be reevaluated.  Return to the ED with any new or worsening symptoms.

## 2024-03-12 ENCOUNTER — HOSPITAL ENCOUNTER (OUTPATIENT)
Facility: HOSPITAL | Age: 79
Discharge: HOME | End: 2024-03-15
Attending: ORTHOPAEDIC SURGERY
Payer: MEDICARE

## 2024-03-12 DIAGNOSIS — M16.11 PRIMARY OSTEOARTHRITIS OF RIGHT HIP: ICD-10-CM

## 2024-03-12 DIAGNOSIS — M76.891 ENTHESOPATHY OF RIGHT HIP: ICD-10-CM

## 2024-03-12 DIAGNOSIS — G89.29 CHRONIC PAIN OF RIGHT HIP: ICD-10-CM

## 2024-03-12 DIAGNOSIS — M25.551 CHRONIC PAIN OF RIGHT HIP: ICD-10-CM

## 2024-03-12 PROCEDURE — 73721 MRI JNT OF LWR EXTRE W/O DYE: CPT

## 2024-03-13 ENCOUNTER — OFFICE VISIT (OUTPATIENT)
Age: 79
End: 2024-03-13
Payer: MEDICARE

## 2024-03-13 ENCOUNTER — HOSPITAL ENCOUNTER (OUTPATIENT)
Facility: HOSPITAL | Age: 79
Setting detail: SPECIMEN
Discharge: HOME | End: 2024-03-16

## 2024-03-13 VITALS — WEIGHT: 174 LBS | HEIGHT: 63 IN | BODY MASS INDEX: 30.83 KG/M2

## 2024-03-13 DIAGNOSIS — M70.41 PREPATELLAR BURSITIS OF RIGHT KNEE: ICD-10-CM

## 2024-03-13 DIAGNOSIS — M70.41 PREPATELLAR BURSITIS OF RIGHT KNEE: Primary | ICD-10-CM

## 2024-03-13 LAB
BODY FLD TYPE: NORMAL
CRYSTALS FLD MICRO: NORMAL
LABCORP SPECIMEN COLLECTION: NORMAL

## 2024-03-13 PROCEDURE — 1123F ACP DISCUSS/DSCN MKR DOCD: CPT | Performed by: PHYSICIAN ASSISTANT

## 2024-03-13 PROCEDURE — 89060 EXAM SYNOVIAL FLUID CRYSTALS: CPT

## 2024-03-13 PROCEDURE — 99214 OFFICE O/P EST MOD 30 MIN: CPT | Performed by: PHYSICIAN ASSISTANT

## 2024-03-13 PROCEDURE — 99001 SPECIMEN HANDLING PT-LAB: CPT

## 2024-03-13 PROCEDURE — 20610 DRAIN/INJ JOINT/BURSA W/O US: CPT | Performed by: PHYSICIAN ASSISTANT

## 2024-03-13 NOTE — PROGRESS NOTES
Patient: Karyn Thomas                MRN: [id number]       SSN: [id number]  YOB: 1945        AGE: 78 y.o.        SEX: female  Body mass index is 30.82 kg/m².    PCP: Vicky Watkins MD  03/13/24            REVIEW OF SYSTEMS:  Constitutional: Negative for fever, chills, weight loss and malaise/fatigue.   HENT: Negative.    Eyes: Negative.    Respiratory: Negative.   Cardiovascular: Negative.   Gastrointestinal: No bowel incontinence or constipation.  Genitourinary: No bladder incontinence or saddle anesthesia.  Skin: Negative.   Neurological: Negative.    Endo/Heme/Allergies: Negative.    Psychiatric/Behavioral: Negative.  Musculoskeletal: As per HPI above.     Past Medical History:   Diagnosis Date    Allergic rhinitis     on allergy shots    Anemia     Asthma     Borderline diabetic     Cataract     Chronic lung disease     Cigarette smoker     abuse quit 2006    COPD (chronic obstructive pulmonary disease) ([facility])     Degenerative arthritis     both shoulders    Depression     Easy bruising     Hypertension     Nervousness     Osteoporosis 10/27/99    Personal history of smoking 9/10/2020    Pharyngoesophageal dysphagia 9/10/2020         Current Outpatient Medications:     fluticasone-umeclidin-vilant (TRELEGY ELLIPTA) 200-62.5-25 MCG/ACT AEPB inhaler, Inhale 1 puff into the lungs daily, Disp: , Rfl:     pregabalin (LYRICA) 300 MG capsule, Take 1 capsule by mouth 2 times daily for 90 days. Max Daily Amount: 600 mg, Disp: 180 capsule, Rfl: 0    budesonide (PULMICORT) 1 MG/2ML nebulizer suspension, , Disp: , Rfl:     pregabalin (LYRICA) 300 MG capsule, Take 1 capsule by mouth 2 times daily for 90 days. Max Daily Amount: 600 mg, Disp: 180 capsule, Rfl: 0    pregabalin (LYRICA) 300 MG capsule, Take 1 capsule by mouth 2 times daily for 30 days. Max Daily Amount: 600 mg, Disp: 60 capsule, Rfl: 0    DULoxetine (CYMBALTA) 60 MG extended release capsule, Take 1 capsule by mouth in the morning and at

## 2024-03-14 LAB
APPEARANCE FLD: ABNORMAL
COLOR FLD: ABNORMAL
CRYSTALS FLD MICRO: ABNORMAL
EOSINOPHIL NFR FLD MANUAL: 2 %
LYMPHOCYTES NFR FLD MANUAL: 68 %
MACROPHAGES NFR FLD MANUAL: 1 %
NEUTROPHILS NFR FLD MANUAL: 29 %
NUC CELL # FLD: 116 CELLS/UL (ref 0–200)
RBC # FLD AUTO: ABNORMAL /UL
SPECIMEN STATUS REPORT: NORMAL
SYNOVIOCYTES NFR SNV: 0 %

## 2024-03-16 LAB
GRAM STN SPEC: NORMAL
GRAM STN SPEC: NORMAL

## 2024-03-17 LAB — BACTERIA FLD CULT: NORMAL

## 2024-03-18 LAB — BACTERIA FLD CULT: NORMAL

## 2024-03-21 ENCOUNTER — OFFICE VISIT (OUTPATIENT)
Age: 79
End: 2024-03-21

## 2024-03-21 DIAGNOSIS — M12.811 ROTATOR CUFF ARTHROPATHY OF RIGHT SHOULDER: Primary | ICD-10-CM

## 2024-03-21 NOTE — PROGRESS NOTES
Karyn Thomas  1945   Chief Complaint   Patient presents with    Shoulder Pain     Allan shoulder        HISTORY OF PRESENT ILLNESS  Karyn Thomas is a 78 y.o. female who presents today for reevaluation of bilateral shoulder pain. Pain is a 8/10. At OV on 11/27/2023, patient received a bilateral shoulder cortisone injection which provided moderate relief.  Having difficulty with activities of daily living which have been increasing over the recent past to the point that is limiting her activities even further.  Still having significant amount of pain at nighttime  Patient denies any fever, chills, chest pain, shortness of breath or calf pain. The remainder of the review of systems is negative. There are no new illness or injuries to report since last seen in the office. No changes in medications, allergies, social or family history.      PHYSICAL EXAM:   There were no vitals taken for this visit.  The patient is a well-developed, well-nourished female   in no acute distress.  The patient is alert and oriented times three.  The patient is alert and oriented times three. Mood and affect are normal.  LYMPHATIC: lymph nodes are not enlarged and are within normal limits  SKIN: normal in color and non tender to palpation. There are no bruises or abrasions noted.   NEUROLOGICAL: Motor sensory exam is within normal limits. Reflexes are equal bilaterally. There is normal sensation to pinprick and light touch  MUSCULOSKELETAL:     Examination  Right shoulder     Skin  Intact     AC joint tenderness  -     Biceps tenderness  -     Forward flexion/Elevation ROM  60     Active abduction ROM  60     Glenohumeral abduction  45     External rotation ROM  0     Internal rotation ROM  30     Apprehension  -     Princess's Relocation  -     Jerk  -     Load and Shift  -     O'briens  -     Speeds  -     Impingement sign  -     Supraspinatus/Empty Can  -, 5/5     External Rotation Strength  -, 5/5     Lift Off/Belly Press  -,

## 2024-03-25 RX ORDER — TRIAMCINOLONE ACETONIDE 40 MG/ML
40 INJECTION, SUSPENSION INTRA-ARTICULAR; INTRAMUSCULAR ONCE
Status: COMPLETED | OUTPATIENT
Start: 2024-03-25 | End: 2024-03-25

## 2024-03-25 RX ORDER — LIDOCAINE HYDROCHLORIDE 10 MG/ML
6 INJECTION, SOLUTION INFILTRATION; PERINEURAL ONCE
Status: COMPLETED | OUTPATIENT
Start: 2024-03-25 | End: 2024-03-25

## 2024-03-25 RX ADMIN — LIDOCAINE HYDROCHLORIDE 6 ML: 10 INJECTION, SOLUTION INFILTRATION; PERINEURAL at 09:20

## 2024-03-25 RX ADMIN — TRIAMCINOLONE ACETONIDE 40 MG: 40 INJECTION, SUSPENSION INTRA-ARTICULAR; INTRAMUSCULAR at 09:21

## 2024-03-25 RX ADMIN — LIDOCAINE HYDROCHLORIDE 6 ML: 10 INJECTION, SOLUTION INFILTRATION; PERINEURAL at 09:21

## 2024-05-17 ENCOUNTER — OFFICE VISIT (OUTPATIENT)
Age: 79
End: 2024-05-17
Payer: MEDICARE

## 2024-05-17 DIAGNOSIS — G89.29 CHRONIC PAIN OF LEFT KNEE: Primary | ICD-10-CM

## 2024-05-17 DIAGNOSIS — M17.11 PRIMARY OSTEOARTHRITIS OF RIGHT KNEE: ICD-10-CM

## 2024-05-17 DIAGNOSIS — M17.12 PRIMARY OSTEOARTHRITIS OF LEFT KNEE: ICD-10-CM

## 2024-05-17 DIAGNOSIS — M25.562 CHRONIC PAIN OF LEFT KNEE: Primary | ICD-10-CM

## 2024-05-17 PROCEDURE — 1123F ACP DISCUSS/DSCN MKR DOCD: CPT | Performed by: PHYSICIAN ASSISTANT

## 2024-05-17 PROCEDURE — 99214 OFFICE O/P EST MOD 30 MIN: CPT | Performed by: PHYSICIAN ASSISTANT

## 2024-05-17 PROCEDURE — 73564 X-RAY EXAM KNEE 4 OR MORE: CPT | Performed by: PHYSICIAN ASSISTANT

## 2024-05-17 PROCEDURE — 20611 DRAIN/INJ JOINT/BURSA W/US: CPT | Performed by: PHYSICIAN ASSISTANT

## 2024-05-17 RX ORDER — LIDOCAINE HYDROCHLORIDE 10 MG/ML
3 INJECTION, SOLUTION INFILTRATION; PERINEURAL ONCE
Status: COMPLETED | OUTPATIENT
Start: 2024-05-17 | End: 2024-05-17

## 2024-05-17 RX ORDER — BETAMETHASONE SODIUM PHOSPHATE AND BETAMETHASONE ACETATE 3; 3 MG/ML; MG/ML
6 INJECTION, SUSPENSION INTRA-ARTICULAR; INTRALESIONAL; INTRAMUSCULAR; SOFT TISSUE ONCE
Status: COMPLETED | OUTPATIENT
Start: 2024-05-17 | End: 2024-05-17

## 2024-05-17 RX ADMIN — LIDOCAINE HYDROCHLORIDE 3 ML: 10 INJECTION, SOLUTION INFILTRATION; PERINEURAL at 13:46

## 2024-05-17 RX ADMIN — BETAMETHASONE SODIUM PHOSPHATE AND BETAMETHASONE ACETATE 6 MG: 3; 3 INJECTION, SUSPENSION INTRA-ARTICULAR; INTRALESIONAL; INTRAMUSCULAR; SOFT TISSUE at 13:45

## 2024-05-17 NOTE — PROGRESS NOTES
Patient: Karyn Thomas                MRN: [id number]       SSN: [id number]  YOB: 1945        AGE: 78 y.o.        SEX: female  There is no height or weight on file to calculate BMI.    PCP: Vicky Watkins MD  05/17/24            REVIEW OF SYSTEMS:  Constitutional: Negative for fever, chills, weight loss and malaise/fatigue.   HENT: Negative.    Eyes: Negative.    Respiratory: Negative.   Cardiovascular: Negative.   Gastrointestinal: No bowel incontinence or constipation.  Genitourinary: No bladder incontinence or saddle anesthesia.  Skin: Negative.   Neurological: Negative.    Endo/Heme/Allergies: Negative.    Psychiatric/Behavioral: Negative.  Musculoskeletal: As per HPI above.     Past Medical History:   Diagnosis Date    Allergic rhinitis     on allergy shots    Anemia     Asthma     Borderline diabetic     Cataract     Chronic lung disease     Cigarette smoker     abuse quit 2006    COPD (chronic obstructive pulmonary disease) ([facility])     Degenerative arthritis     both shoulders    Depression     Easy bruising     Hypertension     Nervousness     Osteoporosis 10/27/99    Personal history of smoking 9/10/2020    Pharyngoesophageal dysphagia 9/10/2020         Current Outpatient Medications:     fluticasone-umeclidin-vilant (TRELEGY ELLIPTA) 200-62.5-25 MCG/ACT AEPB inhaler, Inhale 1 puff into the lungs daily, Disp: , Rfl:     pregabalin (LYRICA) 300 MG capsule, Take 1 capsule by mouth 2 times daily for 90 days. Max Daily Amount: 600 mg, Disp: 180 capsule, Rfl: 0    budesonide (PULMICORT) 1 MG/2ML nebulizer suspension, , Disp: , Rfl:     pregabalin (LYRICA) 300 MG capsule, Take 1 capsule by mouth 2 times daily for 90 days. Max Daily Amount: 600 mg, Disp: 180 capsule, Rfl: 0    pregabalin (LYRICA) 300 MG capsule, Take 1 capsule by mouth 2 times daily for 30 days. Max Daily Amount: 600 mg, Disp: 60 capsule, Rfl: 0    DULoxetine (CYMBALTA) 60 MG extended release capsule, Take 1 capsule by

## 2024-05-30 ENCOUNTER — OFFICE VISIT (OUTPATIENT)
Age: 79
End: 2024-05-30
Payer: MEDICARE

## 2024-05-30 VITALS
BODY MASS INDEX: 31.18 KG/M2 | SYSTOLIC BLOOD PRESSURE: 132 MMHG | RESPIRATION RATE: 18 BRPM | HEIGHT: 63 IN | HEART RATE: 91 BPM | OXYGEN SATURATION: 94 % | WEIGHT: 176 LBS | DIASTOLIC BLOOD PRESSURE: 62 MMHG | TEMPERATURE: 98.2 F

## 2024-05-30 DIAGNOSIS — M54.16 LUMBAR NEURITIS: Primary | ICD-10-CM

## 2024-05-30 DIAGNOSIS — M48.062 SPINAL STENOSIS OF LUMBAR REGION WITH NEUROGENIC CLAUDICATION: ICD-10-CM

## 2024-05-30 DIAGNOSIS — M43.16 SPONDYLOLISTHESIS, LUMBAR REGION: ICD-10-CM

## 2024-05-30 DIAGNOSIS — M51.36 DDD (DEGENERATIVE DISC DISEASE), LUMBAR: ICD-10-CM

## 2024-05-30 PROCEDURE — 99214 OFFICE O/P EST MOD 30 MIN: CPT | Performed by: PHYSICAL MEDICINE & REHABILITATION

## 2024-05-30 PROCEDURE — 1123F ACP DISCUSS/DSCN MKR DOCD: CPT | Performed by: PHYSICAL MEDICINE & REHABILITATION

## 2024-05-30 RX ORDER — PREGABALIN 300 MG/1
300 CAPSULE ORAL 2 TIMES DAILY
Qty: 180 CAPSULE | Refills: 0 | Status: SHIPPED | OUTPATIENT
Start: 2024-05-30 | End: 2024-08-28

## 2024-05-30 NOTE — PROGRESS NOTES
Karyn Thomas presents today for   Chief Complaint   Patient presents with    Back Problem    Pain    Back Pain       Is someone accompanying this pt? no    Is the patient using any DME equipment during OV? no    Depression Screening:       No data to display                Learning Assessment:  Failed to redirect to the Timeline version of the eTruck SmartLink.    Abuse Screening:       No data to display                Fall Risk  Failed to redirect to the Timeline version of the eTruck SmartLink.    OPIOID RISK TOOL  Failed to redirect to the Timeline version of the eTruck SmartLink.    Coordination of Care:  1. Have you been to the ER, urgent care clinic since your last visit? no  Hospitalized since your last visit? no    2. Have you seen or consulted any other health care providers outside of the [facility] since your last visit? no Include any pap smears or colon screening. no

## 2024-05-30 NOTE — PROGRESS NOTES
[facility]  [address]  [place] [id number]  Tel: [phone]  Fax: [phone]          PROGRESS NOTE      HISTORY OF PRESENT ILLNESS:  The patient is a 78 y.o. female and was seen today for follow up of   low back pain radiating down the BLE(L=R) in a S1 distribution to the knees. Previously seen for low back pain radiating into the LLE in a S1 distribution to the knee,  occasionally to the ankle. Previously seen for low back pain that intermittently radiates into her BLE in the S1 distribution. Previously seen  for pain and paraesthesias in the BLE (RLE>LLE) to the ankles. Her paraesthesias is worse in the distal aspect of the BLE. Previously seen for right sided low back pain that radiates posteriorly down to her calf with intermittent radiation to her  foot x few months. Previously, she was seen for right sided low back pain that radiates posteriorly down to her calf with intermittent radiation to her foot x a few months. Previously, she was seen  for left-sided lower back pain radiating into the LLE in a S1 distribution to the ankle. Previously, she was seen for progressive, intermittent low back pain into the LLE radiating to the ankle x 8/2019 without trauma. Her pain is exacerbated with walking. Positive shopping cart sign. She has completed MDP without benefit. She is currently on  Cymbalta 90 mg secondary to depression. Patient failed NEURONTIN  800 mg TID secondary to no relief. Pt underwent left L5 and S1 SNRB on 5/28/2020 with good relief. Pt underwent left-sided L5 and S1 SNRB on 1/21/2021 with relief.  She underwent a left L5-S1 SNRB on 6/3/2021 with benefit, her left side was  doing well. Patient underwent B/L L5, B/L S1 SNRB 3/10/2022 with benefit. Patient says we did an L3-L4 epidural dated 10/27/2022 with some relief. Patient  says she is 50% better since the epidural. Pt denies change in bowel or bladder habits. Pt denies fever, weight

## 2024-06-06 ENCOUNTER — HOSPITAL ENCOUNTER (OUTPATIENT)
Facility: HOSPITAL | Age: 79
End: 2024-06-06
Payer: MEDICARE

## 2024-06-06 VITALS
TEMPERATURE: 98.9 F | OXYGEN SATURATION: 95 % | SYSTOLIC BLOOD PRESSURE: 113 MMHG | HEART RATE: 95 BPM | RESPIRATION RATE: 16 BRPM | DIASTOLIC BLOOD PRESSURE: 69 MMHG

## 2024-06-06 PROBLEM — M54.16 LUMBAR RADICULOPATHY: Status: ACTIVE | Noted: 2024-06-06

## 2024-06-06 PROCEDURE — 2500000003 HC RX 250 WO HCPCS: Performed by: PHYSICAL MEDICINE & REHABILITATION

## 2024-06-06 PROCEDURE — 6370000000 HC RX 637 (ALT 250 FOR IP): Performed by: PHYSICAL MEDICINE & REHABILITATION

## 2024-06-06 PROCEDURE — 62323 NJX INTERLAMINAR LMBR/SAC: CPT | Performed by: PHYSICAL MEDICINE & REHABILITATION

## 2024-06-06 PROCEDURE — 62323 NJX INTERLAMINAR LMBR/SAC: CPT

## 2024-06-06 PROCEDURE — 6360000002 HC RX W HCPCS: Performed by: PHYSICAL MEDICINE & REHABILITATION

## 2024-06-06 RX ORDER — DIAZEPAM 5 MG/1
5 TABLET ORAL ONCE
Status: COMPLETED | OUTPATIENT
Start: 2024-06-06 | End: 2024-06-06

## 2024-06-06 RX ORDER — LIDOCAINE HYDROCHLORIDE 10 MG/ML
30 INJECTION, SOLUTION EPIDURAL; INFILTRATION; INTRACAUDAL; PERINEURAL ONCE
Status: COMPLETED | OUTPATIENT
Start: 2024-06-06 | End: 2024-06-06

## 2024-06-06 RX ORDER — DEXAMETHASONE SODIUM PHOSPHATE 10 MG/ML
10 INJECTION, SOLUTION INTRAMUSCULAR; INTRAVENOUS ONCE
Status: COMPLETED | OUTPATIENT
Start: 2024-06-06 | End: 2024-06-06

## 2024-06-06 RX ORDER — DIAZEPAM 5 MG/1
10 TABLET ORAL ONCE
Status: COMPLETED | OUTPATIENT
Start: 2024-06-06 | End: 2024-06-06

## 2024-06-06 RX ORDER — DIAZEPAM 5 MG/1
2.5 TABLET ORAL ONCE
Status: COMPLETED | OUTPATIENT
Start: 2024-06-06 | End: 2024-06-06

## 2024-06-06 RX ADMIN — LIDOCAINE HYDROCHLORIDE 12 ML: 10 INJECTION, SOLUTION EPIDURAL; INFILTRATION; INTRACAUDAL; PERINEURAL at 14:24

## 2024-06-06 RX ADMIN — DIAZEPAM 5 MG: 5 TABLET ORAL at 13:35

## 2024-06-06 RX ADMIN — DEXAMETHASONE SODIUM PHOSPHATE 10 MG: 10 INJECTION, SOLUTION INTRAMUSCULAR; INTRAVENOUS at 14:26

## 2024-06-06 ASSESSMENT — PAIN SCALES - GENERAL: PAINLEVEL_OUTOF10: 0

## 2024-06-06 ASSESSMENT — PAIN - FUNCTIONAL ASSESSMENT: PAIN_FUNCTIONAL_ASSESSMENT: 0-10

## 2024-06-06 ASSESSMENT — PAIN DESCRIPTION - DESCRIPTORS: DESCRIPTORS: ACHING

## 2024-06-06 NOTE — OP NOTE
Intralaminar Epidural Steroid Block Procedure Note      Patient Name: Karyn Thomas    Date of Procedure: June 6, 2024    Preoperative Diagnosis: M54.16    Post Operative Diagnosis: same    Procedure: L5-S1 Epidural Block     PROCEDURE:  Lumbar Epidural Block    Consent:  Informed  Consent was obtained prior to the procedure.  The patient was given the opportunity to ask questions regarding the procedure and its associated risks.  In addition to the potential risks associated with the procedure itself, the patient was informed both verbally and in writing of potential side effects of the use glucocorticoids.  The patient appeared to comprehend the informed consent and desired to have the procedure performed.      The patient was placed in the prone position on the fluoroscopy table and the back prepped and draped in the usual sterile manner.  The interlaminar space was identified using fluoroscopy and the skin anesthetized with 1% Lidocaine.  A #18 Tuohy Epidural needle was advanced under fluoroscopic control from a paramedian approach to the  epidural space as noted above, using the loss of resistance to fluid technique.    Then, 10 mg of preservative free Dexamethasone and 2 cc of Lidocaine was slowly injected.      The patient tolerated the procedure well.  The injection area was cleaned and band aids applied.  No excessive bleeding was noted.  Patient dressed and was discharged to home with instructions.

## 2024-07-11 ENCOUNTER — HOSPITAL ENCOUNTER (OUTPATIENT)
Facility: HOSPITAL | Age: 79
Setting detail: SPECIMEN
Discharge: HOME | End: 2024-07-14

## 2024-07-11 ENCOUNTER — OFFICE VISIT (OUTPATIENT)
Age: 79
End: 2024-07-11
Payer: MEDICARE

## 2024-07-11 DIAGNOSIS — M70.41 PREPATELLAR BURSITIS OF RIGHT KNEE: Primary | ICD-10-CM

## 2024-07-11 DIAGNOSIS — M70.41 PREPATELLAR BURSITIS OF RIGHT KNEE: ICD-10-CM

## 2024-07-11 LAB
APPEARANCE FLD: ABNORMAL
BODY FLD TYPE: NORMAL
COLOR FLD: ABNORMAL
CRYSTALS FLD MICRO: NORMAL
EOSINOPHIL NFR FLD MANUAL: 1 %
LABCORP SPECIMEN COLLECTION: NORMAL
LYMPHOCYTES NFR FLD: 18 %
MONOCYTES NFR FLD: 21 %
NEUTROPHILS NFR FLD: 60 % (ref 0–25)
NEUTS BAND # FLD: 0 %
NUC CELL # FLD: 251 /CU MM
RBC # FLD: ABNORMAL /CU MM
SPECIMEN SOURCE FLD: ABNORMAL

## 2024-07-11 PROCEDURE — 89051 BODY FLUID CELL COUNT: CPT

## 2024-07-11 PROCEDURE — 1123F ACP DISCUSS/DSCN MKR DOCD: CPT | Performed by: PHYSICIAN ASSISTANT

## 2024-07-11 PROCEDURE — 20610 DRAIN/INJ JOINT/BURSA W/O US: CPT | Performed by: PHYSICIAN ASSISTANT

## 2024-07-11 PROCEDURE — 89060 EXAM SYNOVIAL FLUID CRYSTALS: CPT

## 2024-07-11 PROCEDURE — 99214 OFFICE O/P EST MOD 30 MIN: CPT | Performed by: PHYSICIAN ASSISTANT

## 2024-07-11 PROCEDURE — 99001 SPECIMEN HANDLING PT-LAB: CPT

## 2024-07-11 NOTE — PROGRESS NOTES
following reviews on Karyn Thomas prior to the start of the procedure:  ????????  * Patient was identified by name and date of birth   * Agreement on procedure being performed was verified  * Risks and Benefits explained to the patient  * Procedure site verified and marked as necessary  * Patient was positioned for comfort  * Consent was signed and verified    Time:1:29 PM    Body part: right knee, intra-bursal    Medication & Dose: none    Date of procedure: 07/11/24    Procedure performed by: RUSSELL CORTES PA-C    Provider assisted by: none    Patient assisted by: self    How tolerated by patient: tolerated the procedure well with no complications    Post Procedural Pain Scale: 2    Comments:   Will send fluid off for stat cell count, Gram stain, culture sensitivity, crystals       JR Demetrius BRUCE PA-C, ATC

## 2024-07-12 LAB — SPECIMEN STATUS REPORT: NORMAL

## 2024-07-13 LAB
APPEARANCE FLD: ABNORMAL
CELL FRACT FLD-IMP: ABNORMAL
COLOR FLD: ABNORMAL
CRYSTALS FLD MICRO: ABNORMAL
LYMPHOCYTES NFR FLD MANUAL: ABNORMAL %
MACROPHAGES NFR FLD MANUAL: ABNORMAL %
NEUTROPHILS NFR FLD MANUAL: ABNORMAL %
NUC CELL # FLD: 86 CELLS/UL (ref 0–200)
RBC # FLD AUTO: ABNORMAL /UL

## 2024-07-14 LAB
BACTERIA FLD CULT: NORMAL
GRAM STN SPEC: NORMAL
GRAM STN SPEC: NORMAL

## 2024-07-20 LAB
BACTERIA FLD CULT: NORMAL
GRAM STN SPEC: NORMAL
GRAM STN SPEC: NORMAL

## 2024-07-24 LAB
BACTERIA IDENTIFIED IN ISOLATE BY MS.MALDI-TOF: NORMAL
MICROORGANISM ISLT MS.MALDI-TOF: NORMAL

## 2024-07-28 LAB
BACTERIA FLD CULT: NORMAL
GRAM STN SPEC: NORMAL
GRAM STN SPEC: NORMAL

## 2024-08-04 LAB
BACTERIA FLD CULT: NORMAL
GRAM STN SPEC: NORMAL
GRAM STN SPEC: NORMAL

## 2024-08-07 LAB
BACTERIA IDENTIFIED IN ISOLATE BY MS.MALDI-TOF: NORMAL
BACTERIA ISLT: NORMAL
MICROORGANISM ISLT MS.MALDI-TOF: NORMAL
MICROORGANISM/AGENT SPEC: NORMAL
SPECIMEN STATUS REPORT: NORMAL

## 2024-08-08 LAB
BACTERIA FLD CULT: ABNORMAL
GRAM STN SPEC: ABNORMAL
GRAM STN SPEC: ABNORMAL

## 2024-08-16 ENCOUNTER — TELEPHONE (OUTPATIENT)
Age: 79
End: 2024-08-16

## 2024-08-16 NOTE — TELEPHONE ENCOUNTER
Dr. Mercado's office called and stated that they are unable to contact  patient to schedule per referral

## 2024-08-16 NOTE — TELEPHONE ENCOUNTER
Patient called and is requesting a call back states she has a few questions about her referral that was sent over to [facility].      Please call and advise patient at   [phone]

## 2024-08-16 NOTE — TELEPHONE ENCOUNTER
Dr. Jess Gusman's office call back to state that they have the patient schedule for 8/8/2024    Dr. Jess Gusman's office tel [phone]

## 2024-09-09 ENCOUNTER — TELEPHONE (OUTPATIENT)
Age: 79
End: 2024-09-09

## 2024-09-09 DIAGNOSIS — M43.16 SPONDYLOLISTHESIS, LUMBAR REGION: ICD-10-CM

## 2024-09-09 DIAGNOSIS — M48.062 SPINAL STENOSIS OF LUMBAR REGION WITH NEUROGENIC CLAUDICATION: ICD-10-CM

## 2024-09-09 DIAGNOSIS — M54.16 LUMBAR NEURITIS: ICD-10-CM

## 2024-09-09 DIAGNOSIS — M51.36 DDD (DEGENERATIVE DISC DISEASE), LUMBAR: ICD-10-CM

## 2024-09-09 RX ORDER — PREGABALIN 300 MG/1
300 CAPSULE ORAL 2 TIMES DAILY
Qty: 20 CAPSULE | Refills: 0 | Status: SHIPPED | OUTPATIENT
Start: 2024-09-09 | End: 2024-09-19

## 2024-09-19 ENCOUNTER — OFFICE VISIT (OUTPATIENT)
Age: 79
End: 2024-09-19
Payer: MEDICARE

## 2024-09-19 VITALS — HEIGHT: 63 IN | BODY MASS INDEX: 30.12 KG/M2 | WEIGHT: 170 LBS

## 2024-09-19 DIAGNOSIS — M51.36 DDD (DEGENERATIVE DISC DISEASE), LUMBAR: ICD-10-CM

## 2024-09-19 DIAGNOSIS — M43.16 SPONDYLOLISTHESIS, LUMBAR REGION: ICD-10-CM

## 2024-09-19 DIAGNOSIS — M48.062 SPINAL STENOSIS OF LUMBAR REGION WITH NEUROGENIC CLAUDICATION: ICD-10-CM

## 2024-09-19 DIAGNOSIS — M54.16 LUMBAR NEURITIS: Primary | ICD-10-CM

## 2024-09-19 PROCEDURE — 99214 OFFICE O/P EST MOD 30 MIN: CPT | Performed by: PHYSICAL MEDICINE & REHABILITATION

## 2024-09-19 PROCEDURE — 1123F ACP DISCUSS/DSCN MKR DOCD: CPT | Performed by: PHYSICAL MEDICINE & REHABILITATION

## 2024-09-19 RX ORDER — PREGABALIN 300 MG/1
300 CAPSULE ORAL 2 TIMES DAILY
Qty: 180 CAPSULE | Refills: 0 | Status: SHIPPED | OUTPATIENT
Start: 2024-09-19 | End: 2024-12-18

## 2024-09-26 ENCOUNTER — OFFICE VISIT (OUTPATIENT)
Age: 79
End: 2024-09-26
Payer: MEDICARE

## 2024-09-26 VITALS — WEIGHT: 170 LBS | HEIGHT: 63 IN | BODY MASS INDEX: 30.12 KG/M2

## 2024-09-26 DIAGNOSIS — M12.811 ROTATOR CUFF ARTHROPATHY OF RIGHT SHOULDER: Primary | ICD-10-CM

## 2024-09-26 DIAGNOSIS — M12.812 ROTATOR CUFF ARTHROPATHY, LEFT: ICD-10-CM

## 2024-09-26 PROCEDURE — 20611 DRAIN/INJ JOINT/BURSA W/US: CPT | Performed by: ORTHOPAEDIC SURGERY

## 2024-09-26 PROCEDURE — 1123F ACP DISCUSS/DSCN MKR DOCD: CPT | Performed by: ORTHOPAEDIC SURGERY

## 2024-09-26 PROCEDURE — 99214 OFFICE O/P EST MOD 30 MIN: CPT | Performed by: ORTHOPAEDIC SURGERY

## 2024-09-26 RX ORDER — TRIAMCINOLONE ACETONIDE 40 MG/ML
40 INJECTION, SUSPENSION INTRA-ARTICULAR; INTRAMUSCULAR ONCE
Status: COMPLETED | OUTPATIENT
Start: 2024-09-26 | End: 2024-09-26

## 2024-09-26 RX ORDER — LIDOCAINE HYDROCHLORIDE 10 MG/ML
6 INJECTION, SOLUTION INFILTRATION; PERINEURAL ONCE
Status: COMPLETED | OUTPATIENT
Start: 2024-09-26 | End: 2024-09-26

## 2024-09-26 RX ADMIN — TRIAMCINOLONE ACETONIDE 40 MG: 40 INJECTION, SUSPENSION INTRA-ARTICULAR; INTRAMUSCULAR at 11:02

## 2024-09-26 RX ADMIN — LIDOCAINE HYDROCHLORIDE 6 ML: 10 INJECTION, SOLUTION INFILTRATION; PERINEURAL at 11:02

## 2024-10-03 NOTE — ED NOTES
Patient has been provided discharge instructions. My Chart and [facility] has been discussed, and if any prescriptions were provided or called in, they have been reviewed with the patient, as well. Allowed time for questions and clarification. [FreeTextEntry1] : Pt with slightly elevated LFTS's- she states when she was diagnosed she increased her wine consuption however has stopped last week.  Will obtain abdominal US and repeat labs in 2-3 weeks

## 2024-11-21 ENCOUNTER — HOSPITAL ENCOUNTER (EMERGENCY)
Facility: HOSPITAL | Age: 79
Discharge: HOME | End: 2024-11-21
Payer: MEDICARE

## 2024-11-21 VITALS
SYSTOLIC BLOOD PRESSURE: 137 MMHG | TEMPERATURE: 97.9 F | HEART RATE: 85 BPM | DIASTOLIC BLOOD PRESSURE: 60 MMHG | HEIGHT: 63 IN | RESPIRATION RATE: 19 BRPM | OXYGEN SATURATION: 95 % | WEIGHT: 170 LBS | BODY MASS INDEX: 30.12 KG/M2

## 2024-11-21 DIAGNOSIS — L08.9 LOCAL INFECTION OF SKIN AND SUBCUTANEOUS TISSUE: Primary | ICD-10-CM

## 2024-11-21 PROCEDURE — 99283 EMERGENCY DEPT VISIT LOW MDM: CPT

## 2024-11-21 RX ORDER — DOXYCYCLINE 100 MG/1
100 CAPSULE ORAL 2 TIMES DAILY
Qty: 20 CAPSULE | Refills: 0 | Status: SHIPPED | OUTPATIENT
Start: 2024-11-21 | End: 2024-12-01

## 2024-11-21 ASSESSMENT — ENCOUNTER SYMPTOMS
VOMITING: 0
RHINORRHEA: 0
DIARRHEA: 0
EYE REDNESS: 0
NAUSEA: 0
SORE THROAT: 0
BACK PAIN: 0
ABDOMINAL PAIN: 0
EYE DISCHARGE: 0
COUGH: 0
WHEEZING: 0
SHORTNESS OF BREATH: 0
CONSTIPATION: 0

## 2024-11-21 ASSESSMENT — PAIN - FUNCTIONAL ASSESSMENT: PAIN_FUNCTIONAL_ASSESSMENT: NONE - DENIES PAIN

## 2024-11-21 ASSESSMENT — LIFESTYLE VARIABLES
HOW OFTEN DO YOU HAVE A DRINK CONTAINING ALCOHOL: NEVER
HOW MANY STANDARD DRINKS CONTAINING ALCOHOL DO YOU HAVE ON A TYPICAL DAY: PATIENT DOES NOT DRINK

## 2024-11-21 NOTE — ED PROVIDER NOTES
[facility] EMERGENCY DEPT  EMERGENCY DEPARTMENT ENCOUNTER      Pt Name: Karyn Thomas  Pt Age: 79 y.o.  Sex: female   MRN: [id number]  CSN: [id number]   Birthdate 1945  Date of evaluation: 11/21/2024  Provider: ARIEL SEGURA  Room: [address]  Time Dictated: 4:21 PM    Chief Complaint   Chief Complaint   Patient presents with    Wound Check       History of Present Illness   The history is provided by the patient. No  was used.       4:12 PM  79 y.o. female presents to the ED C/O redness with a scab to her RT outer ankle that started 2 weeks ago as a scratch and has since worsened. She has been washing it and applying bacitracin but her daughter is concerned it is getting infected. It is only painful if something grazes the area but it isn't painful otherwise.  No fever, chills, red streaking, warmth, injury, bruising, or deformity.     Nursing Note reviewed    REVIEW OF SYSTEMS    (2-9 systems for level 4, 10 or more for level 5)   Review of Systems   Constitutional:  Negative for chills and fever.   HENT:  Negative for congestion, rhinorrhea and sore throat.    Eyes:  Negative for discharge and redness.   Respiratory:  Negative for cough, shortness of breath and wheezing.    Cardiovascular:  Negative for chest pain and palpitations.   Gastrointestinal:  Negative for abdominal pain, constipation, diarrhea, nausea and vomiting.   Genitourinary:  Negative for dysuria, frequency and urgency.   Musculoskeletal:  Negative for back pain, myalgias and neck pain.   Skin:  Positive for wound (RT ankle). Negative for rash.   Neurological:  Negative for dizziness and headaches.   Hematological:  Negative for adenopathy.   Psychiatric/Behavioral:  Negative for agitation and confusion.        PAST MEDICAL HISTORY     Past Medical History:   Diagnosis Date    Allergic rhinitis     on allergy shots    Anemia     Asthma     Borderline diabetic     Cataract     Chronic lung disease     Cigarette smoker

## 2024-11-21 NOTE — DISCHARGE INSTRUCTIONS
Apply warm compresses. Continue Bacitracin 2-3 times a day. F/U with PCP next week for a wound check. Complete antibiotics.

## 2024-12-12 ENCOUNTER — OFFICE VISIT (OUTPATIENT)
Age: 79
End: 2024-12-12
Payer: MEDICARE

## 2024-12-12 VITALS
OXYGEN SATURATION: 88 % | HEIGHT: 63 IN | HEART RATE: 90 BPM | WEIGHT: 175 LBS | BODY MASS INDEX: 31.01 KG/M2 | TEMPERATURE: 98 F

## 2024-12-12 DIAGNOSIS — M48.062 SPINAL STENOSIS OF LUMBAR REGION WITH NEUROGENIC CLAUDICATION: ICD-10-CM

## 2024-12-12 DIAGNOSIS — M51.360 DEGENERATION OF INTERVERTEBRAL DISC OF LUMBAR REGION WITH DISCOGENIC BACK PAIN: ICD-10-CM

## 2024-12-12 DIAGNOSIS — M43.16 SPONDYLOLISTHESIS, LUMBAR REGION: ICD-10-CM

## 2024-12-12 DIAGNOSIS — M54.16 LUMBAR NEURITIS: Primary | ICD-10-CM

## 2024-12-12 PROCEDURE — 1123F ACP DISCUSS/DSCN MKR DOCD: CPT | Performed by: PHYSICAL MEDICINE & REHABILITATION

## 2024-12-12 PROCEDURE — 1160F RVW MEDS BY RX/DR IN RCRD: CPT | Performed by: PHYSICAL MEDICINE & REHABILITATION

## 2024-12-12 PROCEDURE — 1159F MED LIST DOCD IN RCRD: CPT | Performed by: PHYSICAL MEDICINE & REHABILITATION

## 2024-12-12 PROCEDURE — 99214 OFFICE O/P EST MOD 30 MIN: CPT | Performed by: PHYSICAL MEDICINE & REHABILITATION

## 2024-12-12 PROCEDURE — 1125F AMNT PAIN NOTED PAIN PRSNT: CPT | Performed by: PHYSICAL MEDICINE & REHABILITATION

## 2024-12-12 RX ORDER — PREGABALIN 300 MG/1
300 CAPSULE ORAL 2 TIMES DAILY
Qty: 180 CAPSULE | Refills: 0 | Status: SHIPPED | OUTPATIENT
Start: 2024-12-12 | End: 2025-03-12

## 2024-12-12 NOTE — PROGRESS NOTES
[facility]  [address]  [place] [id number]  Tel: [phone]  Fax: [phone]          PROGRESS NOTE      HISTORY OF PRESENT ILLNESS:  The patient is a 79 y.o. female and was seen today for follow up of Centralized lower back pain. She reports her lower extremity pain has greatly improved. Previously seen for low back pain radiating down the BLE(L=R) in a S1 distribution to the knees. Previously seen for low back pain radiating into the LLE in a S1 distribution to the knee,  occasionally to the ankle. Previously seen for low back pain that intermittently radiates into her BLE in the S1 distribution. Previously seen  for pain and paraesthesias in the BLE (RLE>LLE) to the ankles. Her paraesthesias is worse in the distal aspect of the BLE. Previously seen for right sided low back pain that radiates posteriorly down to her calf with intermittent radiation to her  foot x few months. Previously, she was seen for right sided low back pain that radiates posteriorly down to her calf with intermittent radiation to her foot x a few months. Previously, she was seen  for left-sided lower back pain radiating into the LLE in a S1 distribution to the ankle. Previously, she was seen for progressive, intermittent low back pain into the LLE radiating to the ankle x 8/2019 without trauma. Her pain is exacerbated with walking. Positive shopping cart sign. She has completed MDP without benefit. She is currently on  Cymbalta 90 mg secondary to depression. Patient failed NEURONTIN  800 mg TID secondary to no relief. Pt underwent left L5 and S1 SNRB on 5/28/2020 with good relief. Pt underwent left-sided L5 and S1 SNRB on 1/21/2021 with relief.  She underwent a left L5-S1 SNRB on 6/3/2021 with benefit, her left side was  doing well. Patient underwent B/L L5, B/L S1 SNRB 3/10/2022 with benefit. Patient says we did an L3-L4 epidural dated 10/27/2022 with some relief. Patient  says she

## 2025-01-07 ENCOUNTER — APPOINTMENT (OUTPATIENT)
Facility: HOSPITAL | Age: 80
End: 2025-01-07
Payer: MEDICARE

## 2025-01-07 ENCOUNTER — HOSPITAL ENCOUNTER (EMERGENCY)
Facility: HOSPITAL | Age: 80
Discharge: HOME | End: 2025-01-07
Payer: MEDICARE

## 2025-01-07 VITALS
BODY MASS INDEX: 31.01 KG/M2 | RESPIRATION RATE: 20 BRPM | HEART RATE: 95 BPM | SYSTOLIC BLOOD PRESSURE: 113 MMHG | DIASTOLIC BLOOD PRESSURE: 96 MMHG | TEMPERATURE: 98.2 F | OXYGEN SATURATION: 96 % | WEIGHT: 175 LBS | HEIGHT: 63 IN

## 2025-01-07 DIAGNOSIS — S09.90XA INJURY OF HEAD, INITIAL ENCOUNTER: ICD-10-CM

## 2025-01-07 DIAGNOSIS — S01.81XA FACIAL LACERATION, INITIAL ENCOUNTER: Primary | ICD-10-CM

## 2025-01-07 PROCEDURE — 70450 CT HEAD/BRAIN W/O DYE: CPT

## 2025-01-07 PROCEDURE — 6360000002 HC RX W HCPCS

## 2025-01-07 PROCEDURE — 90471 IMMUNIZATION ADMIN: CPT

## 2025-01-07 PROCEDURE — 72125 CT NECK SPINE W/O DYE: CPT

## 2025-01-07 PROCEDURE — 99284 EMERGENCY DEPT VISIT MOD MDM: CPT

## 2025-01-07 PROCEDURE — 90715 TDAP VACCINE 7 YRS/> IM: CPT

## 2025-01-07 RX ADMIN — TETANUS TOXOID, REDUCED DIPHTHERIA TOXOID AND ACELLULAR PERTUSSIS VACCINE, ADSORBED 0.5 ML: 5; 2.5; 8; 8; 2.5 SUSPENSION INTRAMUSCULAR at 12:58

## 2025-01-07 ASSESSMENT — ENCOUNTER SYMPTOMS
COUGH: 0
BACK PAIN: 0
SHORTNESS OF BREATH: 0
CHEST TIGHTNESS: 0
GASTROINTESTINAL NEGATIVE: 1

## 2025-01-07 ASSESSMENT — LIFESTYLE VARIABLES
HOW MANY STANDARD DRINKS CONTAINING ALCOHOL DO YOU HAVE ON A TYPICAL DAY: PATIENT DOES NOT DRINK
HOW OFTEN DO YOU HAVE A DRINK CONTAINING ALCOHOL: NEVER

## 2025-01-07 ASSESSMENT — PAIN - FUNCTIONAL ASSESSMENT: PAIN_FUNCTIONAL_ASSESSMENT: 0-10

## 2025-01-07 ASSESSMENT — PAIN SCALES - GENERAL: PAINLEVEL_OUTOF10: 3

## 2025-01-07 NOTE — ED TRIAGE NOTES
Pt states she fell out of bed this morning and hit the right side of her forehead on her nightstand. Needs a tetanus shot. Has a small lac with bleeding controlled to her right forehead area. Pt states blood \"spouted\" out when it happened

## 2025-01-07 NOTE — ED PROVIDER NOTES
[facility] EMERGENCY DEPARTMENT  EMERGENCY DEPARTMENT ENCOUNTER      Pt Name: Karyn Thomas  MRN: [id number]  Birthdate 1945  Date of evaluation: 1/7/2025  Provider: VANE Ambrocio  7:04 PM    CHIEF COMPLAINT       Chief Complaint   Patient presents with    Laceration         HISTORY OF PRESENT ILLNESS    Karyn Thomas is a 79 y.o. female who presents to the emergency department with abrasion to face after accidental fall from bed around 0800.      80 y/o f presents to ED with laceration to right temple. She reports that she fell out of bed this morning and hit the right side of her forehead on her nightstand. She denies LOC. She reports small wound with bleeding controlled to her right forehead area. Pt states blood \"spouted\" out when it happened. She denies blood thinner use. She reports slight headache and neck pain. She is unsure if neck pain is different than chronic neck pain. Denies pain to extremities, abdomen, flank.         Nursing Notes were reviewed.    REVIEW OF SYSTEMS       Review of Systems   Constitutional:  Negative for chills, fatigue and fever.   HENT:  Negative for congestion and ear pain.    Eyes:  Negative for visual disturbance.   Respiratory:  Negative for cough, chest tightness and shortness of breath.    Cardiovascular:  Negative for chest pain and palpitations.   Gastrointestinal: Negative.    Genitourinary:  Negative for difficulty urinating, dysuria and flank pain.   Musculoskeletal:  Positive for neck pain. Negative for back pain, myalgias and neck stiffness.   Skin:  Negative for pallor and wound.        Laceration to right temple; bleeding controlled.    Neurological:  Positive for headaches. Negative for dizziness, tremors, seizures, syncope, speech difficulty, weakness, light-headedness and numbness.   Hematological: Negative.    Psychiatric/Behavioral: Negative.         Except as noted above the remainder of the review of systems was reviewed and

## 2025-01-07 NOTE — DISCHARGE INSTRUCTIONS
Call PCP for f/u in Office in 1 week as needed.   Return to ED for new or worsening/ concerning symptoms.

## 2025-01-28 ENCOUNTER — APPOINTMENT (OUTPATIENT)
Facility: HOSPITAL | Age: 80
End: 2025-01-28
Payer: MEDICARE

## 2025-01-28 ENCOUNTER — HOSPITAL ENCOUNTER (EMERGENCY)
Facility: HOSPITAL | Age: 80
Discharge: HOME | End: 2025-01-28
Attending: STUDENT IN AN ORGANIZED HEALTH CARE EDUCATION/TRAINING PROGRAM
Payer: MEDICARE

## 2025-01-28 VITALS
TEMPERATURE: 98.6 F | HEIGHT: 64 IN | WEIGHT: 170 LBS | OXYGEN SATURATION: 99 % | SYSTOLIC BLOOD PRESSURE: 130 MMHG | DIASTOLIC BLOOD PRESSURE: 80 MMHG | RESPIRATION RATE: 18 BRPM | HEART RATE: 90 BPM | BODY MASS INDEX: 29.02 KG/M2

## 2025-01-28 DIAGNOSIS — S02.40FA CLOSED FRACTURE OF LEFT ZYGOMATIC ARCH, INITIAL ENCOUNTER: ICD-10-CM

## 2025-01-28 DIAGNOSIS — S02.32XA CLOSED FRACTURE OF LEFT ORBITAL FLOOR, INITIAL ENCOUNTER: Primary | ICD-10-CM

## 2025-01-28 PROCEDURE — 70486 CT MAXILLOFACIAL W/O DYE: CPT

## 2025-01-28 PROCEDURE — 6370000000 HC RX 637 (ALT 250 FOR IP): Performed by: STUDENT IN AN ORGANIZED HEALTH CARE EDUCATION/TRAINING PROGRAM

## 2025-01-28 PROCEDURE — 99284 EMERGENCY DEPT VISIT MOD MDM: CPT

## 2025-01-28 RX ADMIN — AMOXICILLIN AND CLAVULANATE POTASSIUM 1 TABLET: 875; 125 TABLET, FILM COATED ORAL at 20:25

## 2025-01-28 ASSESSMENT — PAIN SCALES - GENERAL
PAINLEVEL_OUTOF10: 0

## 2025-01-28 ASSESSMENT — VISUAL ACUITY
OD: 20/70
OS: 20/70
OU: 20/40

## 2025-01-28 ASSESSMENT — PAIN - FUNCTIONAL ASSESSMENT: PAIN_FUNCTIONAL_ASSESSMENT: 0-10

## 2025-01-28 NOTE — ED PROVIDER NOTES
[facility] EMERGENCY DEPARTMENT  EMERGENCY DEPARTMENT ENCOUNTER      Pt Name: Karyn Thomas  MRN: [id number]  Birthdate 1945  Date of evaluation: 1/28/2025  Provider: Lula Elizabeth MD    CHIEF COMPLAINT       Chief Complaint   Patient presents with    Fall         HISTORY OF PRESENT ILLNESS   (Location/Symptom, Timing/Onset, Context/Setting, Quality, Duration, Modifying Factors, Severity)  Note limiting factors.   Karyn Thomas is a 79 y.o. female who presents to the emergency department after a fall from bed.  This occurred at around 6 AM this morning.  States he was sleeping she feels like she got caught up in her covers and fell off.  She believes she hit her face on the side of her nightstand.  She states she is done this before.  Since then she is noticed that she is been having double vision.  She denies any symptoms prior to her fall.  States that she remembers everything that happened, denies any loss of consciousness.  Denies any headache, denies any nausea or vomiting.  Does not take any blood thinners.  She does feel like she is having double vision in her left eye with some pain and swelling around it.  Denies any actual loss of vision.  Does have some mild photophobia.  Denies any other injury or trauma.     Nursing Notes were reviewed.    REVIEW OF SYSTEMS    (2-9 systems for level 4, 10 or more for level 5)     Constitutional: No fever  HENT: No ear pain  Eyes: No change in vision  Respiratory: No SOB  Cardio: No chest pain  GI: No blood in stool  : No hematuria  MSK: No back pain  Skin: No rashes  Neuro: No headache    Except as noted above the remainder of the review of systems was reviewed and negative.       PAST MEDICAL HISTORY     Past Medical History:   Diagnosis Date    Allergic rhinitis     on allergy shots    Anemia     Asthma     Borderline diabetic     Cataract     Chronic lung disease     Cigarette smoker     abuse quit 2006    COPD (chronic obstructive

## 2025-01-28 NOTE — ED TRIAGE NOTES
Patient presents to ER with c/o falling out of bed at 0600 this morning hitting her face on the corner of her nightstand.

## 2025-01-28 NOTE — ED NOTES
Patient returned from CT to [facility]. Denies pain, playing on cellphone.  Patient provided with a portable oxygen tank. O2 sats checked 100% on 4L

## 2025-01-28 NOTE — ED NOTES
Visual Acuity     Bilateral Distance -- 20/40   R Distance -- 20/70   L Distance -- 20/70   Unable to Obtain Visual Acuity (Comment) -- --   Visual Aids

## 2025-01-29 NOTE — ED NOTES
1/29/2025 0935am      Call received from Opthalmology office stating they did not have any contact information for patient. Call placed to patient's daughter whose number is on chart left message to contact [phone] to complete appointment details.

## 2025-01-29 NOTE — DISCHARGE INSTRUCTIONS
You have a orbital fracture with entrapment.  This is very high risk for vision loss.  Would recommend returning to the emergency department (specifically [facility]) for proper management however at the very least please see your ophthalmology group tomorrow.

## 2025-02-04 ENCOUNTER — TELEPHONE (OUTPATIENT)
Age: 80
End: 2025-02-04

## 2025-02-04 NOTE — TELEPHONE ENCOUNTER
The pt was identified using 2 pt identifiers. Pt let me know she has not had her MRI yet and will need the 2/11/2025 appt with Dr. Frank R/Sed to after 3/4/2025. Pt is aware they will be getting a call to R/S. Thank you!

## 2025-02-18 ENCOUNTER — HOSPITAL ENCOUNTER (OUTPATIENT)
Facility: HOSPITAL | Age: 80
Discharge: HOME | End: 2025-02-21
Attending: PHYSICAL MEDICINE & REHABILITATION
Payer: MEDICARE

## 2025-02-18 DIAGNOSIS — M43.16 SPONDYLOLISTHESIS, LUMBAR REGION: ICD-10-CM

## 2025-02-18 DIAGNOSIS — M51.360 DEGENERATION OF INTERVERTEBRAL DISC OF LUMBAR REGION WITH DISCOGENIC BACK PAIN: ICD-10-CM

## 2025-02-18 DIAGNOSIS — M48.062 SPINAL STENOSIS OF LUMBAR REGION WITH NEUROGENIC CLAUDICATION: ICD-10-CM

## 2025-02-18 DIAGNOSIS — M54.16 LUMBAR NEURITIS: ICD-10-CM

## 2025-02-18 PROCEDURE — 72148 MRI LUMBAR SPINE W/O DYE: CPT

## 2025-03-04 ENCOUNTER — OFFICE VISIT (OUTPATIENT)
Age: 80
End: 2025-03-04
Payer: MEDICARE

## 2025-03-04 VITALS — BODY MASS INDEX: 29.37 KG/M2 | HEIGHT: 64 IN | TEMPERATURE: 98 F | WEIGHT: 172 LBS

## 2025-03-04 DIAGNOSIS — M54.16 LUMBAR NEURITIS: Primary | ICD-10-CM

## 2025-03-04 DIAGNOSIS — M43.16 SPONDYLOLISTHESIS, LUMBAR REGION: ICD-10-CM

## 2025-03-04 DIAGNOSIS — M51.360 DEGENERATION OF INTERVERTEBRAL DISC OF LUMBAR REGION WITH DISCOGENIC BACK PAIN: ICD-10-CM

## 2025-03-04 DIAGNOSIS — M48.062 SPINAL STENOSIS OF LUMBAR REGION WITH NEUROGENIC CLAUDICATION: ICD-10-CM

## 2025-03-04 PROCEDURE — 1125F AMNT PAIN NOTED PAIN PRSNT: CPT | Performed by: PHYSICAL MEDICINE & REHABILITATION

## 2025-03-04 PROCEDURE — 1160F RVW MEDS BY RX/DR IN RCRD: CPT | Performed by: PHYSICAL MEDICINE & REHABILITATION

## 2025-03-04 PROCEDURE — 99214 OFFICE O/P EST MOD 30 MIN: CPT | Performed by: PHYSICAL MEDICINE & REHABILITATION

## 2025-03-04 PROCEDURE — 1159F MED LIST DOCD IN RCRD: CPT | Performed by: PHYSICAL MEDICINE & REHABILITATION

## 2025-03-04 PROCEDURE — 1123F ACP DISCUSS/DSCN MKR DOCD: CPT | Performed by: PHYSICAL MEDICINE & REHABILITATION

## 2025-03-04 RX ORDER — PREGABALIN 300 MG/1
300 CAPSULE ORAL 2 TIMES DAILY
Qty: 180 CAPSULE | Refills: 0 | Status: SHIPPED | OUTPATIENT
Start: 2025-03-04 | End: 2025-06-02

## 2025-03-04 RX ORDER — LISINOPRIL 10 MG/1
10 TABLET ORAL DAILY
COMMUNITY

## 2025-03-04 NOTE — PROGRESS NOTES
capsule 6 capsules.      gabapentin (NEURONTIN) 400 MG capsule       naproxen (NAPROSYN) 500 MG tablet       pregabalin (LYRICA) 300 MG capsule Take 1 capsule by mouth 2 times daily for 90 days. Max Daily Amount: 600 mg 180 capsule 0    pregabalin (LYRICA) 300 MG capsule Take 1 capsule by mouth 2 times daily for 10 days. Max Daily Amount: 600 mg 20 capsule 0    fluticasone-umeclidin-vilant (TRELEGY ELLIPTA) 200-62.5-25 MCG/ACT AEPB inhaler Inhale 1 puff into the lungs daily (Patient not taking: Reported on 3/4/2025)      pregabalin (LYRICA) 300 MG capsule Take 1 capsule by mouth 2 times daily for 90 days. Max Daily Amount: 600 mg 180 capsule 0    budesonide (PULMICORT) 1 MG/2ML nebulizer suspension  (Patient not taking: Reported on 5/30/2024)      pregabalin (LYRICA) 300 MG capsule Take 1 capsule by mouth 2 times daily for 90 days. Max Daily Amount: 600 mg 180 capsule 0    pregabalin (LYRICA) 300 MG capsule Take 1 capsule by mouth 2 times daily for 30 days. Max Daily Amount: 600 mg 60 capsule 0    pregabalin (LYRICA) 300 MG capsule Take 1 capsule by mouth 2 times daily for 90 days. Max Daily Amount: 600 mg 180 capsule 0    pregabalin (LYRICA) 300 MG capsule Take 1 capsule by mouth 2 times daily for 30 days. Max Daily Amount: 600 mg 60 capsule 0    albuterol (PROVENTIL) (5 MG/ML) 0.5% nebulizer solution Inhale 0.5 mLs into the lungs every 4 hours as needed (Patient not taking: Reported on 3/4/2025)      arformoterol tartrate (BROVANA) 15 MCG/2ML NEBU INHALE TWO MILLILITERS (ONE VIAL) BY MOUTH TWICE A DAY (Patient not taking: Reported on 5/30/2024)      tiotropium (SPIRIVA RESPIMAT) 2.5 MCG/ACT AERS inhaler Inhale 2 puffs into the lungs daily (Patient not taking: Reported on 5/30/2024)      umeclidinium bromide (INCRUSE ELLIPTA) 62.5 MCG/ACT inhaler Inhale 1 puff into the lungs daily (Patient not taking: Reported on 5/30/2024)       No current facility-administered medications for this visit.       Allergies

## 2025-06-02 NOTE — PROGRESS NOTES
Karyn Thomas  1945   Chief Complaint   Patient presents with    Shoulder Pain     bialteral        HISTORY OF PRESENT ILLNESS  Karyn Thomas is a 79 y.o. female who presents today for reevaluation of bilateral shoulder pain. Pain is a 1/10.    Patient received a right shoulder cortisone injection last OV on 03/21/2024 and 9/26/2024 in which the shoulder responded to up until now.     Patient denies any fever, chills, chest pain, shortness of breath or calf pain. The remainder of the review of systems is negative. There are no new illnesses or injuries other than that mentioned above to report since last seen in the office. No changes in medications, allergies, social or family history.      PHYSICAL EXAM:   There were no vitals taken for this visit.  The patient is a well-developed, well-nourished female   in no acute distress.  The patient is alert and oriented times three. Mood and affect are normal.    LYMPHATIC: lymph nodes are not enlarged and are within normal limits    SKIN: normal in color and non tender to palpation. There are no bruises or abrasions noted.     NEUROLOGICAL: Motor sensory exam is within normal limits. Reflexes are equal bilaterally. There is normal sensation to pinprick and light touch    MUSCULOSKELETAL: Restricted range of motion with pain unchanged from prior  PROCEDURE: bilateral shoulder Injection with Ultrasound Guidance     Indication: bilateral shoulder pain/swelling     After sterile prep, 6mL lidocaine with 1mL 40mg Kenalog were injected into the bilateral shoulder intraarticular under ultrasound guidance. Ultrasound images captured and scanned into patient's chart.          [facility] - [facility]  OFFICE PROCEDURE PROGRESS NOTE           Chart reviewed for the following:  IFederico MD, have reviewed the History, Physical and updated the Allergic reactions for Karyn Thomas     TIME OUT performed immediately prior to start of

## 2025-06-03 ENCOUNTER — OFFICE VISIT (OUTPATIENT)
Age: 80
End: 2025-06-03
Payer: MEDICARE

## 2025-06-03 DIAGNOSIS — M19.012 PRIMARY OSTEOARTHRITIS OF BOTH SHOULDERS: Primary | ICD-10-CM

## 2025-06-03 DIAGNOSIS — M19.011 PRIMARY OSTEOARTHRITIS OF BOTH SHOULDERS: Primary | ICD-10-CM

## 2025-06-03 PROCEDURE — 1123F ACP DISCUSS/DSCN MKR DOCD: CPT | Performed by: ORTHOPAEDIC SURGERY

## 2025-06-03 PROCEDURE — 99214 OFFICE O/P EST MOD 30 MIN: CPT | Performed by: ORTHOPAEDIC SURGERY

## 2025-06-03 PROCEDURE — 20611 DRAIN/INJ JOINT/BURSA W/US: CPT | Performed by: ORTHOPAEDIC SURGERY

## 2025-06-03 PROCEDURE — 1125F AMNT PAIN NOTED PAIN PRSNT: CPT | Performed by: ORTHOPAEDIC SURGERY

## 2025-06-03 PROCEDURE — 1159F MED LIST DOCD IN RCRD: CPT | Performed by: ORTHOPAEDIC SURGERY

## 2025-06-03 PROCEDURE — 1160F RVW MEDS BY RX/DR IN RCRD: CPT | Performed by: ORTHOPAEDIC SURGERY

## 2025-06-03 RX ORDER — TRIAMCINOLONE ACETONIDE 40 MG/ML
80 INJECTION, SUSPENSION INTRA-ARTICULAR; INTRAMUSCULAR ONCE
Status: COMPLETED | OUTPATIENT
Start: 2025-06-03 | End: 2025-06-03

## 2025-06-03 RX ADMIN — TRIAMCINOLONE ACETONIDE 80 MG: 40 INJECTION, SUSPENSION INTRA-ARTICULAR; INTRAMUSCULAR at 10:00

## 2025-06-17 ENCOUNTER — OFFICE VISIT (OUTPATIENT)
Age: 80
End: 2025-06-17
Payer: MEDICARE

## 2025-06-17 VITALS
TEMPERATURE: 98 F | HEART RATE: 61 BPM | HEIGHT: 64 IN | BODY MASS INDEX: 28.17 KG/M2 | OXYGEN SATURATION: 93 % | WEIGHT: 165 LBS

## 2025-06-17 DIAGNOSIS — M48.062 SPINAL STENOSIS OF LUMBAR REGION WITH NEUROGENIC CLAUDICATION: ICD-10-CM

## 2025-06-17 DIAGNOSIS — M54.16 LUMBAR NEURITIS: Primary | ICD-10-CM

## 2025-06-17 DIAGNOSIS — M43.16 SPONDYLOLISTHESIS, LUMBAR REGION: ICD-10-CM

## 2025-06-17 DIAGNOSIS — F41.9 ANXIETY: ICD-10-CM

## 2025-06-17 DIAGNOSIS — M51.360 DEGENERATION OF INTERVERTEBRAL DISC OF LUMBAR REGION WITH DISCOGENIC BACK PAIN: ICD-10-CM

## 2025-06-17 PROCEDURE — 1123F ACP DISCUSS/DSCN MKR DOCD: CPT | Performed by: PHYSICAL MEDICINE & REHABILITATION

## 2025-06-17 PROCEDURE — 1159F MED LIST DOCD IN RCRD: CPT | Performed by: PHYSICAL MEDICINE & REHABILITATION

## 2025-06-17 PROCEDURE — 1125F AMNT PAIN NOTED PAIN PRSNT: CPT | Performed by: PHYSICAL MEDICINE & REHABILITATION

## 2025-06-17 PROCEDURE — 1160F RVW MEDS BY RX/DR IN RCRD: CPT | Performed by: PHYSICAL MEDICINE & REHABILITATION

## 2025-06-17 PROCEDURE — 99214 OFFICE O/P EST MOD 30 MIN: CPT | Performed by: PHYSICAL MEDICINE & REHABILITATION

## 2025-06-17 RX ORDER — DIAZEPAM 10 MG/1
10 TABLET ORAL ONCE
Qty: 1 TABLET | Refills: 0 | Status: SHIPPED | OUTPATIENT
Start: 2025-06-17 | End: 2025-06-17

## 2025-06-17 RX ORDER — PREGABALIN 300 MG/1
300 CAPSULE ORAL 2 TIMES DAILY
Qty: 180 CAPSULE | Refills: 0 | Status: SHIPPED | OUTPATIENT
Start: 2025-06-17 | End: 2025-09-15

## 2025-06-17 RX ORDER — EZETIMIBE 10 MG/1
TABLET ORAL
COMMUNITY
Start: 2025-03-26

## 2025-06-17 NOTE — PROGRESS NOTES
[facility]  [address]  [place] [id number]  Tel: [phone]  Fax: [phone]          PROGRESS NOTE      HISTORY OF PRESENT ILLNESS:  The patient is a 79 y.o. female and was seen today for follow up of centralized lower back pain. She reports her lower extremity pain has greatly improved. Previously seen for low back pain radiating down the BLE(L=R) in a S1 distribution to the knees. Previously seen for low back pain radiating into the LLE in a S1 distribution to the knee,  occasionally to the ankle. Previously seen for low back pain that intermittently radiates into her BLE in the S1 distribution. Previously seen  for pain and paraesthesias in the BLE (RLE>LLE) to the ankles. Her paraesthesias is worse in the distal aspect of the BLE. Previously seen for right sided low back pain that radiates posteriorly down to her calf with intermittent radiation to her  foot x few months. Previously, she was seen for right sided low back pain that radiates posteriorly down to her calf with intermittent radiation to her foot x a few months. Previously, she was seen  for left-sided lower back pain radiating into the LLE in a S1 distribution to the ankle. Previously, she was seen for progressive, intermittent low back pain into the LLE radiating to the ankle x 8/2019 without trauma. Her pain is exacerbated with walking. Positive shopping cart sign. She has completed MDP without benefit. She is currently on  Cymbalta 90 mg secondary to depression. Patient failed NEURONTIN  800 mg TID secondary to no relief. Pt underwent left L5 and S1 SNRB on 5/28/2020 with good relief. Pt underwent left-sided L5 and S1 SNRB on 1/21/2021 with relief.  She underwent a left L5-S1 SNRB on 6/3/2021 with benefit, her left side was  doing well. Patient underwent B/L L5, B/L S1 SNRB 3/10/2022 with benefit. Patient says we did an L3-L4 epidural dated 10/27/2022 with some relief. Patient  says she

## 2025-09-05 ENCOUNTER — OFFICE VISIT (OUTPATIENT)
Age: 80
End: 2025-09-05
Payer: MEDICARE

## 2025-09-05 VITALS
HEART RATE: 60 BPM | TEMPERATURE: 98 F | HEIGHT: 64 IN | WEIGHT: 177 LBS | OXYGEN SATURATION: 92 % | BODY MASS INDEX: 30.22 KG/M2

## 2025-09-05 DIAGNOSIS — M51.360 DEGENERATION OF INTERVERTEBRAL DISC OF LUMBAR REGION WITH DISCOGENIC BACK PAIN: ICD-10-CM

## 2025-09-05 DIAGNOSIS — M54.16 LUMBAR NEURITIS: Primary | ICD-10-CM

## 2025-09-05 DIAGNOSIS — M43.16 SPONDYLOLISTHESIS, LUMBAR REGION: ICD-10-CM

## 2025-09-05 DIAGNOSIS — M48.062 SPINAL STENOSIS OF LUMBAR REGION WITH NEUROGENIC CLAUDICATION: ICD-10-CM

## 2025-09-05 PROCEDURE — 1159F MED LIST DOCD IN RCRD: CPT | Performed by: PHYSICAL MEDICINE & REHABILITATION

## 2025-09-05 PROCEDURE — 1160F RVW MEDS BY RX/DR IN RCRD: CPT | Performed by: PHYSICAL MEDICINE & REHABILITATION

## 2025-09-05 PROCEDURE — 1123F ACP DISCUSS/DSCN MKR DOCD: CPT | Performed by: PHYSICAL MEDICINE & REHABILITATION

## 2025-09-05 PROCEDURE — 1125F AMNT PAIN NOTED PAIN PRSNT: CPT | Performed by: PHYSICAL MEDICINE & REHABILITATION

## 2025-09-05 PROCEDURE — 99214 OFFICE O/P EST MOD 30 MIN: CPT | Performed by: PHYSICAL MEDICINE & REHABILITATION

## 2025-09-05 RX ORDER — PREGABALIN 300 MG/1
300 CAPSULE ORAL 2 TIMES DAILY
Qty: 180 CAPSULE | Refills: 1 | Status: SHIPPED | OUTPATIENT
Start: 2025-09-05 | End: 2026-03-04

## (undated) DEVICE — (D)NDL SPNE 22GX15CM -- DISC BY MFR W/NO SUB

## (undated) DEVICE — TRAY MYEL SFTY +

## (undated) DEVICE — NDL SPNE MANAN 22GX6IN --

## (undated) DEVICE — Device: Brand: MEDEX

## (undated) DEVICE — MEDIA CONTRAST 10ML 200MG/ML 41%

## (undated) DEVICE — SET EPI 18GA L3.5IN TUOHY NDL W/ 20GA CLS TIP NYL CATH

## (undated) DEVICE — SYR 10ML LUER LOK 1/5ML GRAD --

## (undated) DEVICE — BANDAGE ADH W0.75XL3IN UNIV WVN FAB NAT GEN USE STRP N ADH

## (undated) DEVICE — (D)BNDG ADHESIVE FABRIC 3/4X3 -- DISC BY MFR USE ITEM 357960

## (undated) DEVICE — MARKER,SKIN,WI/RULER AND LABELS: Brand: MEDLINE